# Patient Record
Sex: FEMALE | Race: WHITE | NOT HISPANIC OR LATINO | Employment: STUDENT | URBAN - METROPOLITAN AREA
[De-identification: names, ages, dates, MRNs, and addresses within clinical notes are randomized per-mention and may not be internally consistent; named-entity substitution may affect disease eponyms.]

---
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
## 2020-01-07 DIAGNOSIS — Z30.011 ENCOUNTER FOR INITIAL PRESCRIPTION OF CONTRACEPTIVE PILLS: ICD-10-CM

## 2020-01-07 RX ORDER — ACETAMINOPHEN AND CODEINE PHOSPHATE 120; 12 MG/5ML; MG/5ML
1 SOLUTION ORAL DAILY
Qty: 84 TABLET | Refills: 1 | Status: SHIPPED | OUTPATIENT
Start: 2020-01-07 | End: 2020-03-10 | Stop reason: SDUPTHER

## 2020-01-07 NOTE — TELEPHONE ENCOUNTER
Patient cancelled her yearly for tomorrow due to menses  Patient is a student at [employer] and will come home again on 3/10/20 for her yearly exam   Requests BCP Rx to last until then  She initially thought she wouldn't be back until May but she arranged to be back in March  Sienna sent

## 2020-02-08 ENCOUNTER — OFFICE VISIT (OUTPATIENT)
Dept: URGENT CARE | Facility: CLINIC | Age: 20
End: 2020-02-08
Payer: COMMERCIAL

## 2020-02-08 VITALS
HEART RATE: 93 BPM | DIASTOLIC BLOOD PRESSURE: 64 MMHG | RESPIRATION RATE: 18 BRPM | SYSTOLIC BLOOD PRESSURE: 120 MMHG | OXYGEN SATURATION: 99 % | TEMPERATURE: 98.1 F

## 2020-02-08 DIAGNOSIS — J02.9 SORE THROAT: Primary | ICD-10-CM

## 2020-02-08 LAB — S PYO AG THROAT QL: NEGATIVE

## 2020-02-08 PROCEDURE — 87880 STREP A ASSAY W/OPTIC: CPT | Performed by: PREVENTIVE MEDICINE

## 2020-02-08 PROCEDURE — 99213 OFFICE O/P EST LOW 20 MIN: CPT | Performed by: PREVENTIVE MEDICINE

## 2020-02-08 NOTE — PROGRESS NOTES
[facility]        NAME: Jones Merchant is a 23 y o  female  : 2000    MRN: [id number]  DATE: 2020  TIME: 12:21 PM    Assessment and Plan   Sore throat [J02 9]  1  Sore throat  POCT rapid strepA    Throat culture         Patient Instructions       Follow up with PCP in 3-5 days  Proceed to  ER if symptoms worsen  Chief Complaint     Chief Complaint   Patient presents with    Sinus Problem     2 weeks, congestion    Sore Throat     feels like cotton in throat         History of Present Illness       Sore throat and head congestion x2 weeks      Review of Systems   Review of Systems   Constitutional: Negative for fever  HENT: Positive for congestion and sore throat  Current Medications       Current Outpatient Medications:     albuterol (PROVENTIL HFA,VENTOLIN HFA) 90 mcg/act inhaler, Inhale 2 puffs, Disp: , Rfl:     desvenlafaxine succinate (PRISTIQ) 50 mg 24 hr tablet, Take 50 mg by mouth daily, Disp: , Rfl:     fluticasone (FLONASE) 50 mcg/act nasal spray, 2 sprays into each nostril daily, Disp: 16 g, Rfl: 0    ibuprofen (MOTRIN) 800 mg tablet, TAKE 1 TABLET (800 MG TOTAL) BY MOUTH 2 (TWO) TIMES A DAY AS NEEDED FOR MILD PAIN (PAIN SCORE 1-3)  , Disp: , Rfl: 5    LORazepam (ATIVAN) 0 5 mg tablet, Take 0 5 mg by mouth daily as needed, Disp: , Rfl: 0    norethindrone (MICRONOR) 0 35 MG tablet, Take 1 tablet (0 35 mg total) by mouth daily, Disp: 84 tablet, Rfl: 1    ondansetron (ZOFRAN) 8 mg tablet, TAKE 1 TABLET (8 MG TOTAL) BY MOUTH 2 (TWO) TIMES A DAY AS NEEDED FOR NAUSEA OR VOMITING , Disp: , Rfl: 5    orphenadrine (NORFLEX) 100 mg tablet, TAKE 1 TABLET BY MOUTH TWICE A DAY, Disp: 60 tablet, Rfl: 0    pantoprazole (PROTONIX) 40 mg tablet, Take 1 tablet (40 mg total) by mouth daily, Disp: 90 tablet, Rfl: 3    QUEtiapine (SEROquel) 25 mg tablet, Take 12 5 mg by mouth daily, Disp: , Rfl:     sucralfate (CARAFATE) 1 g tablet, Take 1 tablet (1 g total) by mouth 2 (two) times a day, Disp: 180 tablet, Rfl: 3    pantoprazole (PROTONIX) 40 mg tablet, TAKE 1 TABLET BY MOUTH EVERY DAY, Disp: 90 tablet, Rfl: 1    Current Allergies     Allergies as of 02/08/2020 - Reviewed 02/08/2020   Allergen Reaction Noted    Strawberry c [ascorbate] Anaphylaxis 01/22/2019            The following portions of the patient's history were reviewed and updated as appropriate: allergies, current medications, past family history, past medical history, past social history, past surgical history and problem list      Past Medical History:   Diagnosis Date    Anemia     Anxiety     Asthma     Depression     Endometriosis     Frequent sinus infections     GERD (gastroesophageal reflux disease)     Miscarriage     Ovarian cyst     Syncope        Past Surgical History:   Procedure Laterality Date    TONSILLECTOMY  2006    UPPER GASTROINTESTINAL ENDOSCOPY  05/18/2017    Dysphagia, dilated to [address]   UPPER GASTROINTESTINAL ENDOSCOPY  10/07/2016    Dysphagia, dilated to [address]  Biopsies negative for eosinophilic esophagitis    UPPER GASTROINTESTINAL ENDOSCOPY  02/22/2018    Dysphagia, dilated to [address]    Biopsies negative for eosinophilic esophagitis    WISDOM TOOTH EXTRACTION         Family History   Problem Relation Age of Onset    Hypertension Mother    Seth Hoops Arthritis Mother     Thyroid disease Mother     Colon polyps Mother     Hyperlipidemia Father     Hypertension Maternal Grandfather     Stroke Maternal Grandfather     Myasthenia gravis Maternal Grandfather     Diabetes Paternal Grandfather     Stroke Paternal Grandfather     Cancer Paternal Grandfather     Bipolar disorder Sister     Suicide Attempts Sister     Mental illness Other     Colon cancer Other     Thyroid disease Maternal Grandmother     Colon polyps Maternal Grandmother     Bipolar disorder Paternal Aunt     Drug abuse Paternal Aunt     Eating disorder Paternal Aunt     Substance Abuse Neg Hx          Medications have been verified  Objective   /64   Pulse 93   Temp 98 1 °F (36 7 °C) (Tympanic)   Resp 18   SpO2 99%        Physical Exam     Physical Exam   HENT:   Right Ear: External ear normal    Left Ear: External ear normal    Mild posterior pharyngeal reddening   Cardiovascular: Normal heart sounds  Pulmonary/Chest: Breath sounds normal    Lymphadenopathy:     She has no cervical adenopathy       Rapid strep screen negative at 5 minutes

## 2020-02-10 LAB — B-HEM STREP SPEC QL CULT: NEGATIVE

## 2020-03-10 ENCOUNTER — APPOINTMENT (OUTPATIENT)
Dept: RADIOLOGY | Facility: CLINIC | Age: 20
End: 2020-03-10
Payer: COMMERCIAL

## 2020-03-10 ENCOUNTER — OFFICE VISIT (OUTPATIENT)
Dept: FAMILY MEDICINE CLINIC | Facility: CLINIC | Age: 20
End: 2020-03-10
Payer: COMMERCIAL

## 2020-03-10 ENCOUNTER — ANNUAL EXAM (OUTPATIENT)
Dept: OBGYN CLINIC | Facility: CLINIC | Age: 20
End: 2020-03-10
Payer: COMMERCIAL

## 2020-03-10 VITALS
DIASTOLIC BLOOD PRESSURE: 76 MMHG | SYSTOLIC BLOOD PRESSURE: 120 MMHG | WEIGHT: 190 LBS | BODY MASS INDEX: 30.53 KG/M2 | HEIGHT: 66 IN

## 2020-03-10 VITALS
DIASTOLIC BLOOD PRESSURE: 76 MMHG | WEIGHT: 206 LBS | HEART RATE: 80 BPM | HEIGHT: 66 IN | RESPIRATION RATE: 15 BRPM | BODY MASS INDEX: 33.11 KG/M2 | SYSTOLIC BLOOD PRESSURE: 118 MMHG

## 2020-03-10 DIAGNOSIS — J45.909 ASTHMA, UNSPECIFIED ASTHMA SEVERITY, UNSPECIFIED WHETHER COMPLICATED, UNSPECIFIED WHETHER PERSISTENT: Primary | ICD-10-CM

## 2020-03-10 DIAGNOSIS — A64 STD (FEMALE): ICD-10-CM

## 2020-03-10 DIAGNOSIS — R05.3 CHRONIC COUGH: ICD-10-CM

## 2020-03-10 DIAGNOSIS — N92.6 IRREGULAR PERIODS: Primary | ICD-10-CM

## 2020-03-10 DIAGNOSIS — Z11.3 SCREENING FOR STD (SEXUALLY TRANSMITTED DISEASE): ICD-10-CM

## 2020-03-10 DIAGNOSIS — Z30.011 ENCOUNTER FOR INITIAL PRESCRIPTION OF CONTRACEPTIVE PILLS: ICD-10-CM

## 2020-03-10 DIAGNOSIS — Z01.419 ENCOUNTER FOR ANNUAL ROUTINE GYNECOLOGICAL EXAMINATION: ICD-10-CM

## 2020-03-10 LAB — SL AMB POCT URINE HCG: NORMAL

## 2020-03-10 PROCEDURE — 87491 CHLMYD TRACH DNA AMP PROBE: CPT | Performed by: OBSTETRICS & GYNECOLOGY

## 2020-03-10 PROCEDURE — 3008F BODY MASS INDEX DOCD: CPT | Performed by: OBSTETRICS & GYNECOLOGY

## 2020-03-10 PROCEDURE — 3008F BODY MASS INDEX DOCD: CPT | Performed by: NURSE PRACTITIONER

## 2020-03-10 PROCEDURE — 1036F TOBACCO NON-USER: CPT | Performed by: NURSE PRACTITIONER

## 2020-03-10 PROCEDURE — 71046 X-RAY EXAM CHEST 2 VIEWS: CPT

## 2020-03-10 PROCEDURE — 87591 N.GONORRHOEAE DNA AMP PROB: CPT | Performed by: OBSTETRICS & GYNECOLOGY

## 2020-03-10 PROCEDURE — 81025 URINE PREGNANCY TEST: CPT | Performed by: OBSTETRICS & GYNECOLOGY

## 2020-03-10 PROCEDURE — 99213 OFFICE O/P EST LOW 20 MIN: CPT | Performed by: NURSE PRACTITIONER

## 2020-03-10 PROCEDURE — 99395 PREV VISIT EST AGE 18-39: CPT | Performed by: OBSTETRICS & GYNECOLOGY

## 2020-03-10 PROCEDURE — 70220 X-RAY EXAM OF SINUSES: CPT

## 2020-03-10 RX ORDER — MONTELUKAST SODIUM 10 MG/1
10 TABLET ORAL
Qty: 90 TABLET | Refills: 3 | Status: SHIPPED | OUTPATIENT
Start: 2020-03-10 | End: 2020-07-14 | Stop reason: ALTCHOICE

## 2020-03-10 RX ORDER — ACETAMINOPHEN AND CODEINE PHOSPHATE 120; 12 MG/5ML; MG/5ML
1 SOLUTION ORAL DAILY
Qty: 84 TABLET | Refills: 4 | Status: SHIPPED | OUTPATIENT
Start: 2020-03-10 | End: 2020-07-14 | Stop reason: ALTCHOICE
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
## 2020-03-10 NOTE — PROGRESS NOTES
Assessment/Plan:      1  Chronic cough  We will check both sinus and chest xray  We will call you with the results  In the meantime start the singular and use the rescue mdi as needed  We will matt this in 4 weeks  But call if this is not getting better  - XR chest pa & lateral; Future  - XR sinuses routine 3+ views; Future  - montelukast (SINGULAIR) 10 mg tablet; Take 1 tablet (10 mg total) by mouth daily at bedtime  Dispense: 90 tablet; Refill: 3    BMI Counseling: Body mass index is 33 76 kg/m²  The BMI is above normal  Nutrition recommendations include reducing portion sizes, decreasing overall calorie intake and 3-5 servings of fruits/vegetables daily  Exercise recommendations include vigorous aerobic physical activity for 75 minutes/week  Subjective:      Patient ID: Mary Jo Marr is a 23 y o  female  Here today with complaint of a cough that started in January   Has been using her rescue MDI at least once a day and occasionally using her neb   This does seem to help  Feels tight and wheezy in her lungs  Was seen at [facility] in Feb 15 and dx with sinus infection and was treated with Augmentin for 7 days   Did get some relief with this but feels like her " asthma": got worse after as time has gone on  OBJECTIVE  Past Medical History:   Diagnosis Date    Anemia     Anxiety     Asthma     Depression     Endometriosis     Frequent sinus infections     GERD (gastroesophageal reflux disease)     Miscarriage     Ovarian cyst     Syncope      temporarily  Wt Readings from Last 3 Encounters:   03/10/20 93 4 kg (206 lb) (98 %, Z= 2 03)*   08/08/19 84 8 kg (187 lb) (96 %, Z= 1 75)*   08/01/19 86 6 kg (191 lb) (97 %, Z= 1 82)*     * Growth percentiles are based on CDC (Girls, 2-20 Years) data       BP Readings from Last 3 Encounters:   03/10/20 118/76   02/08/20 120/64   08/08/19 130/88     Pulse Readings from Last 3 Encounters:   03/10/20 80   02/08/20 93   08/08/19 94 BMI Readings from Last 3 Encounters:   03/10/20 33 76 kg/m² (97 %, Z= 1 82)*   08/08/19 30 41 kg/m² (94 %, Z= 1 59)*   08/01/19 31 06 kg/m² (95 %, Z= 1 65)*     * Growth percentiles are based on [facility] (Girls, 2-20 Years) data  Physical Exam   Constitutional: She appears well-developed and well-nourished  HENT:   Tm's clear   turbs pale and boggy with serous discharge   pharynx bengin   Neck: Normal range of motion  Neck supple  Cardiovascular: Normal rate and regular rhythm  Pulmonary/Chest: Effort normal and breath sounds normal    pk fl repeated with good technique is 360   Skin: Skin is warm and dry  Psychiatric: She has a normal mood and affect   Her behavior is normal  Judgment and thought content normal

## 2020-03-11 ENCOUNTER — TELEPHONE (OUTPATIENT)
Dept: FAMILY MEDICINE CLINIC | Facility: CLINIC | Age: 20
End: 2020-03-11

## 2020-03-11 NOTE — TELEPHONE ENCOUNTER
NATANAELOM     ----- Message from Art Mercado Dr sent at 3/10/2020  5:08 PM EDT -----  Please call and let her know that her xrays are normal

## 2020-03-12 LAB
C TRACH DNA SPEC QL NAA+PROBE: NEGATIVE
N GONORRHOEA DNA SPEC QL NAA+PROBE: NEGATIVE

## 2020-03-14 LAB
HAV IGM SERPL QL IA: NEGATIVE
HBV CORE IGM SERPL QL IA: NEGATIVE
HBV SURFACE AG SERPL QL IA: NEGATIVE
HCV AB S/CO SERPL IA: <0.1 S/CO RATIO (ref 0–0.9)
HIV 1+2 AB+HIV1 P24 AG SERPL QL IA: NON REACTIVE
RPR SER QL: NON REACTIVE

## 2020-03-18 ENCOUNTER — TELEPHONE (OUTPATIENT)
Dept: FAMILY MEDICINE CLINIC | Facility: CLINIC | Age: 20
End: 2020-03-18

## 2020-03-18 NOTE — TELEPHONE ENCOUNTER
Called pt in regards to her appointment tomorrow with Celena Aragon  I informed that she should be seen by the ER due to her symptoms she is having after a head injury  Pt  Made this appointment on 3/17/20 at 3:57 through [address]         Per pt her symptoms are :    possible head injury   headache  dizziness  constant confusion  loss of vision   double vision  vomitting    LMOM , This  is an [employer]

## 2020-03-19 ENCOUNTER — APPOINTMENT (EMERGENCY)
Dept: RADIOLOGY | Facility: HOSPITAL | Age: 20
End: 2020-03-19
Payer: COMMERCIAL

## 2020-03-19 ENCOUNTER — HOSPITAL ENCOUNTER (EMERGENCY)
Facility: HOSPITAL | Age: 20
Discharge: HOME | End: 2020-03-19
Attending: EMERGENCY MEDICINE | Admitting: EMERGENCY MEDICINE
Payer: COMMERCIAL

## 2020-03-19 VITALS
HEIGHT: 65 IN | SYSTOLIC BLOOD PRESSURE: 132 MMHG | RESPIRATION RATE: 19 BRPM | HEART RATE: 105 BPM | WEIGHT: 190 LBS | TEMPERATURE: 97.9 F | DIASTOLIC BLOOD PRESSURE: 69 MMHG | OXYGEN SATURATION: 100 % | BODY MASS INDEX: 31.65 KG/M2

## 2020-03-19 DIAGNOSIS — D64.9 ANEMIA: ICD-10-CM

## 2020-03-19 DIAGNOSIS — B34.9 VIRAL SYNDROME: Primary | ICD-10-CM

## 2020-03-19 LAB
ALBUMIN SERPL BCP-MCNC: 3.6 G/DL (ref 3.5–5)
ALP SERPL-CCNC: 92 U/L (ref 46–384)
ALT SERPL W P-5'-P-CCNC: 20 U/L (ref 12–78)
ANION GAP SERPL CALCULATED.3IONS-SCNC: 5 MMOL/L (ref 4–13)
AST SERPL W P-5'-P-CCNC: 13 U/L (ref 5–45)
BASOPHILS # BLD AUTO: 0.01 THOUSANDS/ΜL (ref 0–0.1)
BASOPHILS NFR BLD AUTO: 0 % (ref 0–1)
BILIRUB SERPL-MCNC: 0.3 MG/DL (ref 0.2–1)
BUN SERPL-MCNC: 9 MG/DL (ref 5–25)
CALCIUM SERPL-MCNC: 8.8 MG/DL (ref 8.3–10.1)
CHLORIDE SERPL-SCNC: 102 MMOL/L (ref 100–108)
CLARITY, POC: CLEAR
CO2 SERPL-SCNC: 30 MMOL/L (ref 21–32)
COLOR, POC: YELLOW
CREAT SERPL-MCNC: 0.68 MG/DL (ref 0.6–1.3)
EOSINOPHIL # BLD AUTO: 0.13 THOUSAND/ΜL (ref 0–0.61)
EOSINOPHIL NFR BLD AUTO: 2 % (ref 0–6)
ERYTHROCYTE [DISTWIDTH] IN BLOOD BY AUTOMATED COUNT: 16.4 % (ref 11.6–15.1)
EXT BILIRUBIN, UA: NEGATIVE
EXT BLOOD URINE: NEGATIVE
EXT GLUCOSE, UA: NEGATIVE
EXT KETONES: NEGATIVE
EXT NITRITE, UA: NEGATIVE
EXT PH, UA: 8.5
EXT PREG TEST URINE: NEGATIVE
EXT PROTEIN, UA: NEGATIVE
EXT SPECIFIC GRAVITY, UA: 1
EXT UROBILINOGEN: 0.2
EXT. CONTROL ED NAV: NORMAL
FLUAV RNA NPH QL NAA+PROBE: NORMAL
FLUBV RNA NPH QL NAA+PROBE: NORMAL
GFR SERPL CREATININE-BSD FRML MDRD: 127 ML/MIN/1.73SQ M
GLUCOSE SERPL-MCNC: 86 MG/DL (ref 65–140)
HCT VFR BLD AUTO: 34.3 % (ref 34.8–46.1)
HGB BLD-MCNC: 10.2 G/DL (ref 11.5–15.4)
IMM GRANULOCYTES # BLD AUTO: 0.04 THOUSAND/UL (ref 0–0.2)
IMM GRANULOCYTES NFR BLD AUTO: 1 % (ref 0–2)
LYMPHOCYTES # BLD AUTO: 1.51 THOUSANDS/ΜL (ref 0.6–4.47)
LYMPHOCYTES NFR BLD AUTO: 22 % (ref 14–44)
MCH RBC QN AUTO: 22 PG (ref 26.8–34.3)
MCHC RBC AUTO-ENTMCNC: 29.7 G/DL (ref 31.4–37.4)
MCV RBC AUTO: 74 FL (ref 82–98)
MONOCYTES # BLD AUTO: 0.46 THOUSAND/ΜL (ref 0.17–1.22)
MONOCYTES NFR BLD AUTO: 7 % (ref 4–12)
NEUTROPHILS # BLD AUTO: 4.67 THOUSANDS/ΜL (ref 1.85–7.62)
NEUTS SEG NFR BLD AUTO: 68 % (ref 43–75)
NRBC BLD AUTO-RTO: 0 /100 WBCS
PLATELET # BLD AUTO: 280 THOUSANDS/UL (ref 149–390)
PMV BLD AUTO: 9.8 FL (ref 8.9–12.7)
POTASSIUM SERPL-SCNC: 4.1 MMOL/L (ref 3.5–5.3)
PROT SERPL-MCNC: 7.3 G/DL (ref 6.4–8.2)
RBC # BLD AUTO: 4.63 MILLION/UL (ref 3.81–5.12)
RSV RNA NPH QL NAA+PROBE: NORMAL
SODIUM SERPL-SCNC: 137 MMOL/L (ref 136–145)
WBC # BLD AUTO: 6.82 THOUSAND/UL (ref 4.31–10.16)
WBC # BLD EST: NEGATIVE 10*3/UL

## 2020-03-19 PROCEDURE — 81025 URINE PREGNANCY TEST: CPT | Performed by: EMERGENCY MEDICINE

## 2020-03-19 PROCEDURE — 80053 COMPREHEN METABOLIC PANEL: CPT | Performed by: EMERGENCY MEDICINE

## 2020-03-19 PROCEDURE — 87635 SARS-COV-2 COVID-19 AMP PRB: CPT | Performed by: EMERGENCY MEDICINE

## 2020-03-19 PROCEDURE — 96361 HYDRATE IV INFUSION ADD-ON: CPT

## 2020-03-19 PROCEDURE — 85025 COMPLETE CBC W/AUTO DIFF WBC: CPT | Performed by: EMERGENCY MEDICINE

## 2020-03-19 PROCEDURE — 99284 EMERGENCY DEPT VISIT MOD MDM: CPT | Performed by: EMERGENCY MEDICINE

## 2020-03-19 PROCEDURE — 36415 COLL VENOUS BLD VENIPUNCTURE: CPT | Performed by: EMERGENCY MEDICINE

## 2020-03-19 PROCEDURE — 71045 X-RAY EXAM CHEST 1 VIEW: CPT

## 2020-03-19 PROCEDURE — 99284 EMERGENCY DEPT VISIT MOD MDM: CPT

## 2020-03-19 PROCEDURE — 87631 RESP VIRUS 3-5 TARGETS: CPT | Performed by: EMERGENCY MEDICINE

## 2020-03-19 PROCEDURE — 96374 THER/PROPH/DIAG INJ IV PUSH: CPT

## 2020-03-19 RX ORDER — ONDANSETRON 2 MG/ML
4 INJECTION INTRAMUSCULAR; INTRAVENOUS ONCE
Status: COMPLETED | OUTPATIENT
Start: 2020-03-19 | End: 2020-03-19

## 2020-03-19 RX ADMIN — SODIUM CHLORIDE 1000 ML: 0.9 INJECTION, SOLUTION INTRAVENOUS at 11:39

## 2020-03-19 RX ADMIN — ONDANSETRON 4 MG: 2 INJECTION INTRAMUSCULAR; INTRAVENOUS at 11:38

## 2020-03-19 NOTE — DISCHARGE INSTRUCTIONS
Your healthcare provider and public health staff will evaluate whether you can be cared for at home  If it is determined that you do not need to be hospitalized and can be isolated at home, you will be monitored by staff from your local or state health department  You should follow the prevention steps below until a healthcare provider or local or state health department says you can return to your normal activities  Stay home except to get medical care    People who are mildly ill with COVID-19 are able to isolate at home during their illness  You should restrict activities outside your home, except for getting medical care  Do not go to work, school, or public areas  Avoid using public transportation, ride-sharing, or taxis  Separate yourself from other people and animals in your home    People: As much as possible, you should stay in a specific room and away from other people in your home  Also, you should use a separate bathroom, if available  Animals: You should restrict contact with pets and other animals while you are sick with COVID-19, just like you would around other people  Although there have not been reports of pets or other animals becoming sick with COVID-19, it is still recommended that people sick with COVID-19 limit contact with animals until more information is known about the virus  When possible, have another member of your household care for your animals while you are sick  If you are sick with COVID-19, avoid contact with your pet, including petting, snuggling, being kissed or licked, and sharing food  If you must care for your pet or be around animals while you are sick, wash your hands before and after you interact with pets and wear a facemask  See COVID-19 and Animals for more information  Call ahead before visiting your doctor    If you have a medical appointment, call the healthcare provider and tell them that you have or may have COVID-19   This will help the healthcare providers office take steps to keep other people from getting infected or exposed  Wear a facemask    You should wear a facemask when you are around other people (e g , sharing a room or vehicle) or pets and before you enter a healthcare providers office  If you are not able to wear a facemask (for example, because it causes trouble breathing), then people who live with you should not stay in the same room with you, or they should wear a facemask if they enter your room  Cover your coughs and sneezes    Cover your mouth and nose with a tissue when you cough or sneeze  Throw used tissues in a lined trash can  Immediately wash your hands with soap and water for at least 20 seconds or, if soap and water are not available, clean your hands with an alcohol-based hand  that contains at least 60% alcohol  Clean your hands often    Wash your hands often with soap and water for at least 20 seconds, especially after blowing your nose, coughing, or sneezing; going to the bathroom; and before eating or preparing food  If soap and water are not readily available, use an alcohol-based hand  with at least 60% alcohol, covering all surfaces of your hands and rubbing them together until they feel dry  Soap and water are the best option if hands are visibly dirty  Avoid touching your eyes, nose, and mouth with unwashed hands  Avoid sharing personal household items    You should not share dishes, drinking glasses, cups, eating utensils, towels, or bedding with other people or pets in your home  After using these items, they should be washed thoroughly with soap and water  Clean all high-touch surfaces everyday    High touch surfaces include counters, tabletops, doorknobs, bathroom fixtures, toilets, phones, keyboards, tablets, and bedside tables  Also, clean any surfaces that may have blood, stool, or body fluids on them  Use a household cleaning spray or wipe, according to the label instructions  Labels contain instructions for safe and effective use of the cleaning product including precautions you should take when applying the product, such as wearing gloves and making sure you have good ventilation during use of the product  Monitor your symptoms    Seek prompt medical attention if your illness is worsening (e g , difficulty breathing)  Before seeking care, call your healthcare provider and tell them that you have, or are being evaluated for, COVID-19  Put on a facemask before you enter the facility  These steps will help the healthcare providers office to keep other people in the office or waiting room from getting infected or exposed  Ask your healthcare provider to call the local or state health department  Persons who are placed under active monitoring or facilitated self-monitoring should follow instructions provided by their local health department or occupational health professionals, as appropriate  If you have a medical emergency and need to call 911, notify the dispatch personnel that you have, or are being evaluated for COVID-19  If possible, put on a facemask before emergency medical services arrive  Discontinuing home isolation    Patients with confirmed COVID-19 should remain under home isolation precautions until the risk of secondary transmission to others is thought to be low  The decision to discontinue home isolation precautions should be made on a case-by-case basis, in consultation with healthcare providers and state and local health departments      Source: [employer]

## 2020-03-24 LAB — SARS-COV-2 RNA SPEC QL NAA+PROBE: NOT DETECTED

## 2020-03-24 NOTE — RESULT ENCOUNTER NOTE
Pt returned call, notified of negative result, advised she may discontinue self quarantine three days after fever resolution

## 2020-03-25 NOTE — ED PROVIDER NOTES
History  Chief Complaint   Patient presents with    Dizziness     Pt states feeling dizzy, fatigued, nausea, abdominal pain, poor appetite, hot and cold chills, cough, shortness of breath, and chest pain  pt sent here by PCP        History provided by:  Patient   used: No    Dizziness   Associated symptoms: shortness of breath    Associated symptoms: no blood in stool, no chest pain, no diarrhea, no headaches, no nausea, no palpitations, no vomiting and no weakness      Patient is a 70-year-old female presenting to emergency department with multiple complaints  Complaining of feeling lightheaded, dizzy, fatigue, nausea, body aches and chills  Has a cough for shortness of breath  Sent to the ER by PCP  No fever  History of asthma  Lungs are clear  No sputum  No chest pain  Abdomen soft nontender  No calf pain or swelling  No PE risk factors  MDM will check electrolytes, chest x-ray, viral so, re-evaluate      Prior to Admission Medications   Prescriptions Last Dose Informant Patient Reported? Taking? LORazepam (ATIVAN) 0 5 mg tablet  Self Yes No   Sig: Take 0 5 mg by mouth daily as needed   QUEtiapine (SEROquel) 25 mg tablet  Self Yes No   Sig: Take 12 5 mg by mouth daily   albuterol (PROVENTIL HFA,VENTOLIN HFA) 90 mcg/act inhaler  Self Yes No   Sig: Inhale 2 puffs   desvenlafaxine succinate (PRISTIQ) 50 mg 24 hr tablet  Self Yes No   Sig: Take 50 mg by mouth daily   fluticasone (FLONASE) 50 mcg/act nasal spray  Self No No   Si sprays into each nostril daily   ibuprofen (MOTRIN) 800 mg tablet  Self Yes No   Sig: TAKE 1 TABLET (800 MG TOTAL) BY MOUTH 2 (TWO) TIMES A DAY AS NEEDED FOR MILD PAIN (PAIN SCORE 1-3)     montelukast (SINGULAIR) 10 mg tablet   No No   Sig: Take 1 tablet (10 mg total) by mouth daily at bedtime   norethindrone (MICRONOR) 0 35 MG tablet   No No   Sig: Take 1 tablet (0 35 mg total) by mouth daily   ondansetron (ZOFRAN) 8 mg tablet  Self Yes No   Sig: TAKE 1 TABLET (8 MG TOTAL) BY MOUTH 2 (TWO) TIMES A DAY AS NEEDED FOR NAUSEA OR VOMITING  orphenadrine (NORFLEX) 100 mg tablet   No No   Sig: TAKE 1 TABLET BY MOUTH TWICE A DAY   pantoprazole (PROTONIX) 40 mg tablet   No No   Sig: Take 1 tablet (40 mg total) by mouth daily   sucralfate (CARAFATE) 1 g tablet   No No   Sig: Take 1 tablet (1 g total) by mouth 2 (two) times a day      Facility-Administered Medications: None       Past Medical History:   Diagnosis Date    Anemia     Anxiety     Asthma     Depression     Endometriosis     Frequent sinus infections     GERD (gastroesophageal reflux disease)     Miscarriage     Ovarian cyst     Syncope        Past Surgical History:   Procedure Laterality Date    TONSILLECTOMY  2006    UPPER GASTROINTESTINAL ENDOSCOPY  05/18/2017    [facility]   UPPER GASTROINTESTINAL ENDOSCOPY  10/07/2016    D[facility]agia[facility]  Biopsies negative for eosinophilic esophagitis    UPPER GASTROINTESTINAL ENDOSCOPY  02/22/2018    [facility]ia[facility]  Biopsies negative for eosinophilic esophagitis    WISDOM TOOTH EXTRACTION         Family History   Problem Relation Age of Onset    Hypertension Mother    Belia Huynh Arthritis Mother     Thyroid disease Mother     Colon polyps Mother     Hyperlipidemia Father     Hypertension Maternal Grandfather     Stroke Maternal Grandfather     Myasthenia gravis Maternal Grandfather     Diabetes Paternal Grandfather     Stroke Paternal Grandfather     Cancer Paternal Grandfather     Bipolar disorder Sister     Suicide Attempts Sister     Mental illness Other     Colon cancer Other     Thyroid disease Maternal Grandmother     Colon polyps Maternal Grandmother     Bipolar disorder Paternal Aunt     Drug abuse Paternal Aunt     Eating disorder Paternal Aunt     Substance Abuse Neg Hx      I have reviewed and agree with the history as documented      E-Cigarette/Vaping     E-Cigarette/Vaping Substances     Social History     Tobacco Use    Smoking status: Never Smoker    Smokeless tobacco: Never Used   Substance Use Topics    Alcohol use: Yes     Comment: social    Drug use: Not Currently     Types: Marijuana     Comment: last time a year ago        Review of Systems   Constitutional: Positive for chills  Negative for diaphoresis and fever  HENT: Negative for congestion and sore throat  Respiratory: Positive for cough and shortness of breath  Negative for wheezing and stridor  Cardiovascular: Negative for chest pain, palpitations and leg swelling  Gastrointestinal: Negative for abdominal pain, blood in stool, diarrhea, nausea and vomiting  Genitourinary: Negative for dysuria, frequency and urgency  Musculoskeletal: Positive for myalgias  Negative for neck pain and neck stiffness  Skin: Negative for pallor and rash  Neurological: Negative for dizziness, syncope, weakness, light-headedness and headaches  All other systems reviewed and are negative  Physical Exam  Physical Exam   Constitutional: She is oriented to person, place, and time  She appears well-developed and well-nourished  HENT:   Head: Normocephalic and atraumatic  Eyes: Pupils are equal, round, and reactive to light  Neck: Neck supple  Cardiovascular: Normal rate, regular rhythm, normal heart sounds and intact distal pulses  Pulmonary/Chest: Effort normal and breath sounds normal  No respiratory distress  Abdominal: Soft  Bowel sounds are normal  There is no tenderness  Musculoskeletal: Normal range of motion  She exhibits no tenderness or deformity  Neurological: She is alert and oriented to person, place, and time  Skin: Skin is warm and dry  Capillary refill takes less than 2 seconds  No rash noted  No erythema  No pallor  Vitals reviewed        Vital Signs  ED Triage Vitals   Temperature Pulse Respirations Blood Pressure SpO2   03/19/20 1108 03/19/20 1107 03/19/20 1107 03/19/20 1107 03/19/20 1107   97 9 °F (36 6 °C) 105 19 132/69 100 %      Temp src Heart Rate Source Patient Position - Orthostatic VS BP Location FiO2 (%)   -- 03/19/20 1107 03/19/20 1107 03/19/20 1107 --    Monitor Lying Right arm       Pain Score       --                  Vitals:    03/19/20 1107   BP: 132/69   Pulse: 105   Patient Position - Orthostatic VS: Lying         Visual Acuity      ED Medications  Medications   sodium chloride 0 9 % bolus 1,000 mL (0 mL Intravenous Stopped 3/19/20 1315)   ondansetron (ZOFRAN) injection 4 mg (4 mg Intravenous Given 3/19/20 1138)       Diagnostic Studies  Results Reviewed     Procedure Component Value Units Date/Time    2019 Novel Coronavirus (FVZAS-88), SHIRA [[id number]] Collected:  03/19/20 1339    Lab Status:  Final result Specimen:  Nasopharyngeal Swab Updated:  03/24/20 0605     SARS-CoV-2  Not Detected    Narrative:       Performed at:  [address] [place] [address]  [id number]  : Mauricio Christine MD, Phone:  [phone]    POCT urinalysis dipstick [[id number]]  (Normal) Resulted:  03/19/20 1237    Lab Status:  Final result Specimen:  Urine Updated:  03/19/20 1238     Color, UA yellow     Clarity, UA clear     Glucose, UA (Ref: Negative) negative     Bilirubin, UA (Ref: Negative) negative     Ketones, UA (Ref: Negative) negative     Spec Grav, UA (Ref:1 003-1 030) 1 005     Blood, UA (Ref: Negative) negative     pH, UA (Ref: 4 5-8 0) 8 5     Protein, UA (Ref: Negative) negative     Urobilinogen, UA (Ref: 0 2- 1 0) 0 2      Leukocytes, UA (Ref: Negative) negative     Nitrite, UA (Ref: Negative) negative    POCT pregnancy, urine [[id number]]  (Normal) Resulted:  03/19/20 1237    Lab Status:  Final result Updated:  03/19/20 1237     EXT PREG TEST UR (Ref: Negative) negative     Control valid    Influenza A/B and RSV PCR [[id number]]  (Normal) Collected:  03/19/20 1138    Lab Status:  Final result Specimen:  Nasopharyngeal Swab Updated:  03/19/20 1224     INFLUENZA A PCR None Detected     INFLUENZA B PCR None Detected     RSV PCR None Detected    Comprehensive metabolic panel [[id number]] Collected:  03/19/20 1138    Lab Status:  Final result Specimen:  Blood from Arm, Left Updated:  03/19/20 1204     Sodium 137 mmol/L      Potassium 4 1 mmol/L      Chloride 102 mmol/L      CO2 30 mmol/L      ANION GAP 5 mmol/L      BUN 9 mg/dL      Creatinine 0 68 mg/dL      Glucose 86 mg/dL      Calcium 8 8 mg/dL      AST 13 U/L      ALT 20 U/L      Alkaline Phosphatase 92 U/L      Total Protein 7 3 g/dL      Albumin 3 6 g/dL      Total Bilirubin 0 30 mg/dL      eGFR 127 ml/min/1 73sq m     Narrative:       National Kidney Disease Foundation guidelines for Chronic Kidney Disease (CKD):     Stage 1 with normal or high GFR (GFR > 90 mL/min/1 73 square meters)    Stage 2 Mild CKD (GFR = 60-89 mL/min/1 73 square meters)    Stage 3A Moderate CKD (GFR = 45-59 mL/min/1 73 square meters)    Stage 3B Moderate CKD (GFR = 30-44 mL/min/1 73 square meters)    Stage 4 Severe CKD (GFR = 15-29 mL/min/1 73 square meters)    Stage 5 End Stage CKD (GFR <15 mL/min/1 73 square meters)  Note: GFR calculation is accurate only with a steady state creatinine    CBC and differential [[id number]]  (Abnormal) Collected:  03/19/20 1138    Lab Status:  Final result Specimen:  Blood from Arm, Left Updated:  03/19/20 1148     WBC 6 82 Thousand/uL      RBC 4 63 Million/uL      Hemoglobin 10 2 g/dL      Hematocrit 34 3 %      MCV 74 fL      MCH 22 0 pg      MCHC 29 7 g/dL      RDW 16 4 %      MPV 9 8 fL      Platelets 985 Thousands/uL      nRBC 0 /100 WBCs      Neutrophils Relative 68 %      Immat GRANS % 1 %      Lymphocytes Relative 22 %      Monocytes Relative 7 %      Eosinophils Relative 2 %      Basophils Relative 0 %      Neutrophils Absolute 4 67 Thousands/µL      Immature Grans Absolute 0 04 Thousand/uL      Lymphocytes Absolute 1 51 Thousands/µL      Monocytes Absolute 0 46 Thousand/µL      Eosinophils Absolute 0 13 Thousand/µL      Basophils Absolute 0 01 Thousands/µL                  XR chest 1 view portable   Final Result by Anirudh Veliz MD (03/19 1307)   No acute cardiopulmonary disease  Stable appearance      Workstation performed: [id number]                    Procedures  Procedures         ED Course                                 MDM      Disposition  Final diagnoses:   Viral syndrome   Anemia     Time reflects when diagnosis was documented in both MDM as applicable and the Disposition within this note     Time User Action Codes Description Comment    3/19/2020  1:45 PM Sophie Beatty Add [B34 9] Viral syndrome     3/19/2020  1:46 PM Torri Graves Sophie Add [D64 9] Anemia       ED Disposition     ED Disposition Condition Date/Time Comment    Discharge Stable Thu Mar 19, 2020  1:45 PM Mary Jo Marr discharge to home/self care              Follow-up Information     Follow up With Specialties Details Why Contact Info Additional Information    Ray Thomas, [address], Nurse Practitioner Call in 1 day Please check in with family doctor and discuss your symptoms and also follow-up for anemia Evens Fritz [address] [facility] 2070 Emergency Department Emergency Medicine  As needed, If symptoms worsen [address] [address]  [phone]  ED, [address] [place] [address], [facility] [address]          Discharge Medication List as of 3/19/2020  1:46 PM      CONTINUE these medications which have NOT CHANGED    Details   albuterol (PROVENTIL HFA,VENTOLIN HFA) 90 mcg/act inhaler Inhale 2 puffs, Historical Med      desvenlafaxine succinate (PRISTIQ) 50 mg 24 hr tablet Take 50 mg by mouth daily, Historical Med      fluticasone (FLONASE) 50 mcg/act nasal spray 2 sprays into each nostril daily, Starting Tue 1/22/2019, OTC      ibuprofen (MOTRIN) 800 mg tablet TAKE 1 TABLET (800 MG TOTAL) BY MOUTH 2 (TWO) TIMES A DAY AS NEEDED FOR MILD PAIN (PAIN SCORE 1-3)  , Historical Med      LORazepam (ATIVAN) 0 5 mg tablet Take 0 5 mg by mouth daily as needed, Starting Sat 12/15/2018, Historical Med      montelukast (SINGULAIR) 10 mg tablet Take 1 tablet (10 mg total) by mouth daily at bedtime, Starting Tue 3/10/2020, Normal      norethindrone (MICRONOR) 0 35 MG tablet Take 1 tablet (0 35 mg total) by mouth daily, Starting Tue 3/10/2020, Until Tue 8/25/2020, Normal      ondansetron (ZOFRAN) 8 mg tablet TAKE 1 TABLET (8 MG TOTAL) BY MOUTH 2 (TWO) TIMES A DAY AS NEEDED FOR NAUSEA OR VOMITING , Historical Med      orphenadrine (NORFLEX) 100 mg tablet TAKE 1 TABLET BY MOUTH TWICE A DAY, Normal      pantoprazole (PROTONIX) 40 mg tablet Take 1 tablet (40 mg total) by mouth daily, Starting Thu 8/8/2019, Print      QUEtiapine (SEROquel) 25 mg tablet Take 12 5 mg by mouth daily, Historical Med      sucralfate (CARAFATE) 1 g tablet Take 1 tablet (1 g total) by mouth 2 (two) times a day, Starting Thu 8/8/2019, Print           No discharge procedures on file      PDMP Review     None          ED Provider  Electronically Signed by           Davy Obregon MD  03/25/20 6046

## 2020-05-29 ENCOUNTER — OFFICE VISIT (OUTPATIENT)
Dept: URGENT CARE | Facility: CLINIC | Age: 20
End: 2020-05-29
Payer: COMMERCIAL

## 2020-05-29 VITALS
OXYGEN SATURATION: 97 % | HEIGHT: 65 IN | RESPIRATION RATE: 18 BRPM | BODY MASS INDEX: 34.82 KG/M2 | SYSTOLIC BLOOD PRESSURE: 118 MMHG | TEMPERATURE: 98.6 F | WEIGHT: 209 LBS | HEART RATE: 100 BPM | DIASTOLIC BLOOD PRESSURE: 72 MMHG

## 2020-05-29 DIAGNOSIS — R10.30 LOWER ABDOMINAL PAIN: ICD-10-CM

## 2020-05-29 DIAGNOSIS — R30.0 DYSURIA: Primary | ICD-10-CM

## 2020-05-29 PROCEDURE — 99213 OFFICE O/P EST LOW 20 MIN: CPT | Performed by: PREVENTIVE MEDICINE

## 2020-05-29 RX ORDER — HYDROXYZINE HYDROCHLORIDE 10 MG/1
TABLET, FILM COATED ORAL AS NEEDED
COMMUNITY
Start: 2020-05-28 | End: 2020-07-28 | Stop reason: ALTCHOICE

## 2020-07-11 ENCOUNTER — HOSPITAL ENCOUNTER (EMERGENCY)
Facility: HOSPITAL | Age: 20
Discharge: HOME | End: 2020-07-11
Attending: EMERGENCY MEDICINE | Admitting: EMERGENCY MEDICINE
Payer: COMMERCIAL

## 2020-07-11 ENCOUNTER — NURSE TRIAGE (OUTPATIENT)
Dept: OTHER | Facility: OTHER | Age: 20
End: 2020-07-11

## 2020-07-11 VITALS
TEMPERATURE: 98.8 F | BODY MASS INDEX: 30.53 KG/M2 | HEIGHT: 66 IN | HEART RATE: 90 BPM | DIASTOLIC BLOOD PRESSURE: 80 MMHG | RESPIRATION RATE: 16 BRPM | OXYGEN SATURATION: 100 % | SYSTOLIC BLOOD PRESSURE: 129 MMHG | WEIGHT: 190 LBS

## 2020-07-11 DIAGNOSIS — Z34.91 FIRST TRIMESTER PREGNANCY: ICD-10-CM

## 2020-07-11 DIAGNOSIS — R11.2 NON-INTRACTABLE VOMITING WITH NAUSEA, UNSPECIFIED VOMITING TYPE: Primary | ICD-10-CM

## 2020-07-11 DIAGNOSIS — G43.001 MIGRAINE WITHOUT AURA AND WITH STATUS MIGRAINOSUS, NOT INTRACTABLE: ICD-10-CM

## 2020-07-11 LAB
ALBUMIN SERPL BCP-MCNC: 4.3 G/DL (ref 3.5–5)
ALP SERPL-CCNC: 76 U/L (ref 46–384)
ALT SERPL W P-5'-P-CCNC: 18 U/L (ref 12–78)
ANION GAP SERPL CALCULATED.3IONS-SCNC: 10 MMOL/L (ref 4–13)
AST SERPL W P-5'-P-CCNC: 14 U/L (ref 5–45)
B-HCG SERPL-ACNC: ABNORMAL MIU/ML
BASOPHILS # BLD AUTO: 0.02 THOUSANDS/ΜL (ref 0–0.1)
BASOPHILS NFR BLD AUTO: 0 % (ref 0–1)
BILIRUB SERPL-MCNC: 0.4 MG/DL (ref 0.2–1)
BILIRUB UR QL STRIP: NEGATIVE
BUN SERPL-MCNC: 6 MG/DL (ref 5–25)
CALCIUM SERPL-MCNC: 9.2 MG/DL (ref 8.3–10.1)
CHLORIDE SERPL-SCNC: 99 MMOL/L (ref 100–108)
CLARITY UR: ABNORMAL
CO2 SERPL-SCNC: 25 MMOL/L (ref 21–32)
COLOR UR: YELLOW
CREAT SERPL-MCNC: 0.65 MG/DL (ref 0.6–1.3)
EOSINOPHIL # BLD AUTO: 0.15 THOUSAND/ΜL (ref 0–0.61)
EOSINOPHIL NFR BLD AUTO: 2 % (ref 0–6)
ERYTHROCYTE [DISTWIDTH] IN BLOOD BY AUTOMATED COUNT: 15.6 % (ref 11.6–15.1)
GFR SERPL CREATININE-BSD FRML MDRD: 129 ML/MIN/1.73SQ M
GLUCOSE SERPL-MCNC: 84 MG/DL (ref 65–140)
GLUCOSE UR STRIP-MCNC: NEGATIVE MG/DL
HCT VFR BLD AUTO: 36.9 % (ref 34.8–46.1)
HGB BLD-MCNC: 11.6 G/DL (ref 11.5–15.4)
HGB UR QL STRIP.AUTO: NEGATIVE
IMM GRANULOCYTES # BLD AUTO: 0.02 THOUSAND/UL (ref 0–0.2)
IMM GRANULOCYTES NFR BLD AUTO: 0 % (ref 0–2)
KETONES UR STRIP-MCNC: ABNORMAL MG/DL
LEUKOCYTE ESTERASE UR QL STRIP: NEGATIVE
LYMPHOCYTES # BLD AUTO: 1.3 THOUSANDS/ΜL (ref 0.6–4.47)
LYMPHOCYTES NFR BLD AUTO: 17 % (ref 14–44)
MCH RBC QN AUTO: 24.4 PG (ref 26.8–34.3)
MCHC RBC AUTO-ENTMCNC: 31.4 G/DL (ref 31.4–37.4)
MCV RBC AUTO: 78 FL (ref 82–98)
MONOCYTES # BLD AUTO: 0.61 THOUSAND/ΜL (ref 0.17–1.22)
MONOCYTES NFR BLD AUTO: 8 % (ref 4–12)
NEUTROPHILS # BLD AUTO: 5.43 THOUSANDS/ΜL (ref 1.85–7.62)
NEUTS SEG NFR BLD AUTO: 73 % (ref 43–75)
NITRITE UR QL STRIP: NEGATIVE
NRBC BLD AUTO-RTO: 0 /100 WBCS
PH UR STRIP.AUTO: 6 [PH]
PLATELET # BLD AUTO: 314 THOUSANDS/UL (ref 149–390)
PMV BLD AUTO: 9.7 FL (ref 8.9–12.7)
POTASSIUM SERPL-SCNC: 3.7 MMOL/L (ref 3.5–5.3)
PROT SERPL-MCNC: 7.9 G/DL (ref 6.4–8.2)
PROT UR STRIP-MCNC: NEGATIVE MG/DL
RBC # BLD AUTO: 4.76 MILLION/UL (ref 3.81–5.12)
SODIUM SERPL-SCNC: 134 MMOL/L (ref 136–145)
SP GR UR STRIP.AUTO: 1.01 (ref 1–1.03)
UROBILINOGEN UR QL STRIP.AUTO: 0.2 E.U./DL
WBC # BLD AUTO: 7.53 THOUSAND/UL (ref 4.31–10.16)

## 2020-07-11 PROCEDURE — 84702 CHORIONIC GONADOTROPIN TEST: CPT | Performed by: EMERGENCY MEDICINE

## 2020-07-11 PROCEDURE — 96374 THER/PROPH/DIAG INJ IV PUSH: CPT

## 2020-07-11 PROCEDURE — 81003 URINALYSIS AUTO W/O SCOPE: CPT | Performed by: EMERGENCY MEDICINE

## 2020-07-11 PROCEDURE — 96361 HYDRATE IV INFUSION ADD-ON: CPT

## 2020-07-11 PROCEDURE — 99283 EMERGENCY DEPT VISIT LOW MDM: CPT

## 2020-07-11 PROCEDURE — 96375 TX/PRO/DX INJ NEW DRUG ADDON: CPT

## 2020-07-11 PROCEDURE — 99284 EMERGENCY DEPT VISIT MOD MDM: CPT | Performed by: EMERGENCY MEDICINE

## 2020-07-11 PROCEDURE — 85025 COMPLETE CBC W/AUTO DIFF WBC: CPT | Performed by: EMERGENCY MEDICINE

## 2020-07-11 PROCEDURE — C9113 INJ PANTOPRAZOLE SODIUM, VIA: HCPCS | Performed by: EMERGENCY MEDICINE

## 2020-07-11 PROCEDURE — 36415 COLL VENOUS BLD VENIPUNCTURE: CPT | Performed by: EMERGENCY MEDICINE

## 2020-07-11 PROCEDURE — 80053 COMPREHEN METABOLIC PANEL: CPT | Performed by: EMERGENCY MEDICINE

## 2020-07-11 RX ORDER — ONDANSETRON 4 MG/1
4 TABLET, ORALLY DISINTEGRATING ORAL EVERY 6 HOURS PRN
Qty: 20 TABLET | Refills: 0 | Status: SHIPPED | OUTPATIENT
Start: 2020-07-11 | End: 2020-07-14 | Stop reason: SDUPTHER

## 2020-07-11 RX ORDER — PANTOPRAZOLE SODIUM 40 MG/1
40 INJECTION, POWDER, FOR SOLUTION INTRAVENOUS ONCE
Status: COMPLETED | OUTPATIENT
Start: 2020-07-11 | End: 2020-07-11

## 2020-07-11 RX ORDER — METOCLOPRAMIDE HYDROCHLORIDE 5 MG/ML
10 INJECTION INTRAMUSCULAR; INTRAVENOUS ONCE
Status: COMPLETED | OUTPATIENT
Start: 2020-07-11 | End: 2020-07-11

## 2020-07-11 RX ADMIN — METOCLOPRAMIDE HYDROCHLORIDE 10 MG: 5 INJECTION INTRAMUSCULAR; INTRAVENOUS at 13:35

## 2020-07-11 RX ADMIN — SODIUM CHLORIDE 1000 ML: 0.9 INJECTION, SOLUTION INTRAVENOUS at 15:01

## 2020-07-11 RX ADMIN — PANTOPRAZOLE SODIUM 40 MG: 40 INJECTION, POWDER, FOR SOLUTION INTRAVENOUS at 14:57

## 2020-07-11 RX ADMIN — SODIUM CHLORIDE 1000 ML: 0.9 INJECTION, SOLUTION INTRAVENOUS at 13:29

## 2020-07-11 NOTE — TELEPHONE ENCOUNTER
Reason for Disposition   [1] MODERATE vomiting (e g , 2-7 times / day) AND [2] present > 3 days    Answer Assessment - Initial Assessment Questions  1  VOMITING SEVERITY: "How many times have you vomited  in the past 24 hours?"      - MILD: 1 - 2 times/day     - MODERATE:  3 - 7 times/day     - SEVERE:  8 or more times/day, vomits everything or nearly everything, weight loss       6 times  States has lost 13lbs in 7 day     2  ONSET: "When did the vomiting begin?"       5 days ago     3  FLUIDS: "What fluids or food have you vomited up today?" "Are you able to keep any liquids down?"      Denies in the past 24 hours     4  TREATMENT: "What have you been doing so far to treat this?"       Denies     5  DEHYDRATION: "When was the last time you urinated?" "Are you feeling lightheaded?" "Weight loss?"     13 lbs, and feeling lightheaded    6  PREGNANCY: "How many weeks pregnant are you?" "How has the pregnancy been going?"      6 weeks     7  VAHID: "What date are you expecting to deliver?"       March 4th 2021    8  MEDICATIONS: "What medications are you taking?" (e g , prenatal vitamins, iron)      Prentatal, acid reflux, and anti-depressant     9   OTHER SYMPTOMS: "Do you have any other symptoms?"      Fever 101 orally at 0830  99 temp orally @ 1049    Protocols used: PREGNANCY - MORNING SICKNESS (NAUSEA AND VOMITING OF PREGNANCY)-ADULT-

## 2020-07-11 NOTE — TELEPHONE ENCOUNTER
Regarding: severe Morning sickness loss of 13 lbs fever  not seen ob/gyn yet  ----- Message from Lauren Valenzuela LPN sent at 0/09/5883  9:39 AM EDT -----  Recd call stating " I am 6 weeks pregnant and I have been having severe morning sickness I have had a low grade temp of 99 and have lost 13 lbs in the past week   I have not seen a OB Gyn  Doctor yet "

## 2020-07-11 NOTE — ED PROVIDER NOTES
History  Chief Complaint   Patient presents with    Vomiting     pt reports being 6 weeks pregnant, and not being able to keep fluids down for 28 hours  states she lost 13 pounds in 7 days and has been running a fever of 101  This 22-year-old female with history of anxiety, depression, asthma, GERD and endometriosis states she is 6 weeks pregnant  She has an appointment next week to be seen by OB  She has had some nausea and anorexia for the last 2 weeks  She had intermittent vomiting  Nausea vomiting became worse yesterday  She says that she has not been able to keep any medication, solids or liquids down for the past 28 hours  She has been feeling orthostatic symptoms  She said her temperature was 99° yesterday and 100° today  She denies sore throat nasal congestion, cough, dyspnea, diarrhea, dysuria and skin infection  Patient also denies pelvic cramping and vaginal spotting  She has colicky upper abdominal pain for the past few days  She states she has history of GERD and has been not able to keep her medications down  She denies alcohol tobacco and drug use  She is a  to with history of 1 prior miscarriage  Prior to Admission Medications   Prescriptions Last Dose Informant Patient Reported? Taking?    QUEtiapine (SEROquel) 25 mg tablet  Self Yes No   Sig: Take 12 5 mg by mouth as needed    albuterol (PROVENTIL HFA,VENTOLIN HFA) 90 mcg/act inhaler  Self Yes No   Sig: Inhale 2 puffs   desvenlafaxine succinate (PRISTIQ) 50 mg 24 hr tablet  Self Yes No   Sig: Take 50 mg by mouth daily   fluticasone (FLONASE) 50 mcg/act nasal spray  Self No No   Si sprays into each nostril daily   hydrOXYzine HCL (ATARAX) 10 mg tablet  Self Yes No   Sig: as needed    ondansetron (ZOFRAN) 8 mg tablet  Self Yes No   Sig: TAKE 1 TABLET (8 MG TOTAL) BY MOUTH 2 (TWO) TIMES A DAY AS NEEDED FOR NAUSEA OR VOMITING    pantoprazole (PROTONIX) 40 mg tablet  Self No No   Sig: Take 1 tablet (40 mg total) by mouth daily   sucralfate (CARAFATE) 1 g tablet  Self No No   Sig: Take 1 tablet (1 g total) by mouth 2 (two) times a day      Facility-Administered Medications: None       Past Medical History:   Diagnosis Date    Anemia     Anxiety     Asthma     Depression     Endometriosis     Frequent sinus infections     GERD (gastroesophageal reflux disease)     Miscarriage     Ovarian cyst     Syncope        Past Surgical History:   Procedure Laterality Date    TONSILLECTOMY  2006    UPPER GASTROINTESTINAL ENDOSCOPY  05/18/2017    Dysphagia, dilated to 47 [facility]   UPPER GASTROINTESTINAL ENDOSCOPY  10/07/2016    Dysphagia, dilated to 47 [facility]  Biopsies negative for eosinophilic esophagitis    UPPER GASTROINTESTINAL ENDOSCOPY  02/22/2018    Dysphagia, dilated to 64 [facility]  Biopsies negative for eosinophilic esophagitis    WISDOM TOOTH EXTRACTION         Family History   Problem Relation Age of Onset    Hypertension Mother    [place] Pert Arthritis Mother     Thyroid disease Mother     Colon polyps Mother     Hyperlipidemia Father     Hypertension Maternal Grandfather     Stroke Maternal Grandfather     Myasthenia gravis Maternal Grandfather     Diabetes Paternal Grandfather     Stroke Paternal Grandfather     Cancer Paternal Grandfather     Bipolar disorder Sister     Suicide Attempts Sister     Mental illness Other     Colon cancer Other     Thyroid disease Maternal Grandmother     Colon polyps Maternal Grandmother     Bipolar disorder Paternal Aunt     Drug abuse Paternal Aunt     Eating disorder Paternal [de-identified]     Lung cancer Maternal Uncle     Substance Abuse Neg Hx      I have reviewed and agree with the history as documented      E-Cigarette/Vaping    E-Cigarette Use Never User      E-Cigarette/Vaping Substances     Social History     Tobacco Use    Smoking status: Never Smoker    Smokeless tobacco: Never Used   Substance Use Topics    Alcohol use: Yes     Comment: social    Drug use: Not Currently     Types: Marijuana     Comment: last time a year ago        Review of Systems   Constitutional: Positive for appetite change, diaphoresis, fever and unexpected weight change  Negative for chills and fatigue  HENT: Negative  Eyes: Negative  Respiratory: Negative  Cardiovascular: Negative  Gastrointestinal: Positive for abdominal pain, nausea and vomiting  Negative for blood in stool, constipation and diarrhea  Endocrine: Negative  Genitourinary: Positive for decreased urine volume  Negative for difficulty urinating, dysuria, flank pain and hematuria  Musculoskeletal: Negative  Skin: Negative  Allergic/Immunologic: Negative  Neurological: Negative  Hematological: Negative  Psychiatric/Behavioral: Negative  All other systems reviewed and are negative  Physical Exam  Physical Exam   Constitutional: She is oriented to person, place, and time  She appears well-developed and well-nourished  No distress  HENT:   Head: Normocephalic and atraumatic  Right Ear: External ear normal    Left Ear: External ear normal    Dry oral mucosa   Eyes: Pupils are equal, round, and reactive to light  Conjunctivae and EOM are normal    Neck: Normal range of motion  Neck supple  No JVD present  Cardiovascular: Normal rate, regular rhythm and intact distal pulses  No murmur heard  Pulmonary/Chest: Effort normal and breath sounds normal    Abdominal: Soft  Bowel sounds are normal  She exhibits no distension and no mass  There is no tenderness  There is no rebound and no guarding  Musculoskeletal: Normal range of motion  She exhibits no edema or tenderness  Lymphadenopathy:     She has no cervical adenopathy  Neurological: She is alert and oriented to person, place, and time  She has normal reflexes  She displays normal reflexes  No cranial nerve deficit or sensory deficit  She exhibits normal muscle tone  Coordination normal    Skin: Skin is warm and dry   Capillary refill takes less than 2 seconds  No rash noted  She is not diaphoretic  Psychiatric: She has a normal mood and affect  Nursing note and vitals reviewed        Vital Signs  ED Triage Vitals   Temperature Pulse Respirations Blood Pressure SpO2   07/11/20 1509 07/11/20 1307 07/11/20 1307 07/11/20 1307 07/11/20 1307   98 8 °F (37 1 °C) 90 16 129/80 100 %      Temp Source Heart Rate Source Patient Position - Orthostatic VS BP Location FiO2 (%)   07/11/20 1509 07/11/20 1307 07/11/20 1307 07/11/20 1307 --   Tympanic Monitor Lying Left arm       Pain Score       --                  Vitals:    07/11/20 1307   BP: 129/80   Pulse: 90   Patient Position - Orthostatic VS: Lying         Visual Acuity      ED Medications  Medications   sodium chloride 0 9 % bolus 1,000 mL (0 mL Intravenous Stopped 7/11/20 1456)   metoclopramide (REGLAN) injection 10 mg (10 mg Intravenous Given 7/11/20 1335)   pantoprazole (PROTONIX) injection 40 mg (40 mg Intravenous Given 7/11/20 1457)   sodium chloride 0 9 % bolus 1,000 mL (0 mL Intravenous Stopped 7/11/20 1710)       Diagnostic Studies  Results Reviewed     Procedure Component Value Units Date/Time    UA (URINE) with reflex to Scope [[id number]]  (Abnormal) Collected:  07/11/20 1457    Lab Status:  Final result Specimen:  Urine, Clean Catch Updated:  07/11/20 1509     Color, UA Yellow     Clarity, UA Slightly Cloudy     Specific [place], UA 1 010     pH, UA 6 0     Leukocytes, UA Negative     Nitrite, UA Negative     Protein, UA Negative mg/dl      Glucose, UA Negative mg/dl      Ketones, UA >=80 (3+) mg/dl      Urobilinogen, UA 0 2 E U /dl      Bilirubin, UA Negative     Blood, UA Negative    Quantitative hCG [[id number]]  (Abnormal) Collected:  07/11/20 1335    Lab Status:  Final result Specimen:  Blood from Arm, Right Updated:  07/11/20 1428     HCG, Quant 27,369 mIU/mL     Narrative:        Expected Ranges:     Approximate               Approximate HCG  Gestation age          Concentration ( mIU/mL)  _____________          ______________________   Sabrina Crockett                      HCG values  0 2-1                       5-50  1-2                           2-3                         100-5000  3-4                         500-79041  4-5                         1000-09581  5-6                         [id number]  6-8                         [id number]  8-12                        [id number]      Comprehensive metabolic panel [[id number]]  (Abnormal) Collected:  07/11/20 1335    Lab Status:  Final result Specimen:  Blood from Arm, Right Updated:  07/11/20 1359     Sodium 134 mmol/L      Potassium 3 7 mmol/L      Chloride 99 mmol/L      CO2 25 mmol/L      ANION GAP 10 mmol/L      BUN 6 mg/dL      Creatinine 0 65 mg/dL      Glucose 84 mg/dL      Calcium 9 2 mg/dL      AST 14 U/L      ALT 18 U/L      Alkaline Phosphatase 76 U/L      Total Protein 7 9 g/dL      Albumin 4 3 g/dL      Total Bilirubin 0 40 mg/dL      eGFR 129 ml/min/1 73sq m     Narrative:       [place] guidelines for Chronic Kidney Disease (CKD):     Stage 1 with normal or high GFR (GFR > 90 mL/min/1 73 square meters)    Stage 2 Mild CKD (GFR = 60-89 mL/min/1 73 square meters)    Stage 3A Moderate CKD (GFR = 45-59 mL/min/1 73 square meters)    Stage 3B Moderate CKD (GFR = 30-44 mL/min/1 73 square meters)    Stage 4 Severe CKD (GFR = 15-29 mL/min/1 73 square meters)    Stage 5 End Stage CKD (GFR <15 mL/min/1 73 square meters)  Note: GFR calculation is accurate only with a steady state creatinine    CBC and differential [[id number]]  (Abnormal) Collected:  07/11/20 1335    Lab Status:  Final result Specimen:  Blood from Arm, Right Updated:  07/11/20 1342     WBC 7 53 Thousand/uL      RBC 4 76 Million/uL      Hemoglobin 11 6 g/dL      Hematocrit 36 9 %      MCV 78 fL      MCH 24 4 pg      MCHC 31 4 g/dL      RDW 15 6 %      MPV 9 7 fL      Platelets 195 Thousands/uL      nRBC 0 /100 WBCs      Neutrophils Relative 73 %      Immat GRANS % 0 %      Lymphocytes Relative 17 %      Monocytes Relative 8 %      Eosinophils Relative 2 %      Basophils Relative 0 %      Neutrophils Absolute 5 43 Thousands/µL      Immature Grans Absolute 0 02 Thousand/uL      Lymphocytes Absolute 1 30 Thousands/µL      Monocytes Absolute 0 61 Thousand/µL      Eosinophils Absolute 0 15 Thousand/µL      Basophils Absolute 0 02 Thousands/µL                  No orders to display              Procedures  Procedures         ED Course  ED Course as of Jul 15 1527   Sat Jul 11, 2020   1450 HCG QUANTITATIVE(!): 74,621   1458 Patient is feeling better  Feels little bit dizzy  Will give more fluid and await urine results  [id number] Patient feels much better  She is undergoing oral challenge now with ginger ale and Abhinav crackers  Vital signs remain stable  [id number] Patient states she feels much better  She was able to retain ginger ale, pretzels and crackers              CRAFFT      Most Recent Value   During the past 12 months, did you:   1  Drink any alcohol (more than a few sips)? No Filed at: 07/11/2020 1308   2  Smoke any marijuana or hashish  No Filed at: 07/11/2020 1308   3  Use anything else to get high? ("anything else" includes illegal drugs, over the counter and prescription drugs, and things that you sniff or 'hale')?   No Filed at: 07/11/2020 1308                                        MDM  Number of Diagnoses or Management Options  First trimester pregnancy: new and does not require workup  Migraine without aura and with status migrainosus, not intractable: established and worsening  Non-intractable vomiting with nausea, unspecified vomiting type: new and requires workup     Amount and/or Complexity of Data Reviewed  Clinical lab tests: ordered and reviewed          Disposition  Final diagnoses:   Non-intractable vomiting with nausea, unspecified vomiting type   First trimester pregnancy   Migraine without aura and with status migrainosus, not intractable     Time reflects when diagnosis was documented in both MDM as applicable and the Disposition within this note     Time User Action Codes Description Comment    7/11/2020  5:11 PM Berneta Papa Add [R11 2] Non-intractable vomiting with nausea, unspecified vomiting type     7/11/2020  5:11 PM Berneta Papa Add [Z34 91] First trimester pregnancy     7/11/2020  5:12 PM Berneta Papa Add [G43 001] Migraine without aura and with status migrainosus, not intractable       ED Disposition     ED Disposition Condition Date/Time Comment    Discharge Stable Sat Jul 11, 2020  5:11 PM Kerrilashanda Bernabe discharge to home/self care              Follow-up Information     Follow up With Specialties Details Why [address], [address], Nurse Practitioner Call in 2 days  [place] [facility]  [phone]      Your obstetrician office  Call in 1 day For follow-up           Discharge Medication List as of 7/11/2020  5:15 PM      START taking these medications    Details   ondansetron (ZOFRAN-ODT) 4 mg disintegrating tablet Take 1 tablet (4 mg total) by mouth every 6 (six) hours as needed for nausea or vomiting, Starting Sat 7/11/2020, Normal         CONTINUE these medications which have NOT CHANGED    Details   albuterol (PROVENTIL HFA,VENTOLIN HFA) 90 mcg/act inhaler Inhale 2 puffs, Historical Med      desvenlafaxine succinate (PRISTIQ) 50 mg 24 hr tablet Take 50 mg by mouth daily, Historical Med      fluticasone (FLONASE) 50 mcg/act nasal spray 2 sprays into each nostril daily, Starting Tue 1/22/2019, OTC      hydrOXYzine HCL (ATARAX) 10 mg tablet Starting Thu 5/28/2020, Historical Med      pantoprazole (PROTONIX) 40 mg tablet Take 1 tablet (40 mg total) by mouth daily, Starting Thu 8/8/2019, Print      QUEtiapine (SEROquel) 25 mg tablet Take 12 5 mg by mouth daily, Historical Med      sucralfate (CARAFATE) 1 g tablet Take 1 tablet (1 g total) by mouth 2 (two) times a day, Starting Thu 8/8/2019, Print      ibuprofen (MOTRIN) 800 mg tablet TAKE 1 TABLET (800 MG TOTAL) BY MOUTH 2 (TWO) TIMES A DAY AS NEEDED FOR MILD PAIN (PAIN SCORE 1-3)  , Historical Med      LORazepam (ATIVAN) 0 5 mg tablet Take 0 5 mg by mouth daily as needed, Starting Sat 12/15/2018, Historical Med      montelukast (SINGULAIR) 10 mg tablet Take 1 tablet (10 mg total) by mouth daily at bedtime, Starting Tue 3/10/2020, Normal      norethindrone (MICRONOR) 0 35 MG tablet Take 1 tablet (0 35 mg total) by mouth daily, Starting Tue 3/10/2020, Until Tue 8/25/2020, Normal      orphenadrine (NORFLEX) 100 mg tablet TAKE 1 TABLET BY MOUTH TWICE A DAY, Normal         STOP taking these medications       ondansetron (ZOFRAN) 8 mg tablet Comments:   Reason for Stopping:             No discharge procedures on file      PDMP Review       Value Time User    PDMP Reviewed  Yes 7/11/2020  5:11 PM Ruperto Pond DO          ED Provider  Electronically Signed by           Ruperto Pond DO  07/15/20 2837

## 2020-07-11 NOTE — DISCHARGE INSTRUCTIONS
Rest   Increase fluid intake  Eat bland meals  Take ondansetron if needed for nausea  Speak to your PCP and your Obstetrician regarding your other medications  Return if worse

## 2020-07-13 ENCOUNTER — OFFICE VISIT (OUTPATIENT)
Dept: OBGYN CLINIC | Facility: CLINIC | Age: 20
End: 2020-07-13
Payer: COMMERCIAL

## 2020-07-13 DIAGNOSIS — O21.0 HYPEREMESIS GRAVIDARUM: ICD-10-CM

## 2020-07-13 DIAGNOSIS — N91.2 AMENORRHEA: Primary | ICD-10-CM

## 2020-07-13 PROCEDURE — 1036F TOBACCO NON-USER: CPT | Performed by: OBSTETRICS & GYNECOLOGY

## 2020-07-13 PROCEDURE — 99213 OFFICE O/P EST LOW 20 MIN: CPT | Performed by: OBSTETRICS & GYNECOLOGY

## 2020-07-14 ENCOUNTER — OFFICE VISIT (OUTPATIENT)
Dept: FAMILY MEDICINE CLINIC | Facility: CLINIC | Age: 20
End: 2020-07-14
Payer: COMMERCIAL

## 2020-07-14 VITALS
HEART RATE: 86 BPM | HEIGHT: 66 IN | RESPIRATION RATE: 16 BRPM | TEMPERATURE: 98.3 F | WEIGHT: 195.2 LBS | SYSTOLIC BLOOD PRESSURE: 110 MMHG | DIASTOLIC BLOOD PRESSURE: 70 MMHG | BODY MASS INDEX: 31.37 KG/M2

## 2020-07-14 DIAGNOSIS — R11.2 NON-INTRACTABLE VOMITING WITH NAUSEA, UNSPECIFIED VOMITING TYPE: ICD-10-CM

## 2020-07-14 PROCEDURE — 99213 OFFICE O/P EST LOW 20 MIN: CPT | Performed by: NURSE PRACTITIONER

## 2020-07-14 PROCEDURE — 1036F TOBACCO NON-USER: CPT | Performed by: NURSE PRACTITIONER

## 2020-07-14 PROCEDURE — 3008F BODY MASS INDEX DOCD: CPT | Performed by: OBSTETRICS & GYNECOLOGY

## 2020-07-14 RX ORDER — ONDANSETRON 4 MG/1
4 TABLET, ORALLY DISINTEGRATING ORAL EVERY 6 HOURS PRN
Qty: 40 TABLET | Refills: 0 | Status: SHIPPED | OUTPATIENT
Start: 2020-07-14 | End: 2020-09-22 | Stop reason: ALTCHOICE

## 2020-07-14 NOTE — LETTER
July 14, 2020     Patient: Huong Cosby   YOB: 2000   Date of Visit: 7/14/2020       To Whom it May Concern:    Vesta Rodriguez is under my professional care  She was seen in my office on 7/14/2020  She may return to work on 7/20/2020  If you have any questions or concerns, please don't hesitate to call           Sincerely,          Luis Goodell, CRNP        CC: No Recipients

## 2020-07-14 NOTE — PROGRESS NOTES
Assessment/Plan:      1  Non-intractable vomiting with nausea, unspecified vomiting type  Continue zofran as needed  Continue fluids and follow with OB  Call if you need anything  rto as needed  Subjective:      Patient ID: Huong Csoby is a 23 y o  female  Here today with concern regarding vomiting with pregnancy  Is 6 weeks pregnant ( first and only other pregnancy ended in miscarriage prior to 6 weeks)  Went to er 3 days ago after vomitting for 28 hours   Was given iv fluids and was given zofran which has helped her since and has had no further vomiting and is keeping fluids down  Has not seen her OB yet ( established with [place] ) but will call them            OBJECTIVE  Past Medical History:   Diagnosis Date    Anemia     Anxiety     Asthma     Depression     Endometriosis     Frequent sinus infections     GERD (gastroesophageal reflux disease)     Miscarriage     Ovarian cyst     Syncope      temporarily  Wt Readings from Last 3 Encounters:   07/14/20 88 5 kg (195 lb 3 2 oz) (97 %, Z= 1 86)*   07/11/20 86 2 kg (190 lb) (96 %, Z= 1 78)*   05/29/20 94 8 kg (209 lb) (98 %, Z= 2 07)*     * Growth percentiles are based on CDC (Girls, 2-20 Years) data  BP Readings from Last 3 Encounters:   07/14/20 110/70   07/11/20 129/80   05/29/20 118/72     Pulse Readings from Last 3 Encounters:   07/14/20 86   07/11/20 90   05/29/20 100     BMI Readings from Last 3 Encounters:   07/14/20 31 99 kg/m² (95 %, Z= 1 66)*   07/11/20 30 67 kg/m² (94 %, Z= 1 55)*   05/29/20 34 78 kg/m² (97 %, Z= 1 87)*     * Growth percentiles are based on CDC (Girls, 2-20 Years) data  Physical Exam   Constitutional: She appears well-developed and well-nourished  Cardiovascular: Normal rate and regular rhythm  Pulmonary/Chest: Effort normal and breath sounds normal    Skin: Skin is warm  Psychiatric: She has a normal mood and affect   Her behavior is normal  Judgment and thought content normal

## 2020-07-15 PROCEDURE — 76830 TRANSVAGINAL US NON-OB: CPT | Performed by: OBSTETRICS & GYNECOLOGY

## 2020-07-15 NOTE — PROGRESS NOTES
Assessment/Plan:     @ 6 weeks 4 days c/w LMP, EDC 3/4/2021  HEG    Advised to continue Zofran, bland meals, focus on hydration  Schedule ONB intake, f/u 8 week viability ramsey Vallejo is a 23 y o  female  Moira Real reports nausea, vomiting and 10+lbs weight loss since positive home urine hCG  She has not had confirmation of pregnancy yet  Light spotting x 1  Had 5 week missed  one year ago - spontaneously resolved  Cycle length: regular 28d  Pregnancy testing: at home  Pregnancy imaging: not done  Blood type: unknown  Other lab results: none  The following portions of the patient's history were reviewed and updated as appropriate: allergies, current medications, past family history, past medical history, past social history, past surgical history and problem list     Review of Systems  Pertinent items are noted in HPI  Objective      LMP 2020       Physical Exam   Constitutional: She is oriented to person, place, and time  She appears well-developed and well-nourished  Genitourinary: Vagina normal    HENT:   Head: Atraumatic  Eyes: EOM are normal    Cardiovascular: Normal rate and regular rhythm  Pulmonary/Chest: Effort normal and breath sounds normal    Abdominal: Soft  There is no tenderness  Musculoskeletal: She exhibits no edema  Neurological: She is alert and oriented to person, place, and time  Skin: Skin is warm and dry  Psychiatric: She has a normal mood and affect  Vitals reviewed      AMB US Pelvic Non OB  Date/Time: 7/15/2020 1:47 PM  Performed by: Lionel Sotelo MD  Authorized by: Lionel Sotelo MD     Procedure details:     Indications comment:  Amenorrhea    Technique:  Transvaginal US, Non-OB    Position: lithotomy exam    Uterine findings:     Adnexal mass: not identified      Myomas: not identified    Cervix findings:      Appears closed  Left ovary findings:     Left ovary:  Visualized    Cysts: not identified    Right ovary findings:     Right ovary:  Visualized    Cysts: identified      Length (cm): 2  Other findings:     Free pelvic fluid: not identified    Post-Procedure Details:     Impression:  Single viable intrauterine gestation c/w EGA by LMP   + yolk sac  FH< 120bpm    Tolerance:   Tolerated well, no immediate complications    Complications: no complications

## 2020-07-23 ENCOUNTER — NURSE TRIAGE (OUTPATIENT)
Dept: OTHER | Facility: OTHER | Age: 20
End: 2020-07-23

## 2020-07-23 NOTE — TELEPHONE ENCOUNTER
Reason for Disposition   [1] Stool is light gray or whitish AND [2] unexplained    Answer Assessment - Initial Assessment Questions  1  COLOR: "What color is it?" "Is that color in part or all of the stool?"      White colored stool  In all of stool  2  ONSET: "When was the unusual color first noted?"      First noticed today  3  CAUSE: "Have you eaten any food or taken any medicine of this color?" (See listing in BACKGROUND)      No, none  4  OTHER SYMPTOMS: "Do you have any other symptoms?" (e g , diarrhea, jaundice, abdominal pain, fever)  Has had a fever, since Sunday on and off  Temperature today 100 0 orally  Last normal BM was on Sunday  Today first BM, since Sunday  Usually goes once a day  No straining with BM today  White colored, yunier consistency  Protocols used: STOOLS - UNUSUAL COLOR-ADULT-AH     Patient is 8 weeks pregnant

## 2020-07-23 NOTE — TELEPHONE ENCOUNTER
Regarding: Constipated  ----- Message from Rashaad Way sent at 7/23/2020  5:22 PM EDT -----  "I have been constipated for about 4 days  Today I had a hard time going  It was white in color    I have had a low grade fever on and off, also "

## 2020-07-24 ENCOUNTER — OFFICE VISIT (OUTPATIENT)
Dept: FAMILY MEDICINE CLINIC | Facility: CLINIC | Age: 20
End: 2020-07-24
Payer: COMMERCIAL

## 2020-07-24 ENCOUNTER — TELEPHONE (OUTPATIENT)
Dept: OBGYN CLINIC | Facility: CLINIC | Age: 20
End: 2020-07-24

## 2020-07-24 VITALS
DIASTOLIC BLOOD PRESSURE: 70 MMHG | SYSTOLIC BLOOD PRESSURE: 120 MMHG | WEIGHT: 186.7 LBS | BODY MASS INDEX: 30.01 KG/M2 | HEART RATE: 104 BPM | HEIGHT: 66 IN | TEMPERATURE: 97.7 F | RESPIRATION RATE: 16 BRPM

## 2020-07-24 DIAGNOSIS — R19.5 CHANGE IN STOOL: ICD-10-CM

## 2020-07-24 DIAGNOSIS — R11.2 NON-INTRACTABLE VOMITING WITH NAUSEA, UNSPECIFIED VOMITING TYPE: Primary | ICD-10-CM

## 2020-07-24 DIAGNOSIS — R10.13 EPIGASTRIC PAIN: ICD-10-CM

## 2020-07-24 PROCEDURE — 99213 OFFICE O/P EST LOW 20 MIN: CPT | Performed by: FAMILY MEDICINE

## 2020-07-24 PROCEDURE — 1036F TOBACCO NON-USER: CPT | Performed by: FAMILY MEDICINE

## 2020-07-24 NOTE — PROGRESS NOTES
Assessment/Plan:     Diagnosis ICD-10-CM Associated Orders   1  Non-intractable vomiting with nausea, unspecified vomiting type R11 2 CBC and differential     Comprehensive metabolic panel     CBC and differential     Comprehensive metabolic panel   2  Epigastric pain R10 13 US abdomen limited   3  Change in stool R19 5 Comprehensive metabolic panel     Plan:   - discussed small amount of water or ginger ale frequently and BRAT diet, continue Zofran as needed, will obtain lab work and US abdomen and call patient with results, advised to continue Pantoprazole 40 mg daily, discussed adequate fluids and fiber in diet to prevent constipation, advised to follow up with OB or go to ER if her symptoms persist or worsen  Also discussed COVID-19 testing which patient declined  The patient understands and agrees with the treatment plan  Subjective:   Chief Complaint   Patient presents with    Constipation    Vomiting     Pt is pregnant     Stool Color Change     Pt states her stool is white and yunier like      Patient ID: Faustino Rome is a 21 y o  female who is 8 weeks pregnant and has had intermittent morning sickness for the past 3-4 weeks  Patient reports increased vomiting, frequent belching and epigastric/ RUQ abdominal discomfort for the past 2 days, she has been constipated for the past 4-5 days, she had a small bowel movement yesterday which was light grey/ whitish in color  She has been having low grade fevers since Sunday , her temp yesterday evening was 100 0 orally, she is feeling tired, she had a toast yesterday morning and has not been able to keep anything down since then  No cough, no sore throat, no chest pain, no shortness of breath, no dizziness, no headache, no recent change in her psych medications, no recent travel or exposure to a person who is positive or being investigated for COVID-19         The following portions of the patient's history were reviewed and updated as appropriate: allergies, current medications, past family history, past medical history, past social history, past surgical history and problem list     Past Medical History:   Diagnosis Date    Anemia     Anxiety     Asthma     Depression     Endometriosis     Frequent sinus infections     GERD (gastroesophageal reflux disease)     Miscarriage     Ovarian cyst     Syncope      Past Surgical History:   Procedure Laterality Date    TONSILLECTOMY  2006    UPPER GASTROINTESTINAL ENDOSCOPY  05/18/2017    Dysphagia, dilated to [address]   UPPER GASTROINTESTINAL ENDOSCOPY  10/07/2016    Dysphagia, dilated to [address] [facility]  Biopsies negative for eosinophilic esophagitis    UPPER GASTROINTESTINAL ENDOSCOPY  02/22/2018    Dysphagia, dilated to [address]    Biopsies negative for eosinophilic esophagitis    WISDOM TOOTH EXTRACTION       Family History   Problem Relation Age of Onset    Hypertension Mother    Espinoza Abt Arthritis Mother     Thyroid disease Mother     Colon polyps Mother     Hyperlipidemia Father     Hypertension Maternal Grandfather     Stroke Maternal Grandfather     Myasthenia gravis Maternal Grandfather     Diabetes Paternal Grandfather     Stroke Paternal Grandfather     Cancer Paternal Grandfather     Bipolar disorder Sister     Suicide Attempts Sister     Mental illness Other     Colon cancer Other     Thyroid disease Maternal Grandmother     Colon polyps Maternal Grandmother     Bipolar disorder Paternal Aunt     Drug abuse Paternal Aunt     Eating disorder Paternal Aunt     Lung cancer Maternal Uncle     Substance Abuse Neg Hx      Social History     Socioeconomic History    Marital status: Single     Spouse name: Not on file    Number of children: Not on file    Years of education: Not on file    Highest education level: High school graduate   Occupational History    Occupation: employeed    Social Needs    Financial resource strain: Not hard at all   Radha-Albert insecurity: Worry: Never true     Inability: Never true    Transportation needs:     Medical: No     Non-medical: No   Tobacco Use    Smoking status: Never Smoker    Smokeless tobacco: Never Used   Substance and Sexual Activity    Alcohol use: Yes     Comment: social    Drug use: Not Currently     Types: Marijuana     Comment: last time a year ago     Sexual activity: Yes     Partners: Male     Birth control/protection: Pill   Lifestyle    Physical activity:     Days per week: 4 days     Minutes per session: 90 min    Stress:  To some extent   Relationships    Social connections:     Talks on phone: More than three times a week     Gets together: More than three times a week     Attends Hoahaoism service: More than 4 times per year     Active member of club or organization: No     Attends meetings of clubs or organizations: Never     Relationship status: Never     Intimate partner violence:     Fear of current or ex partner: No     Emotionally abused: No     Physically abused: No     Forced sexual activity: No   Other Topics Concern    Not on file   Social History Narrative    Not on file       Current Outpatient Medications:     albuterol (PROVENTIL HFA,VENTOLIN HFA) 90 mcg/act inhaler, Inhale 2 puffs, Disp: , Rfl:     desvenlafaxine succinate (PRISTIQ) 50 mg 24 hr tablet, Take 50 mg by mouth daily, Disp: , Rfl:     fluticasone (FLONASE) 50 mcg/act nasal spray, 2 sprays into each nostril daily, Disp: 16 g, Rfl: 0    hydrOXYzine HCL (ATARAX) 10 mg tablet, as needed , Disp: , Rfl:     ondansetron (ZOFRAN-ODT) 4 mg disintegrating tablet, Take 1 tablet (4 mg total) by mouth every 6 (six) hours as needed for nausea or vomiting, Disp: 40 tablet, Rfl: 0    pantoprazole (PROTONIX) 40 mg tablet, Take 1 tablet (40 mg total) by mouth daily, Disp: 90 tablet, Rfl: 3    Prenatal Multivit-Min-Fe-FA (PRENATAL 1 + IRON PO), Take by mouth daily, Disp: , Rfl:     QUEtiapine (SEROquel) 25 mg tablet, Take 12 5 mg by mouth as needed , Disp: , Rfl:     sucralfate (CARAFATE) 1 g tablet, Take 1 tablet (1 g total) by mouth 2 (two) times a day, Disp: 180 tablet, Rfl: 3    Review of Systems   Constitutional: Positive for appetite change, fatigue and fever  Negative for chills  HENT: Negative for congestion, ear pain, rhinorrhea, sore throat, trouble swallowing and voice change  Respiratory: Negative for cough, shortness of breath and wheezing  Cardiovascular: Negative for chest pain and palpitations  Gastrointestinal: Positive for abdominal pain, constipation, nausea and vomiting  Negative for diarrhea  Genitourinary: Negative for dysuria, frequency, hematuria, pelvic pain and urgency  Skin: Negative for rash  Neurological: Negative for dizziness and headaches  Hematological: Negative for adenopathy  Objective:    Vitals:    07/24/20 1051   BP: 120/70   BP Location: Left arm   Patient Position: Sitting   Cuff Size: Adult   Pulse: 104   Resp: 16   Temp: 97 7 °F (36 5 °C)   Weight: 84 7 kg (186 lb 11 2 oz)   Height: 5' 5 5" (1 664 m)        Physical Exam   Constitutional: She is oriented to person, place, and time  She appears well-developed and well-nourished  No distress  HENT:   Head: Normocephalic and atraumatic  Right Ear: Tympanic membrane and ear canal normal    Left Ear: Tympanic membrane and ear canal normal    Mouth/Throat: Mucous membranes are dry  No oropharyngeal exudate or posterior oropharyngeal erythema  Eyes: No scleral icterus  Neck: Neck supple  Cardiovascular: Normal rate, regular rhythm and normal heart sounds  No murmur heard  Pulmonary/Chest: Effort normal and breath sounds normal  No respiratory distress  She has no wheezes  She has no rhonchi  She has no rales  Abdominal: Soft  There is tenderness in the right upper quadrant and epigastric area  There is no rebound, no guarding, no CVA tenderness and negative Perez's sign     Lymphadenopathy:     She has no cervical adenopathy  Neurological: She is alert and oriented to person, place, and time  Psychiatric: She has a normal mood and affect   Her behavior is normal  Thought content normal

## 2020-07-24 NOTE — TELEPHONE ENCOUNTER
Patient called, 8 weeks OB, Gi Po, c/o constipation, nausea, vomiting  Advised small frequent meals, ginger ale, Miralax for constipation  Advised move up appointment for OB confirmation and discuss with MD then

## 2020-07-26 ENCOUNTER — HOSPITAL ENCOUNTER (EMERGENCY)
Facility: HOSPITAL | Age: 20
Discharge: HOME | End: 2020-07-26
Attending: EMERGENCY MEDICINE
Payer: COMMERCIAL

## 2020-07-26 VITALS
HEIGHT: 66 IN | TEMPERATURE: 97.4 F | DIASTOLIC BLOOD PRESSURE: 68 MMHG | RESPIRATION RATE: 16 BRPM | HEART RATE: 99 BPM | OXYGEN SATURATION: 98 % | SYSTOLIC BLOOD PRESSURE: 126 MMHG | WEIGHT: 185 LBS | BODY MASS INDEX: 29.73 KG/M2

## 2020-07-26 DIAGNOSIS — O26.899 ABDOMINAL PAIN IN PREGNANCY: Primary | ICD-10-CM

## 2020-07-26 DIAGNOSIS — R11.2 NAUSEA AND VOMITING: ICD-10-CM

## 2020-07-26 DIAGNOSIS — R10.9 ABDOMINAL PAIN IN PREGNANCY: Primary | ICD-10-CM

## 2020-07-26 LAB
ALBUMIN SERPL BCP-MCNC: 4 G/DL (ref 3.5–5)
ALP SERPL-CCNC: 80 U/L (ref 46–116)
ALT SERPL W P-5'-P-CCNC: 35 U/L (ref 12–78)
ANION GAP SERPL CALCULATED.3IONS-SCNC: 13 MMOL/L (ref 4–13)
AST SERPL W P-5'-P-CCNC: 22 U/L (ref 5–45)
B-HCG SERPL-ACNC: ABNORMAL MIU/ML
BASOPHILS # BLD AUTO: 0.02 THOUSANDS/ΜL (ref 0–0.1)
BASOPHILS NFR BLD AUTO: 0 % (ref 0–1)
BILIRUB SERPL-MCNC: 0.5 MG/DL (ref 0.2–1)
BUN SERPL-MCNC: 9 MG/DL (ref 5–25)
CALCIUM SERPL-MCNC: 9.2 MG/DL (ref 8.3–10.1)
CHLORIDE SERPL-SCNC: 98 MMOL/L (ref 100–108)
CLARITY, POC: CLEAR
CO2 SERPL-SCNC: 24 MMOL/L (ref 21–32)
COLOR, POC: YELLOW
CREAT SERPL-MCNC: 0.64 MG/DL (ref 0.6–1.3)
EOSINOPHIL # BLD AUTO: 0.27 THOUSAND/ΜL (ref 0–0.61)
EOSINOPHIL NFR BLD AUTO: 3 % (ref 0–6)
ERYTHROCYTE [DISTWIDTH] IN BLOOD BY AUTOMATED COUNT: 15.2 % (ref 11.6–15.1)
EXT BILIRUBIN, UA: NORMAL
EXT BLOOD URINE: NEGATIVE
EXT GLUCOSE, UA: NEGATIVE
EXT KETONES: NORMAL
EXT NITRITE, UA: NEGATIVE
EXT PH, UA: 6
EXT PREG TEST URINE: ABNORMAL
EXT PROTEIN, UA: 30
EXT SPECIFIC GRAVITY, UA: 1.03
EXT UROBILINOGEN: 0.2
EXT. CONTROL ED NAV: ABNORMAL
GFR SERPL CREATININE-BSD FRML MDRD: 129 ML/MIN/1.73SQ M
GLUCOSE SERPL-MCNC: 77 MG/DL (ref 65–140)
HCT VFR BLD AUTO: 37.6 % (ref 34.8–46.1)
HGB BLD-MCNC: 11.9 G/DL (ref 11.5–15.4)
IMM GRANULOCYTES # BLD AUTO: 0.05 THOUSAND/UL (ref 0–0.2)
IMM GRANULOCYTES NFR BLD AUTO: 1 % (ref 0–2)
LIPASE SERPL-CCNC: 104 U/L (ref 73–393)
LYMPHOCYTES # BLD AUTO: 1.47 THOUSANDS/ΜL (ref 0.6–4.47)
LYMPHOCYTES NFR BLD AUTO: 17 % (ref 14–44)
MCH RBC QN AUTO: 24.2 PG (ref 26.8–34.3)
MCHC RBC AUTO-ENTMCNC: 31.6 G/DL (ref 31.4–37.4)
MCV RBC AUTO: 77 FL (ref 82–98)
MONOCYTES # BLD AUTO: 0.6 THOUSAND/ΜL (ref 0.17–1.22)
MONOCYTES NFR BLD AUTO: 7 % (ref 4–12)
NEUTROPHILS # BLD AUTO: 6.32 THOUSANDS/ΜL (ref 1.85–7.62)
NEUTS SEG NFR BLD AUTO: 72 % (ref 43–75)
NRBC BLD AUTO-RTO: 0 /100 WBCS
PLATELET # BLD AUTO: 287 THOUSANDS/UL (ref 149–390)
PMV BLD AUTO: 10.1 FL (ref 8.9–12.7)
POTASSIUM SERPL-SCNC: 3.7 MMOL/L (ref 3.5–5.3)
PROT SERPL-MCNC: 7.8 G/DL (ref 6.4–8.2)
RBC # BLD AUTO: 4.91 MILLION/UL (ref 3.81–5.12)
SODIUM SERPL-SCNC: 135 MMOL/L (ref 136–145)
WBC # BLD AUTO: 8.73 THOUSAND/UL (ref 4.31–10.16)
WBC # BLD EST: NEGATIVE 10*3/UL

## 2020-07-26 PROCEDURE — 99284 EMERGENCY DEPT VISIT MOD MDM: CPT | Performed by: PHYSICIAN ASSISTANT

## 2020-07-26 PROCEDURE — 96361 HYDRATE IV INFUSION ADD-ON: CPT

## 2020-07-26 PROCEDURE — 83690 ASSAY OF LIPASE: CPT

## 2020-07-26 PROCEDURE — 84702 CHORIONIC GONADOTROPIN TEST: CPT

## 2020-07-26 PROCEDURE — 80053 COMPREHEN METABOLIC PANEL: CPT

## 2020-07-26 PROCEDURE — 36415 COLL VENOUS BLD VENIPUNCTURE: CPT

## 2020-07-26 PROCEDURE — 85025 COMPLETE CBC W/AUTO DIFF WBC: CPT

## 2020-07-26 PROCEDURE — 99284 EMERGENCY DEPT VISIT MOD MDM: CPT

## 2020-07-26 PROCEDURE — 96376 TX/PRO/DX INJ SAME DRUG ADON: CPT

## 2020-07-26 PROCEDURE — 96374 THER/PROPH/DIAG INJ IV PUSH: CPT

## 2020-07-26 PROCEDURE — 81025 URINE PREGNANCY TEST: CPT

## 2020-07-26 RX ORDER — ONDANSETRON 2 MG/ML
4 INJECTION INTRAMUSCULAR; INTRAVENOUS ONCE
Status: COMPLETED | OUTPATIENT
Start: 2020-07-26 | End: 2020-07-26

## 2020-07-26 RX ORDER — ACETAMINOPHEN 325 MG/1
650 TABLET ORAL ONCE
Status: COMPLETED | OUTPATIENT
Start: 2020-07-26 | End: 2020-07-26

## 2020-07-26 RX ADMIN — SODIUM CHLORIDE 1000 ML: 0.9 INJECTION, SOLUTION INTRAVENOUS at 18:22

## 2020-07-26 RX ADMIN — ONDANSETRON 4 MG: 2 INJECTION INTRAMUSCULAR; INTRAVENOUS at 19:55

## 2020-07-26 RX ADMIN — ACETAMINOPHEN 650 MG: 325 TABLET ORAL at 19:55

## 2020-07-26 RX ADMIN — ONDANSETRON 4 MG: 2 INJECTION INTRAMUSCULAR; INTRAVENOUS at 18:26

## 2020-07-27 ENCOUNTER — HOSPITAL ENCOUNTER (OUTPATIENT)
Dept: ULTRASOUND IMAGING | Facility: HOSPITAL | Age: 20
Discharge: HOME | End: 2020-07-27
Payer: COMMERCIAL

## 2020-07-27 DIAGNOSIS — R10.13 EPIGASTRIC PAIN: ICD-10-CM

## 2020-07-27 PROCEDURE — 76705 ECHO EXAM OF ABDOMEN: CPT

## 2020-07-27 NOTE — DISCHARGE INSTRUCTIONS
Rest, increase fluids  Take unisom as needed for nausea  Follow up with OB/GYN for recheck  Keep appointment for ultrasound tomorrow

## 2020-07-27 NOTE — ED PROVIDER NOTES
History  Chief Complaint   Patient presents with    Abdominal Pain Pregnant     patient reports abdominal pressure since this morning  denies spotting  states that she has been vomiting with inability to keep solids or liquids down  8 weeks pregnant      Patient is a 20 y/o F,  about 8 weeks 4 days pregnant that presents to the ED with nausea, vomiting and abdominal pain  She states she saw her PCP for this and was started on zofran and has an ultrasound scheduled for tomorrow  She states her temperature has been 100  She denies diarrhea  No dysuria  She states she has been unable to keep anything down even with zofran  No sick contacts or foreign travel  History provided by:  Patient  Nausea   The primary symptoms include abdominal pain, nausea and vomiting  Primary symptoms do not include fever, diarrhea, dysuria or rash  The illness began more than 7 days ago  The problem has not changed since onset  The illness does not include chills or back pain  Prior to Admission Medications   Prescriptions Last Dose Informant Patient Reported? Taking?    Prenatal Multivit-Min-Fe-FA (PRENATAL 1 + IRON PO)   Yes No   Sig: Take by mouth daily   QUEtiapine (SEROquel) 25 mg tablet  Self Yes No   Sig: Take 12 5 mg by mouth as needed    albuterol (PROVENTIL HFA,VENTOLIN HFA) 90 mcg/act inhaler  Self Yes No   Sig: Inhale 2 puffs   desvenlafaxine succinate (PRISTIQ) 50 mg 24 hr tablet  Self Yes No   Sig: Take 50 mg by mouth daily   fluticasone (FLONASE) 50 mcg/act nasal spray  Self No No   Si sprays into each nostril daily   hydrOXYzine HCL (ATARAX) 10 mg tablet  Self Yes No   Sig: as needed    ondansetron (ZOFRAN-ODT) 4 mg disintegrating tablet   No No   Sig: Take 1 tablet (4 mg total) by mouth every 6 (six) hours as needed for nausea or vomiting   pantoprazole (PROTONIX) 40 mg tablet  Self No No   Sig: Take 1 tablet (40 mg total) by mouth daily   sucralfate (CARAFATE) 1 g tablet  Self No No   Sig: Take 1 tablet (1 g total) by mouth 2 (two) times a day      Facility-Administered Medications: None       Past Medical History:   Diagnosis Date    Anemia     Anxiety     Asthma     Depression     Endometriosis     Frequent sinus infections     GERD (gastroesophageal reflux disease)     Miscarriage     Ovarian cyst     Syncope        Past Surgical History:   Procedure Laterality Date    TONSILLECTOMY  2006    UPPER GASTROINTESTINAL ENDOSCOPY  05/18/2017    Dysphagia, dilated to [facility]   UPPER GASTROINTESTINAL ENDOSCOPY  10/07/2016    Dysphagia, dilated to [address] [facility]  Biopsies negative for eosinophilic esophagitis    UPPER GASTROINTESTINAL ENDOSCOPY  02/22/2018    Dysphagia, dilated to [address] [facility]  Biopsies negative for eosinophilic esophagitis    WISDOM TOOTH EXTRACTION         Family History   Problem Relation Age of Onset    Hypertension Mother    [facility] Arthritis Mother     Thyroid disease Mother     Colon polyps Mother     Hyperlipidemia Father     Hypertension Maternal Grandfather     Stroke Maternal Grandfather     Myasthenia gravis Maternal Grandfather     Diabetes Paternal Grandfather     Stroke Paternal Grandfather     Cancer Paternal Grandfather     Bipolar disorder Sister     Suicide Attempts Sister     Mental illness Other     Colon cancer Other     Thyroid disease Maternal Grandmother     Colon polyps Maternal Grandmother     Bipolar disorder Paternal Aunt     Drug abuse Paternal Aunt     Eating disorder Paternal [de-identified]     Lung cancer Maternal Uncle     Substance Abuse Neg Hx      I have reviewed and agree with the history as documented      E-Cigarette/Vaping    E-Cigarette Use Never User      E-Cigarette/Vaping Substances     Social History     Tobacco Use    Smoking status: Never Smoker    Smokeless tobacco: Never Used   Substance Use Topics    Alcohol use: Yes     Comment: social    Drug use: Not Currently     Types: Marijuana     Comment: last time a year ago Review of Systems   Constitutional: Negative for chills and fever  HENT: Negative  Respiratory: Negative for cough and shortness of breath  Cardiovascular: Negative for chest pain and leg swelling  Gastrointestinal: Positive for abdominal pain, nausea and vomiting  Negative for diarrhea  Genitourinary: Negative for dysuria  Musculoskeletal: Negative for back pain and neck pain  Skin: Negative for color change and rash  Neurological: Negative for dizziness, weakness and numbness  Psychiatric/Behavioral: Negative for confusion  All other systems reviewed and are negative  Physical Exam  Physical Exam   Constitutional: She is oriented to person, place, and time  She appears well-developed and well-nourished  She is cooperative  She appears ill  HENT:   Head: Normocephalic and atraumatic  Nose: Nose normal    Mouth/Throat: Mucous membranes are pale and dry  Eyes: Conjunctivae are normal    Neck: Normal range of motion  Cardiovascular: Normal rate, regular rhythm and normal heart sounds  Pulmonary/Chest: Effort normal and breath sounds normal  She has no wheezes  She has no rhonchi  She has no rales  Abdominal: Soft  Normal appearance and bowel sounds are normal  There is tenderness in the right upper quadrant and epigastric area  There is no rigidity, no rebound and no guarding  Musculoskeletal: Normal range of motion  She exhibits no edema  Neurological: She is alert and oriented to person, place, and time  She has normal strength  No sensory deficit  Gait normal    Skin: Skin is warm and dry  No rash noted  She is not diaphoretic  No pallor  Psychiatric: She has a normal mood and affect  Her speech is normal  Cognition and memory are normal    Nursing note and vitals reviewed        Vital Signs  ED Triage Vitals   Temperature Pulse Respirations Blood Pressure SpO2   07/26/20 1807 07/26/20 1807 07/26/20 1807 07/26/20 1807 07/26/20 1807   (!) 97 4 °F (36 3 °C) 104 16 123/65 98 %      Temp Source Heart Rate Source Patient Position - Orthostatic VS BP Location FiO2 (%)   07/26/20 1807 07/26/20 1807 07/26/20 2025 07/26/20 2025 --   Oral Monitor Lying Left arm       Pain Score       07/26/20 1807       7           Vitals:    07/26/20 1807 07/26/20 2025   BP: 123/65 126/68   Pulse: 104 99   Patient Position - Orthostatic VS:  Lying         Visual Acuity      ED Medications  Medications   ondansetron (ZOFRAN) injection 4 mg (4 mg Intravenous Given 7/26/20 1826)   sodium chloride 0 9 % bolus 1,000 mL (0 mL Intravenous Stopped 7/26/20 1953)   acetaminophen (TYLENOL) tablet 650 mg (650 mg Oral Given 7/26/20 1955)   ondansetron (ZOFRAN) injection 4 mg (4 mg Intravenous Given 7/26/20 1955)       Diagnostic Studies  Results Reviewed     Procedure Component Value Units Date/Time    POCT urinalysis dipstick [[id number]]  (Normal) Resulted:  07/26/20 1951    Lab Status:  Final result Specimen:  Urine, Other Updated:  07/26/20 1952     Color, UA YELLOW     Clarity, UA CLEAR     Glucose, UA (Ref: Negative) NEGATIVE     Bilirubin, UA (Ref: Negative) SMALL     Ketones, UA (Ref: Negative) LARGE     Spec Grav, UA (Ref:1 003-1 030) 1 030     Blood, UA (Ref: Negative) NEGATIVE     pH, UA (Ref: 4 5-8 0) 6 0     Protein, UA (Ref: Negative) 30     Urobilinogen, UA (Ref: 0 2- 1 0) 0 2      Leukocytes, UA (Ref: Negative) NEGATIVE     Nitrite, UA (Ref: Negative) NEGATIVE    POCT pregnancy, urine [[id number]]  (Abnormal) Resulted:  07/26/20 1951    Lab Status:  Final result Specimen:  Urine Updated:  07/26/20 1951     EXT PREG TEST UR (Ref: Negative) POSITIVE (+)     Control VALID    Lipase [[id number]]  (Normal) Collected:  07/26/20 1822    Lab Status:  Final result Specimen:  Blood from Arm, Right Updated:  07/26/20 1917     Lipase 104 u/L     hCG, quantitative, pregnancy [[id number]]  (Abnormal) Collected:  07/26/20 1822    Lab Status:  Final result Specimen:  Blood from Arm, Right Updated:  07/26/20 1917 HCG, Quant 143,709 mIU/mL     Narrative:        Expected Ranges:     Approximate               Approximate HCG  Gestation age          Concentration ( mIU/mL)  _____________          ______________________   Ally Miller                      HCG values  0 2-1                       5-50  1-2                           2-3                         100-5000  3-4                         500-08844  4-5                         1000-49402  5-6                         [id number]  6-8                         [id number]  8-12                        48610-746178      Comprehensive metabolic panel [[id number]]  (Abnormal) Collected:  07/26/20 1822    Lab Status:  Final result Specimen:  Blood from Arm, Right Updated:  07/26/20 1848     Sodium 135 mmol/L      Potassium 3 7 mmol/L      Chloride 98 mmol/L      CO2 24 mmol/L      ANION GAP 13 mmol/L      BUN 9 mg/dL      Creatinine 0 64 mg/dL      Glucose 77 mg/dL      Calcium 9 2 mg/dL      AST 22 U/L      ALT 35 U/L      Alkaline Phosphatase 80 U/L      Total Protein 7 8 g/dL      Albumin 4 0 g/dL      Total Bilirubin 0 50 mg/dL      eGFR 129 ml/min/1 73sq m     Narrative:       [place] guidelines for Chronic Kidney Disease (CKD):     Stage 1 with normal or high GFR (GFR > 90 mL/min/1 73 square meters)    Stage 2 Mild CKD (GFR = 60-89 mL/min/1 73 square meters)    Stage 3A Moderate CKD (GFR = 45-59 mL/min/1 73 square meters)    Stage 3B Moderate CKD (GFR = 30-44 mL/min/1 73 square meters)    Stage 4 Severe CKD (GFR = 15-29 mL/min/1 73 square meters)    Stage 5 End Stage CKD (GFR <15 mL/min/1 73 square meters)  Note: GFR calculation is accurate only with a steady state creatinine    CBC and differential [[id number]]  (Abnormal) Collected:  07/26/20 1822    Lab Status:  Final result Specimen:  Blood from Arm, Right Updated:  07/26/20 1830     WBC 8 73 Thousand/uL      RBC 4 91 Million/uL      Hemoglobin 11 9 g/dL      Hematocrit 37 6 %      MCV 77 fL MCH 24 2 pg      MCHC 31 6 g/dL      RDW 15 2 %      MPV 10 1 fL      Platelets 466 Thousands/uL      nRBC 0 /100 WBCs      Neutrophils Relative 72 %      Immat GRANS % 1 %      Lymphocytes Relative 17 %      Monocytes Relative 7 %      Eosinophils Relative 3 %      Basophils Relative 0 %      Neutrophils Absolute 6 32 Thousands/µL      Immature Grans Absolute 0 05 Thousand/uL      Lymphocytes Absolute 1 47 Thousands/µL      Monocytes Absolute 0 60 Thousand/µL      Eosinophils Absolute 0 27 Thousand/µL      Basophils Absolute 0 02 Thousands/µL                  No orders to display              Procedures  Procedures         ED Course           CRAFFT      Most Recent Value   During the past 12 months, did you:   1  Drink any alcohol (more than a few sips)? No Filed at: 07/26/2020 1807   2  Smoke any marijuana or hashish  No Filed at: 07/26/2020 1807   3  Use anything else to get high? ("anything else" includes illegal drugs, over the counter and prescription drugs, and things that you sniff or 'hale')? No Filed at: 07/26/2020 1807                                        MDM  Number of Diagnoses or Management Options  Abdominal pain in pregnancy: new and requires workup  Nausea and vomiting: new and requires workup  Diagnosis management comments: Patient with nausea and vomiting and abdominal pain, will check labs, and give IV fluids and reassess  Labs normal, no need for emergent u/s since patient is scheduled tomorrow  Patient tolerated po tylenol, will d/c  ADvised f/u with OB and unisom for nausea          Amount and/or Complexity of Data Reviewed  Clinical lab tests: ordered and reviewed    Patient Progress  Patient progress: stable        Disposition  Final diagnoses:   Abdominal pain in pregnancy   Nausea and vomiting     Time reflects when diagnosis was documented in both MDM as applicable and the Disposition within this note     Time User Action Codes Description Comment    7/26/2020  8:16 PM Espinoza Costellock Add [C44 668,  R10 9] Abdominal pain in pregnancy     7/26/2020  8:16 PM Marylizzie Jim Add [R11 2] Nausea and vomiting       ED Disposition     ED Disposition Condition Date/Time Comment    Discharge Stable Sun Jul 26, 2020  8:16 PM Garret Holter discharge to home/self care  Follow-up Information     Follow up With Specialties Details Why Contact Info    ob/gyn  Call in 1 day For recheck           Discharge Medication List as of 7/26/2020  8:17 PM      CONTINUE these medications which have NOT CHANGED    Details   albuterol (PROVENTIL HFA,VENTOLIN HFA) 90 mcg/act inhaler Inhale 2 puffs, Historical Med      desvenlafaxine succinate (PRISTIQ) 50 mg 24 hr tablet Take 50 mg by mouth daily, Historical Med      fluticasone (FLONASE) 50 mcg/act nasal spray 2 sprays into each nostril daily, Starting Tue 1/22/2019, OTC      hydrOXYzine HCL (ATARAX) 10 mg tablet as needed , Starting Thu 5/28/2020, Historical Med      ondansetron (ZOFRAN-ODT) 4 mg disintegrating tablet Take 1 tablet (4 mg total) by mouth every 6 (six) hours as needed for nausea or vomiting, Starting Tue 7/14/2020, Normal      pantoprazole (PROTONIX) 40 mg tablet Take 1 tablet (40 mg total) by mouth daily, Starting Thu 8/8/2019, Print      Prenatal Multivit-Min-Fe-FA (PRENATAL 1 + IRON PO) Take by mouth daily, Historical Med      QUEtiapine (SEROquel) 25 mg tablet Take 12 5 mg by mouth as needed , Historical Med      sucralfate (CARAFATE) 1 g tablet Take 1 tablet (1 g total) by mouth 2 (two) times a day, Starting Thu 8/8/2019, Print           No discharge procedures on file      PDMP Review       Value Time User    PDMP Reviewed  Yes 7/11/2020  5:11 PM Jass Morrison DO          ED Provider  Electronically Signed by           Alex Carrillo PA-C  07/26/20 9106

## 2020-07-28 ENCOUNTER — OFFICE VISIT (OUTPATIENT)
Dept: OBGYN CLINIC | Facility: CLINIC | Age: 20
End: 2020-07-28
Payer: COMMERCIAL

## 2020-07-28 ENCOUNTER — ULTRASOUND (OUTPATIENT)
Dept: OBGYN CLINIC | Facility: CLINIC | Age: 20
End: 2020-07-28
Payer: COMMERCIAL

## 2020-07-28 ENCOUNTER — INITIAL PRENATAL (OUTPATIENT)
Dept: OBGYN CLINIC | Facility: CLINIC | Age: 20
End: 2020-07-28
Payer: COMMERCIAL

## 2020-07-28 VITALS
HEIGHT: 66 IN | SYSTOLIC BLOOD PRESSURE: 132 MMHG | DIASTOLIC BLOOD PRESSURE: 82 MMHG | WEIGHT: 182.2 LBS | TEMPERATURE: 98.4 F | BODY MASS INDEX: 29.28 KG/M2

## 2020-07-28 VITALS — TEMPERATURE: 98.4 F

## 2020-07-28 DIAGNOSIS — O26.899 ABDOMINAL PAIN AFFECTING PREGNANCY: ICD-10-CM

## 2020-07-28 DIAGNOSIS — Z36.9 ANTENATAL SCREENING ENCOUNTER: ICD-10-CM

## 2020-07-28 DIAGNOSIS — R11.10 HYPEREMESIS: ICD-10-CM

## 2020-07-28 DIAGNOSIS — R10.9 ABDOMINAL PAIN AFFECTING PREGNANCY: ICD-10-CM

## 2020-07-28 DIAGNOSIS — Z11.3 SCREENING FOR STDS (SEXUALLY TRANSMITTED DISEASES): Primary | ICD-10-CM

## 2020-07-28 DIAGNOSIS — Z3A.08 8 WEEKS GESTATION OF PREGNANCY: ICD-10-CM

## 2020-07-28 DIAGNOSIS — Z36.9 ANTENATAL SCREENING ENCOUNTER: Primary | ICD-10-CM

## 2020-07-28 DIAGNOSIS — Z34.00 SUPERVISION OF NORMAL FIRST PREGNANCY, ANTEPARTUM: Primary | ICD-10-CM

## 2020-07-28 PROCEDURE — 76817 TRANSVAGINAL US OBSTETRIC: CPT | Performed by: OBSTETRICS & GYNECOLOGY

## 2020-07-28 PROCEDURE — 87591 N.GONORRHOEAE DNA AMP PROB: CPT | Performed by: OBSTETRICS & GYNECOLOGY

## 2020-07-28 PROCEDURE — OBC: Performed by: OBSTETRICS & GYNECOLOGY

## 2020-07-28 PROCEDURE — 87491 CHLMYD TRACH DNA AMP PROBE: CPT | Performed by: OBSTETRICS & GYNECOLOGY

## 2020-07-28 PROCEDURE — 99215 OFFICE O/P EST HI 40 MIN: CPT | Performed by: OBSTETRICS & GYNECOLOGY

## 2020-07-28 PROCEDURE — 1036F TOBACCO NON-USER: CPT | Performed by: OBSTETRICS & GYNECOLOGY

## 2020-07-28 NOTE — PATIENT INSTRUCTIONS
Pregnancy   WHAT YOU NEED TO KNOW:   What do I need to know about pregnancy? A normal pregnancy lasts about 40 weeks  The first trimester lasts from your last period through the 12th week of pregnancy  The second trimester lasts from the 13th week of your pregnancy through the 23rd week  The third trimester lasts from your 24th week of pregnancy until your baby is born  If you know the date of your last period, your healthcare provider can estimate your due date  You may give birth to your baby any time from 37 weeks to 2 weeks after your due date  What is prenatal care? Prenatal care is a series of visits with your healthcare provider throughout your pregnancy  Prenatal care can help prevent problems during pregnancy and childbirth  At each prenatal visit, your healthcare provider will weigh you and check your blood pressure  Your healthcare provider will also check your baby's heartbeat and growth  You may also need the following at some visits:  · A pelvic exam  allows your healthcare provider to see your cervix (the bottom part of your uterus)  Your healthcare provider uses a speculum to gently open your vagina  He will check the size and shape of your uterus  · Blood tests  may be done to check for gestational diabetes and anemia (low iron level)  You may need other blood tests, such as blood type, Rh factor, or tests to check for birth defects  · A fetal ultrasound  shows pictures of your baby inside your uterus  It shows your baby's development  The movement and position of your baby can also be seen  Your healthcare provider may be able to tell you what your baby's gender is during the ultrasound  What can I do to have a healthy pregnancy? · Eat a variety of healthy foods  Healthy foods include fruits, vegetables, whole-grain breads, low-fat dairy foods, beans, lean meats, and fish  Drink liquids as directed  Ask how much liquid to drink each day and which liquids are best for you   Limit caffeine to less than 200 milligrams each day  Limit your intake of fish to 2 servings each week  Choose fish low in mercury such as canned light tuna, shrimp, crab, salmon, cod, or tilapia  Do not  eat fish high in mercury such as swordfish, tilefish, cee mackerel, and shark  · Take prenatal vitamins as directed  Your need for certain vitamins and minerals, such as folic acid, increases during pregnancy  Prenatal vitamins provide some of the extra vitamins and minerals you need  Prenatal vitamins may also help to decrease the risk of certain birth defects  · Ask how much weight you should gain during your pregnancy  Too much or too little weight gain can be unhealthy for you and your baby  · Talk to your healthcare provider about exercise  Moderate exercise can help you stay fit  Your healthcare provider will help you plan an exercise program that is safe for you during pregnancy  · Do not smoke  If you smoke, it is never too late to quit  Smoking increases your risk of a miscarriage and other health problems during your pregnancy  Smoking can cause your baby to be born too early or weigh less at birth  Ask your healthcare provider for information if you need help quitting  · Do not drink alcohol  Alcohol passes from your body to your baby through the placenta  It can affect your baby's brain development and cause fetal alcohol syndrome (FAS)  FAS is a group of conditions that causes mental, behavior, and growth problems  · Talk to your healthcare provider before you take any medicines  Many medicines may harm your baby if you take them when you are pregnant  Do not take any medicines, vitamins, herbs, or supplements without first talking to your healthcare provider  Never use illegal or street drugs (such as marijuana or cocaine) while you are pregnant  What body changes may happen during my pregnancy?    · Breast changes  you will experience include tenderness and tingling during the early part of your pregnancy  Your breasts will become larger  You may need to use a support bra  You may see a thin, yellow fluid, called colostrum, leak from your nipples during the second trimester  Colostrum is a liquid that changes to milk about 3 days after you give birth  · Skin changes and stretch marks  may occur during your pregnancy  You may have red marks, called stretch marks, on your skin  Stretch marks will usually fade after pregnancy  Use lotion if your skin is dry and itchy  The skin on your face, around your nipples, and below your belly button may darken  Most of the time, your skin will return to its normal color after your baby is born  · Morning sickness  is nausea and vomiting that can happen at any time of day  Avoid fatty and spicy foods  Eat small meals throughout the day instead of large meals  Venessa may help to decrease nausea  Ask your healthcare provider about other ways of decreasing nausea and vomiting  · Heartburn  may be caused by changes in your hormones during pregnancy  Your growing uterus may also push your stomach upward and force stomach acid to back up into your esophagus  Eat 4 or 5 small meals each day instead of large meals  Avoid spicy foods  Avoid eating right before bedtime  · Constipation  may develop during your pregnancy  To treat constipation, eat foods high in fiber such as fiber cereals, beans, fruits, vegetables, whole-grain breads, and prune juice  Get regular exercise and drink plenty of water  Your healthcare provider may also suggest a fiber supplement to soften your bowel movements  Talk to your healthcare provider before you use any medicines to decrease constipation  · Hemorrhoids  are enlarged veins in the rectal area  They may cause pain, itching, and bright red bleeding from your rectum  To decrease your risk of hemorrhoids, prevent constipation and do not strain to have a bowel movement   If you have hemorrhoids, soak in a tub of warm water to ease discomfort  Ask your healthcare provider how you can treat hemorrhoids  · Leg cramps and swelling  may be caused by low calcium levels or the added weight of pregnancy  Raise your legs above the level of your heart to decrease swelling  During a leg cramp, stretch or massage the muscle that has the cramp  Heat may help decrease pain and muscle spasms  Apply heat on your muscle for 20 to 30 minutes every 2 hours for as many days as directed  · Back pain  may occur as your baby grows  Do not stand for long periods of time or lift heavy items  Use good posture while you stand, squat, or bend  Wear low-heeled shoes with good support  Rest may also help to relieve back pain  Ask your healthcare provider about exercises you can do to strengthen your back muscles  What are some safety tips during pregnancy? · Avoid hot tubs and saunas  Do not use a hot tub or sauna while you are pregnant, especially during your first trimester  Hot tubs and saunas may raise your baby's temperature and increase the risk of birth defects  · Avoid toxoplasmosis  This is an infection caused by eating raw meat or being around infected cat feces  It can cause birth defects, miscarriages, and other problems  Wash your hands after you touch raw meat  Make sure any meat is well-cooked before you eat it  Avoid raw eggs and unpasteurized milk  Use gloves or ask someone else to clean your cat's litter box while you are pregnant  · Ask your healthcare provider about travel  The most comfortable time to travel is during the second trimester  Ask your healthcare provider if you can travel after 36 weeks  You may not be able to travel in an airplane after 36 weeks  He may also recommend that you avoid long road trips  When should I seek immediate care? · You develop a severe headache that does not go away  · You have new or increased vision changes, such as blurred or spotted vision      · You have new or increased swelling in your face or hands  · You have pain or cramping in your abdomen or low back  · You have vaginal bleeding  When should I contact my healthcare provider? · You have abdominal cramps, pressure, or tightening  · You have a change in vaginal discharge  · You cannot keep food or drinks down, and you are losing weight  · You have chills or a fever  · You have vaginal itching, burning, or pain  · You have yellow, green, white, or foul-smelling vaginal discharge  · You have pain or burning when you urinate, less urine than usual, or pink or bloody urine  · You have questions or concerns about your condition or care  CARE AGREEMENT:   You have the right to help plan your care  Learn about your health condition and how it may be treated  Discuss treatment options with your caregivers to decide what care you want to receive  You always have the right to refuse treatment  The above information is an  only  It is not intended as medical advice for individual conditions or treatments  Talk to your doctor, nurse or pharmacist before following any medical regimen to see if it is safe and effective for you  © 2017 [facility] Information is for End User's use only and may not be sold, redistributed or otherwise used for commercial purposes  All illustrations and images included in CareNotes® are the copyrighted property of [employer]  or Patrice Danielle  Hyperemesis Gravidarum   WHAT YOU NEED TO KNOW:   What is hyperemesis gravidarum? Hyperemesis gravidarum is a severe form of nausea and vomiting that happens during pregnancy  Hyperemesis is more severe than morning sickness  It may cause you to have nausea or vomiting all day for many days  It may also keep you from getting enough food and liquid  What causes hyperemesis gravidarum? The cause of hyperemesis is not known   Pregnancy causes changes in your hormones and blood sugar levels, which may lead to hyperemesis  Hyperemesis is more common in women who are pregnant for the first time or are pregnant with more than 1 baby  Hyperemesis is also more common in women who have certain medical conditions, such as thyroid disease  What are the signs and symptoms of hyperemesis gravidarum? Signs and symptoms usually begin in the first trimester (first 12 weeks)  Hyperemesis often goes away during the second trimester (after 20 weeks) but may continue through the entire pregnancy  · Severe nausea and vomiting, and dry retching     · Easily affected by strong smells    · Poor appetite or change in taste    · Symptoms of dehydration, such as dark yellow urine, dry mouth and lips, dry skin, and urinating less than usual    · Fatigue    · Dizziness when you stand    · Weight loss  How is hyperemesis gravidarum diagnosed? Your healthcare provider will examine you and check your weight  Blood and urine tests will show dehydration and organ function  You may need an ultrasound if you have not had one  How is hyperemesis gravidarum treated and managed? · Eat small amounts of food every 1 to 2 hours  Some examples of good foods to eat include broth, toast, crackers, fruit, eggs, gelatin, or cottage cheese  Do not eat spicy or high-fat foods  Foods and drinks with ginger, such as ginger ale, may help to decrease nausea and vomiting  · Drink liquids as directed  You may need to drink small amounts of liquid often to prevent dehydration  Ask how much liquid to drink each day and which liquids are best for you  · Rest when you need to  Start activity slowly and work up to your usual routine as you start to feel better  · Medicines, vitamins, or supplements  may be given to help decrease nausea and vomiting  · Weigh yourself daily if directed by your healthcare provider  You may need to keep a record of your daily weights for your healthcare provider   He may want to make sure you are not losing too much weight  When should I seek immediate care? · You have signs of severe dehydration including little to no urine and dry mouth or lips  · You have severe stomach pain  · You feel too weak or dizzy to stand up  · You see blood in your vomit or bowel movements  When should I contact my healthcare provider? · You cannot keep any food or liquid down  · You are losing weight  · You have a fever  · You have questions or concerns about your condition or care  CARE AGREEMENT:   You have the right to help plan your care  Learn about your health condition and how it may be treated  Discuss treatment options with your caregivers to decide what care you want to receive  You always have the right to refuse treatment  The above information is an  only  It is not intended as medical advice for individual conditions or treatments  Talk to your doctor, nurse or pharmacist before following any medical regimen to see if it is safe and effective for you  © 2017 [employer] [employer] Information is for End User's use only and may not be sold, redistributed or otherwise used for commercial purposes  All illustrations and images included in CareNotes® are the copyrighted property of A D A M [employer]  or Patrice Danielle

## 2020-07-28 NOTE — PROGRESS NOTES
Assessment/Plan   Diagnoses and all orders for this visit:    Screening for STDs (sexually transmitted diseases)  -     Chlamydia/GC amplified DNA by PCR     screening encounter  -     Prenatal Panel; Future    Hyperemesis    Abdominal pain affecting pregnancy    8 weeks gestation of pregnancy    1   8 5 weeks gestation- patient with LMP of 20 placing her at 8 5 weeks  Ultrasound today was 8 3 weeks  Small subchorionic bleed noted, 1 8 cm  Patient without any bleeding  She will call with any issues  Threatened AB precautions were reviewed  GC/chlamydia was done today  Pap was deferred  Follow-up 1-2 weeks time for prenatal visit/follow-up  2  Hyperemesis-has had documented 27 lb weight loss since 20 visit at urgent care  Patient has long history of GERD, on Protonix and Carafate  She was prescribed Zofran by primary doctor with some improvement  Practical recommendations were reviewed including fluid hydration, avoidance of mixing solids with liquids, irina, sea bands, adequate rest etc  Recommend follow-up with Dr Foreign Dhillon of GI and she plans to call him  Offered options including continue Zofran, Reglan, IV fluids, subcutaneous pump of Zofran or Reglan, even referral for hyperalimentation  Initially, she declined anything but refill of Zofran which was sent  After further discussion, we will contact optum and proceed with possible subcu pump  Orthostatic symptoms were reviewed and patient to call with any protracted nausea/vomiting for 12-24 hours, particularly accompanied by lightheadedness or dizziness  Her mother was present and she will help observe for this  3  GERD-to follow-up with Dr Foreign Dhillon  Continues on Zofran for nausea/vomiting along with Carafate and Protonix  4  Psych -has history of depression along with PTSD documented in the chart  She continues on Pristiq, which has has not been shown to be definitively related to birth defects on review    She was counseled about possible withdrawal symptoms of the baby when she delivers if she is on this medication in the 3rd trimester  She is not taking Seroquel or Atarax at this time  Strongly suggested she follow up with her psychiatry specialist to let them know she is pregnant and to help with management during pregnancy  5  Abdominal pain-noted in the mid epigastric and right upper quadrant area  None was noted today on exam   Abdominal ultrasound was done yesterday, results still pending  To follow-up with primary care doctor and Dr Sin Price of GI  To follow-up 1-2 weeks time or as needed  Subjective   Patient ID: Lubna Vallejo is a 21 y o  female  Vitals:    20 1103   BP: 132/82   Temp: 98 4 °F (36 9 °C)     Patient was seen today for pregnancy complication visit  Please see assessment plan for details  The following portions of the patient's history were reviewed and updated as appropriate: allergies, current medications, past family history, past medical history, past social history, past surgical history and problem list   Past Medical History:   Diagnosis Date    Abnormal ECG     no diagnosis    Anemia     Anxiety     Asthma     Depression     Endometriosis     Frequent sinus infections     GERD (gastroesophageal reflux disease)     Migraine     Miscarriage     Ovarian cyst     Syncope     Trauma     Varicella     vaccine     Past Surgical History:   Procedure Laterality Date    TONSILLECTOMY  2006    UPPER GASTROINTESTINAL ENDOSCOPY  2017    Dysphagia, dilated to [address]   UPPER GASTROINTESTINAL ENDOSCOPY  10/07/2016    Dysphagia, dilated to [address]  Biopsies negative for eosinophilic esophagitis    UPPER GASTROINTESTINAL ENDOSCOPY  2018    Dysphagia, dilated to [address]    Biopsies negative for eosinophilic esophagitis    WISDOM TOOTH EXTRACTION       OB History    Para Term  AB Living   2 0 0 0 1 0   SAB TAB Ectopic Multiple Live Births   1 0 0 0 0      # Outcome Date GA Lbr Lino/2nd Weight Sex Delivery Anes PTL Lv   2 Current            1 SAB                Current Outpatient Medications:     albuterol (PROVENTIL HFA,VENTOLIN HFA) 90 mcg/act inhaler, Inhale 2 puffs, Disp: , Rfl:     desvenlafaxine succinate (PRISTIQ) 50 mg 24 hr tablet, Take 50 mg by mouth daily, Disp: , Rfl:     fluticasone (FLONASE) 50 mcg/act nasal spray, 2 sprays into each nostril daily (Patient not taking: Reported on 7/28/2020), Disp: 16 g, Rfl: 0    hydrOXYzine HCL (ATARAX) 10 mg tablet, as needed , Disp: , Rfl:     ondansetron (ZOFRAN-ODT) 4 mg disintegrating tablet, Take 1 tablet (4 mg total) by mouth every 6 (six) hours as needed for nausea or vomiting, Disp: 40 tablet, Rfl: 0    pantoprazole (PROTONIX) 40 mg tablet, Take 1 tablet (40 mg total) by mouth daily, Disp: 90 tablet, Rfl: 3    Prenatal Multivit-Min-Fe-FA (PRENATAL 1 + IRON PO), Take by mouth daily, Disp: , Rfl:     QUEtiapine (SEROquel) 25 mg tablet, Take 12 5 mg by mouth as needed , Disp: , Rfl:     sucralfate (CARAFATE) 1 g tablet, Take 1 tablet (1 g total) by mouth 2 (two) times a day, Disp: 180 tablet, Rfl: 3  Allergies   Allergen Reactions    Strawberry C [Ascorbate] Anaphylaxis    Mold Extract [Trichophyton]     Cat Hair Extract      Social History     Socioeconomic History    Marital status: Single     Spouse name: None    Number of children: None    Years of education: None    Highest education level: High school graduate   Occupational History    Occupation: employeed    Social Needs    Financial resource strain: Not hard at all   [employer] insecurity:     Worry: Never true     Inability: Never true    Transportation needs:     Medical: No     Non-medical: No   Tobacco Use    Smoking status: Never Smoker    Smokeless tobacco: Never Used   Substance and Sexual Activity    Alcohol use: Not Currently     Comment: social    Drug use: Not Currently     Types: Marijuana     Comment: last time a year ago     Sexual activity: Yes     Partners: Male     Birth control/protection: None   Lifestyle    Physical activity:     Days per week: 4 days     Minutes per session: 90 min    Stress: To some extent   Relationships    Social connections:     Talks on phone: More than three times a week     Gets together: More than three times a week     Attends Christianity service: More than 4 times per year     Active member of club or organization: No     Attends meetings of clubs or organizations: Never     Relationship status: Never     Intimate partner violence:     Fear of current or ex partner: No     Emotionally abused: No     Physically abused: No     Forced sexual activity: No   Other Topics Concern    None   Social History Narrative    None     Family History   Problem Relation Age of Onset    Hypertension Mother     Arthritis Mother     Colon polyps Mother     Hyperlipidemia Father     Hypertension Maternal Grandfather     Stroke Maternal Grandfather     Myasthenia gravis Maternal Grandfather     Diabetes Paternal Grandfather     Stroke Paternal Grandfather     Cancer Paternal Grandfather     Bipolar disorder Sister     Suicide Attempts Sister     Mental illness Other     Colon cancer Other     Thyroid disease Maternal Grandmother     Colon polyps Maternal Grandmother     Bipolar disorder Paternal Aunt     Drug abuse Paternal Aunt     Eating disorder Paternal Aunt     Lung cancer Maternal Uncle     Substance Abuse Neg Hx        Review of Systems   Constitutional: Negative for chills, diaphoresis, fatigue and fever  Respiratory: Negative for apnea, cough, chest tightness, shortness of breath and wheezing  Cardiovascular: Negative for chest pain, palpitations and leg swelling  Gastrointestinal: Negative for abdominal distention, abdominal pain, anal bleeding, constipation, diarrhea, nausea, rectal pain and vomiting     Genitourinary: Negative for difficulty urinating, dyspareunia, dysuria, frequency, hematuria, menstrual problem, pelvic pain, urgency, vaginal bleeding, vaginal discharge and vaginal pain  Musculoskeletal: Negative for arthralgias, back pain and myalgias  Skin: Negative for color change and rash  Neurological: Negative for dizziness, syncope, light-headedness, numbness and headaches  Hematological: Negative for adenopathy  Does not bruise/bleed easily  Psychiatric/Behavioral: Negative for dysphoric mood and sleep disturbance  The patient is not nervous/anxious  Objective   Physical Exam    Objective      /82 (BP Location: Left arm, Patient Position: Sitting, Cuff Size: Large)   Temp 98 4 °F (36 9 °C)   Ht 5' 5 5" (1 664 m)   Wt 82 6 kg (182 lb 3 2 oz)   LMP 05/28/2020 (Exact Date)   BMI 29 86 kg/m²     General:   alert and oriented, in no acute distress   Neck: normal to inspection and palpation   Breast: normal appearance, no masses or tenderness   Heart: regular rate and rhythm, S1, S2 normal, no murmur, click, rub or gallop   Lungs:    Abdomen: soft, non-tender, without masses or organomegaly   Vulva: normal   Vagina: Without erythema or lesions or discharge  Normal   Cervix: Without lesions or discharge or cervicitis    No Cervical motion tenderness   Uterus: mid-position, non-tender, size consistent with Eight weeks   Adnexa: no mass, fullness, tenderness   Rectum: negative    Psych:  Normal mood and affect   Skin:  Without obvious lesions   Eyes: symmetric, with normal movements and reactivity   Musculoskeletal:  Normal muscle tone and movements appreciated

## 2020-07-28 NOTE — PROGRESS NOTES
OB INTAKE INTERVIEW / 1st Pregnancy  * Pt presents for OB intake  * Accompanied by: Mom  *  *Pt's LMP was 2020  *Ultrasound date:2020   8weeks 3days  *Estimated date of delivery: 3/4/2021   * confirmed by US    *Signs/Symptoms of Pregnancy   *Yes constipation    *Yes headaches   *No cramping/spotting    *No PICA cravings   *Diabetes  If any of the following answers are  yes, please order 1 hour GTT, 50g   *No BMI >35    *No first degree relative with type 2 diabetes    *No hx of PCOS   *No current metformin use     *No AMA with other risk factors   *Hypertension- If any of the following answers are yes, please order preeclampsia labs including 24 hour urine protein   *No Hx of chronic HTN    *Infection Screening   *No Does the pt have a hx of MRSA? *if yes- follow MRSA protocol and obtain a nasal swab for MRSA culture   *No history of herpes? *Immunizations:   *yes Discussed influenza vaccine      Due: 2020   *Yes Discussed Tdap vaccine    *Interview education   *Handouts given:    *Baby and Me support center     *[facility] sign up instructions    *Lab Locations    *[facility] List    *[facility]    *Pre-Birth Registration form completed    *Schedule for prenatal visits    *Schedule for ultrasounds    *Pregnancy Warning Signs reviewed    *Safe Medications During Pregnancy    *[facility]    *Advanced Pregnancy Guidelines    *Perineal Massage        *[facility]   *Yes discussed genetic testing- pt interested    *Yes appointment at [facility] made    *[facility]   *Yes Discussed with patient   *Yes Patient interested         Patient has been informed of basic prenatal advice such as avoiding alcohol, drugs, and smoking  She should remain hydrated and take daily prenatal vitamins   Patient should avoid caffeine, raw sprouts, high mercury fish, undercooked fish, raw eggs, organ meat, unwashed produce, and unpasteurized cheeses, milk, and fruit juice  She has been informed about toxoplasmosis and to avoid cat feces and undercooked meats  *I have these concerns about this prenatal patient: Patient has lost 27lb from LMP until today  She has been vomiting for weeks with recent RUQ pain  Results of abdominal ultrasound pending  *Details that I feel the provider should be aware of: This is patient's first pregnancy  She has registered for [facility] family [facility]  Patient referral has been placed to [facility] to manage her hyperemesis potentially with a Zofran pump and hydration as needed  PN1 visit scheduled  The patient was oriented to our practice and all questions were answered      Interviewed by:  Barrett Espinal RDMS

## 2020-07-29 ENCOUNTER — TELEPHONE (OUTPATIENT)
Dept: OBGYN CLINIC | Facility: CLINIC | Age: 20
End: 2020-07-29

## 2020-07-29 ENCOUNTER — OFFICE VISIT (OUTPATIENT)
Dept: GASTROENTEROLOGY | Facility: CLINIC | Age: 20
End: 2020-07-29
Payer: COMMERCIAL

## 2020-07-29 ENCOUNTER — TELEPHONE (OUTPATIENT)
Dept: GASTROENTEROLOGY | Facility: CLINIC | Age: 20
End: 2020-07-29

## 2020-07-29 VITALS
DIASTOLIC BLOOD PRESSURE: 74 MMHG | SYSTOLIC BLOOD PRESSURE: 112 MMHG | TEMPERATURE: 98.7 F | HEIGHT: 66 IN | WEIGHT: 182 LBS | BODY MASS INDEX: 29.25 KG/M2

## 2020-07-29 DIAGNOSIS — K21.00 GASTROESOPHAGEAL REFLUX DISEASE WITH ESOPHAGITIS: ICD-10-CM

## 2020-07-29 DIAGNOSIS — R11.10 HYPEREMESIS: Primary | ICD-10-CM

## 2020-07-29 PROCEDURE — 99214 OFFICE O/P EST MOD 30 MIN: CPT | Performed by: NURSE PRACTITIONER

## 2020-07-29 RX ORDER — SUCRALFATE 1 G/1
1 TABLET ORAL 2 TIMES DAILY
Qty: 180 TABLET | Refills: 3 | Status: SHIPPED | OUTPATIENT
Start: 2020-07-29 | End: 2021-06-17 | Stop reason: SDUPTHER

## 2020-07-29 RX ORDER — PANTOPRAZOLE SODIUM 40 MG/1
40 TABLET, DELAYED RELEASE ORAL 2 TIMES DAILY
Qty: 90 TABLET | Refills: 3 | Status: SHIPPED | OUTPATIENT
Start: 2020-07-29 | End: 2021-06-17 | Stop reason: SDUPTHER

## 2020-07-29 NOTE — PATIENT INSTRUCTIONS
PICC line by Interventional Radiology   D5NSS  to run at 125 ml/hr x1 L daily initially and depending upon progress may need to change to TPN   Consult visiting nurse   Increase Protonix to twice daily dosing   Continue Carafate twice daily   Increase MiraLax to twice daily dosing   Continue Zofran sublingual as needed until Zofran pump inserted on 07/30   Continue jel suppositories now

## 2020-07-29 NOTE — TELEPHONE ENCOUNTER
I spoke with patient informed her of update regarding IVF orders  Yojana Walker (Referral Nurse) from [facility] was going to reach out to Dr Noam Sutton office for orders  I explained that her homecare nurse griselda should be able to give her IV access tomorrow or Friday and at least get the IVF's started  Mean while I provided her the number to IR Scheduling for PICC Line placement as Jane Fontanez feels this will be more long term than 3-4 days given Michaela's 27 lb weight loss in 6 weeks, dehydration related to hyperemesis

## 2020-07-29 NOTE — TELEPHONE ENCOUNTER
I reached out to Nubia Abreu (Latricia Escobar current Home infusion nurse Keith Agrawal [phone], she works for [employer]  She advised we call [phone] this is their referral line  They can help to add on services  Nubia Abreu is planning on visiting patient tomorrow to help set up her zofran pump  She advised they could place a regular IV site and get the IVF's hooked up as soon as tomorrow  Pending on how she does, she may not need PICC Line Placed  Patient has access to 24 Telephone Support  Orders:   IV access and IVF's: D5 and 1/2 NS to run at 125 ml/hr     I spoke with Christina Silva RN, she verified with her manager that the order need to come from Dr Rashaad Munson office  She will reach out to them today  If patient does end up needing a PICC in the future [facility] requires post PICC Line placement, ECG TIP confirmation  TIP confirmation needs to be sent  In order for them to service the patients PICC  [facility]  will also confirm that patient is in an area to service a PICC Line

## 2020-07-29 NOTE — PROGRESS NOTES
[address] [facility] - Outpatient Consultation  Faustino Sos 21 y o  female MRN: [id number]  Encounter: [id number]    ASSESSMENT AND PLAN:      1  Hyperemesis Gravidarum   Intractable nausea and vomiting for the past six and a half weeks with an associated 27 lb weight loss in the setting of pregnancy  She is in her 1st trimester and is eight and a half weeks pregnant  She is scheduled to get a a Zofran implant tomorrow for continuous infusion of anti emetic medication  2  Dehydration in the setting of Hyperemesis Gravidarum   Two recent emergency room visits for intractable nausea and vomiting resulting in dehydration  She was given IV fluids and sent home as it was not advisable for her to be in the hospital during the COVID-19 pandemic  PICC line by Interventional Radiology   Will arrange for home visiting nurse to instruct patient on how to hang IV fluids   D5NSS  to run at 125 ml/hr x1 L daily initially and depending upon progress may need to change to TPN   Continue Zofran sublingual as needed until Zofran pump inserted on 07/30   Continue jel suppositories now,  As long as her obstetrician allows her to be on them    2  GERD   Longstanding GERD that has significantly worsened during pregnancy  She has undergone an upper endoscopy within the past several years that showed the absence of any significant findings  Increase Protonix to twice daily dosing  Continue Carafate twice daily  The patient was instructed that all medications need to be cleared by her obstetrician, Dr Ede Yee    3  Constipation    Acute on chronic constipation that has also worsened during pregnancy      Increase MiraLax to twice daily dosing  Continue jel suppositories for now   reinforced to the patient that she needs to have her obstetrician approve all of her medication    Followup Appointment:  4 weeks        ______________________________________________________________________    Chief Complaint   Patient presents with   [facility]  loss, severe abd  pain, vomiting, nausea       HPI:   Josias Faustin is a 21y o  year old female who presents  To the office accompanied by her father for acute on chronic constipation, progressive GERD as well as intractable nausea and vomiting  Intractable nausea vomiting began approximately 6 and half weeks ago  Associated epigastric distress  She is eight and a half weeks gestation  Only able to tolerate small amounts of liquids  Weight loss of 27 lb over this time frame  Has been to the emergency room twice over the past few weeks for dehydration secondary to the intractable nausea and vomiting  Blood work to include LFTs in the emergency room were within normal range as well as an abdominal ultrasound  She received 2 L of fluid and felt slightly improved at that time  Denies hematemesis, melena or rectal bleeding  Prior to her pregnancy she did gain approximately 30 lb as her weight a year ago was  187 lbs and current weight is 182 lbs  Denies dysphagia or odynophagia  She is a Sophomore at [employer]  She will not be returning for the fall semester as all of her classes are online    Historical Information   Past Medical History:   Diagnosis Date    Abnormal ECG     no diagnosis    Anemia     Anxiety     Asthma     Depression     Endometriosis     Frequent sinus infections     GERD (gastroesophageal reflux disease)     Migraine     Miscarriage     Ovarian cyst     Syncope     Trauma     Varicella     vaccine     Past Surgical History:   Procedure Laterality Date    TONSILLECTOMY  2006    UPPER GASTROINTESTINAL ENDOSCOPY  05/18/2017    Dysphagia, dilated to [address]   UPPER GASTROINTESTINAL ENDOSCOPY  10/07/2016    Dysphagia, dilated to [address]    Biopsies negative for eosinophilic esophagitis    UPPER GASTROINTESTINAL ENDOSCOPY  02/22/2018    Dysphagia, dilated to [address]  Biopsies negative for eosinophilic esophagitis    WISDOM TOOTH EXTRACTION       Social History     Substance and Sexual Activity   Alcohol Use Not Currently    Comment: social     Social History     Substance and Sexual Activity   Drug Use Not Currently    Types: Marijuana    Comment: last time a year ago      Social History     Tobacco Use   Smoking Status Never Smoker   Smokeless Tobacco Never Used     Family History   Problem Relation Age of Onset    Hypertension Mother     Arthritis Mother     Colon polyps Mother     Hyperlipidemia Father     Hypertension Maternal Grandfather     Stroke Maternal Grandfather     Myasthenia gravis Maternal Grandfather     Diabetes Paternal Grandfather     Stroke Paternal Grandfather     Cancer Paternal Grandfather     Bipolar disorder Sister     Suicide Attempts Sister     Mental illness Other     Colon cancer Other     Thyroid disease Maternal Grandmother     Colon polyps Maternal Grandmother     Bipolar disorder Paternal Aunt     Drug abuse Paternal Aunt     Eating disorder Paternal Aunt     Lung cancer Maternal Uncle     Substance Abuse Neg Hx        Meds/Allergies     Current Outpatient Medications:     albuterol (PROVENTIL HFA,VENTOLIN HFA) 90 mcg/act inhaler    desvenlafaxine succinate (PRISTIQ) 50 mg 24 hr tablet    ondansetron (ZOFRAN-ODT) 4 mg disintegrating tablet    pantoprazole (PROTONIX) 40 mg tablet    Prenatal Multivit-Min-Fe-FA (PRENATAL 1 + IRON PO)    sucralfate (CARAFATE) 1 g tablet    fluticasone (FLONASE) 50 mcg/act nasal spray    Allergies   Allergen Reactions    Strawberry C [Ascorbate] Anaphylaxis    Mold Extract [Trichophyton]     Cat Hair Extract        PHYSICAL EXAM:    Blood pressure 112/74, temperature 98 7 °F (37 1 °C), height 5' 5 5" (1 664 m), weight 82 6 kg (182 lb), last menstrual period 05/28/2020, not currently breastfeeding   Body mass index is 29 83 kg/m²  General Appearance: NAD, cooperative, alert  Eyes: Anicteric  ENT:  Normocephalic    Neck:  Appears normal   Resp:  Clear to auscultation bilaterally; no rales, rhonchi or wheezing; respirations unlabored   CV:  S1 S2, Regular rate and rhythm; no murmur, rub, or gallop  GI:  Soft, non-tender, non-distended; normal bowel sounds; no masses, no organomegaly   Rectal: Deferred  Musculoskeletal: No cyanosis, clubbing or edema  Normal ROM  Skin:  No jaundice, rashes, or lesions   Psych: Normal affect, good eye contact  Neuro: No gross deficits, AAOx3    Lab Results:   Lab Results   Component Value Date    WBC 8 73 07/26/2020    HGB 11 9 07/26/2020    HCT 37 6 07/26/2020    MCV 77 (L) 07/26/2020     07/26/2020     Lab Results   Component Value Date    K 3 7 07/26/2020    CL 98 (L) 07/26/2020    CO2 24 07/26/2020    BUN 9 07/26/2020    CREATININE 0 64 07/26/2020    CALCIUM 9 2 07/26/2020    AST 22 07/26/2020    ALT 35 07/26/2020    ALKPHOS 80 07/26/2020    EGFR 129 07/26/2020     No results found for: IRON, TIBC, FERRITIN  Lab Results   Component Value Date    LIPASE 104 07/26/2020       Radiology Results:   Us Abdomen Limited    Result Date: 7/28/2020  Narrative: RIGHT UPPER QUADRANT ULTRASOUND INDICATION:    R10 13: Epigastric pain for 5 days  COMPARISON:  None  TECHNIQUE:   Real-time ultrasound of the right upper quadrant was performed with a curvilinear transducer with both volumetric sweeps and still imaging techniques  FINDINGS: PANCREAS:  Visualized portions of the pancreas are within normal limits  AORTA AND IVC:  Visualized portions are normal for patient age  LIVER: Size:  Within normal range  The liver measures 12 0 cm in the midclavicular line  Contour:  Surface contour is smooth  Parenchyma:  Echogenicity and echotexture are within normal limits  No evidence of suspicious mass   Limited imaging of the main portal vein shows it to be patent and hepatopetal   BILIARY: No gallbladder findings  No intrahepatic biliary dilatation  CBD measures 3 mm  No choledocholithiasis  KIDNEY: Right kidney measures 11 7 x 5 2 cm  Within normal limits  ASCITES:   None  Impression: Normal  Workstation performed: [id number]         REVIEW OF SYSTEMS:    CONSTITUTIONAL: Denies any fever, chills, rigors, and weight loss  HEENT: No earache or tinnitus  Denies hearing loss or visual disturbances  CARDIOVASCULAR: No chest pain or palpitations  RESPIRATORY: Denies any cough, hemoptysis, shortness of breath or dyspnea on exertion  GASTROINTESTINAL: As noted in the History of Present Illness  GENITOURINARY: No problems with urination  Denies any hematuria or dysuria  NEUROLOGIC: No dizziness or vertigo, denies headaches  MUSCULOSKELETAL: Denies any muscle or joint pain  SKIN: Denies skin rashes or itching  ENDOCRINE: Denies excessive thirst  Denies intolerance to heat or cold  PSYCHOSOCIAL: Denies depression or anxiety  Denies any recent memory loss

## 2020-07-29 NOTE — TELEPHONE ENCOUNTER
Roxana Schultz spoke with  Allison Cohen at Dr Jeremias Lara office, they provided order for D5 1/2 NS

## 2020-07-30 ENCOUNTER — TELEPHONE (OUTPATIENT)
Dept: FAMILY MEDICINE CLINIC | Facility: CLINIC | Age: 20
End: 2020-07-30

## 2020-07-30 NOTE — TELEPHONE ENCOUNTER
----- Message from Ramses Martin MD sent at 7/29/2020 11:09 AM EDT -----  Please let patient know that her US abdomen was normal

## 2020-07-31 LAB
C TRACH DNA SPEC QL NAA+PROBE: NEGATIVE
N GONORRHOEA DNA SPEC QL NAA+PROBE: NEGATIVE

## 2020-08-01 ENCOUNTER — APPOINTMENT (INPATIENT)
Dept: ULTRASOUND IMAGING | Facility: HOSPITAL | Age: 20
DRG: 833 | End: 2020-08-01
Payer: COMMERCIAL

## 2020-08-01 ENCOUNTER — HOSPITAL ENCOUNTER (INPATIENT)
Facility: HOSPITAL | Age: 20
LOS: 2 days | Discharge: HOME HEALTH | DRG: 833 | End: 2020-08-03
Attending: EMERGENCY MEDICINE | Admitting: OBSTETRICS & GYNECOLOGY
Payer: COMMERCIAL

## 2020-08-01 ENCOUNTER — TELEPHONE (OUTPATIENT)
Dept: OTHER | Facility: OTHER | Age: 20
End: 2020-08-01

## 2020-08-01 DIAGNOSIS — Z34.91 FIRST TRIMESTER PREGNANCY: ICD-10-CM

## 2020-08-01 DIAGNOSIS — O21.0 HYPEREMESIS GRAVIDARUM: ICD-10-CM

## 2020-08-01 DIAGNOSIS — E86.9 VOLUME DEPLETION: ICD-10-CM

## 2020-08-01 DIAGNOSIS — Z3A.09 9 WEEKS GESTATION OF PREGNANCY: Primary | ICD-10-CM

## 2020-08-01 LAB
ALBUMIN SERPL BCP-MCNC: 3.3 G/DL (ref 3.5–5)
ALP SERPL-CCNC: 68 U/L (ref 46–116)
ALT SERPL W P-5'-P-CCNC: 106 U/L (ref 12–78)
ANION GAP SERPL CALCULATED.3IONS-SCNC: 10 MMOL/L (ref 4–13)
AST SERPL W P-5'-P-CCNC: 54 U/L (ref 5–45)
BASOPHILS # BLD AUTO: 0.02 THOUSANDS/ΜL (ref 0–0.1)
BASOPHILS NFR BLD AUTO: 0 % (ref 0–1)
BILIRUB SERPL-MCNC: 0.36 MG/DL (ref 0.2–1)
BILIRUB UR QL STRIP: NEGATIVE
BUN SERPL-MCNC: 4 MG/DL (ref 5–25)
CALCIUM SERPL-MCNC: 8.7 MG/DL (ref 8.3–10.1)
CHLORIDE SERPL-SCNC: 101 MMOL/L (ref 100–108)
CLARITY UR: ABNORMAL
CO2 SERPL-SCNC: 24 MMOL/L (ref 21–32)
COLOR UR: YELLOW
CREAT SERPL-MCNC: 0.53 MG/DL (ref 0.6–1.3)
EOSINOPHIL # BLD AUTO: 0.19 THOUSAND/ΜL (ref 0–0.61)
EOSINOPHIL NFR BLD AUTO: 3 % (ref 0–6)
ERYTHROCYTE [DISTWIDTH] IN BLOOD BY AUTOMATED COUNT: 15.2 % (ref 11.6–15.1)
GFR SERPL CREATININE-BSD FRML MDRD: 137 ML/MIN/1.73SQ M
GLUCOSE SERPL-MCNC: 84 MG/DL (ref 65–140)
GLUCOSE UR STRIP-MCNC: ABNORMAL MG/DL
HCT VFR BLD AUTO: 34.5 % (ref 34.8–46.1)
HGB BLD-MCNC: 10.7 G/DL (ref 11.5–15.4)
HGB UR QL STRIP.AUTO: NEGATIVE
IMM GRANULOCYTES # BLD AUTO: 0.04 THOUSAND/UL (ref 0–0.2)
IMM GRANULOCYTES NFR BLD AUTO: 1 % (ref 0–2)
KETONES UR STRIP-MCNC: ABNORMAL MG/DL
LEUKOCYTE ESTERASE UR QL STRIP: NEGATIVE
LIPASE SERPL-CCNC: 116 U/L (ref 73–393)
LYMPHOCYTES # BLD AUTO: 1.07 THOUSANDS/ΜL (ref 0.6–4.47)
LYMPHOCYTES NFR BLD AUTO: 15 % (ref 14–44)
MCH RBC QN AUTO: 24.7 PG (ref 26.8–34.3)
MCHC RBC AUTO-ENTMCNC: 31 G/DL (ref 31.4–37.4)
MCV RBC AUTO: 80 FL (ref 82–98)
MONOCYTES # BLD AUTO: 0.57 THOUSAND/ΜL (ref 0.17–1.22)
MONOCYTES NFR BLD AUTO: 8 % (ref 4–12)
NEUTROPHILS # BLD AUTO: 5.41 THOUSANDS/ΜL (ref 1.85–7.62)
NEUTS SEG NFR BLD AUTO: 73 % (ref 43–75)
NITRITE UR QL STRIP: NEGATIVE
NRBC BLD AUTO-RTO: 0 /100 WBCS
PH UR STRIP.AUTO: 7 [PH] (ref 4.5–8)
PLATELET # BLD AUTO: 212 THOUSANDS/UL (ref 149–390)
PMV BLD AUTO: 9.8 FL (ref 8.9–12.7)
POTASSIUM SERPL-SCNC: 3.6 MMOL/L (ref 3.5–5.3)
PROT SERPL-MCNC: 6.8 G/DL (ref 6.4–8.2)
PROT UR STRIP-MCNC: NEGATIVE MG/DL
RBC # BLD AUTO: 4.34 MILLION/UL (ref 3.81–5.12)
SODIUM SERPL-SCNC: 135 MMOL/L (ref 136–145)
SP GR UR STRIP.AUTO: 1.02 (ref 1–1.03)
UROBILINOGEN UR QL STRIP.AUTO: 1 E.U./DL
WBC # BLD AUTO: 7.3 THOUSAND/UL (ref 4.31–10.16)

## 2020-08-01 PROCEDURE — 85025 COMPLETE CBC W/AUTO DIFF WBC: CPT | Performed by: EMERGENCY MEDICINE

## 2020-08-01 PROCEDURE — C9113 INJ PANTOPRAZOLE SODIUM, VIA: HCPCS | Performed by: OBSTETRICS & GYNECOLOGY

## 2020-08-01 PROCEDURE — 36415 COLL VENOUS BLD VENIPUNCTURE: CPT | Performed by: EMERGENCY MEDICINE

## 2020-08-01 PROCEDURE — 99285 EMERGENCY DEPT VISIT HI MDM: CPT | Performed by: EMERGENCY MEDICINE

## 2020-08-01 PROCEDURE — 83690 ASSAY OF LIPASE: CPT | Performed by: EMERGENCY MEDICINE

## 2020-08-01 PROCEDURE — 99285 EMERGENCY DEPT VISIT HI MDM: CPT

## 2020-08-01 PROCEDURE — 96365 THER/PROPH/DIAG IV INF INIT: CPT

## 2020-08-01 PROCEDURE — 96366 THER/PROPH/DIAG IV INF ADDON: CPT

## 2020-08-01 PROCEDURE — NC001 PR NO CHARGE: Performed by: OBSTETRICS & GYNECOLOGY

## 2020-08-01 PROCEDURE — 81003 URINALYSIS AUTO W/O SCOPE: CPT

## 2020-08-01 PROCEDURE — 87086 URINE CULTURE/COLONY COUNT: CPT

## 2020-08-01 PROCEDURE — 80053 COMPREHEN METABOLIC PANEL: CPT | Performed by: EMERGENCY MEDICINE

## 2020-08-01 RX ORDER — DESVENLAFAXINE 50 MG/1
100 TABLET, EXTENDED RELEASE ORAL DAILY
Status: DISCONTINUED | OUTPATIENT
Start: 2020-08-01 | End: 2020-08-03 | Stop reason: HOSPADM

## 2020-08-01 RX ORDER — DESVENLAFAXINE 50 MG/1
100 TABLET, EXTENDED RELEASE ORAL DAILY
Status: DISCONTINUED | OUTPATIENT
Start: 2020-08-02 | End: 2020-08-01

## 2020-08-01 RX ORDER — CALCIUM CARBONATE 200(500)MG
500 TABLET,CHEWABLE ORAL DAILY PRN
Status: DISCONTINUED | OUTPATIENT
Start: 2020-08-01 | End: 2020-08-02

## 2020-08-01 RX ORDER — METOCLOPRAMIDE HYDROCHLORIDE 5 MG/ML
10 INJECTION INTRAMUSCULAR; INTRAVENOUS EVERY 6 HOURS PRN
Status: DISCONTINUED | OUTPATIENT
Start: 2020-08-01 | End: 2020-08-01

## 2020-08-01 RX ORDER — PROMETHAZINE HYDROCHLORIDE 12.5 MG/1
12.5 SUPPOSITORY RECTAL EVERY 6 HOURS PRN
Status: DISCONTINUED | OUTPATIENT
Start: 2020-08-01 | End: 2020-08-03 | Stop reason: HOSPADM

## 2020-08-01 RX ORDER — ONDANSETRON 2 MG/ML
4 INJECTION INTRAMUSCULAR; INTRAVENOUS EVERY 6 HOURS PRN
Status: DISCONTINUED | OUTPATIENT
Start: 2020-08-01 | End: 2020-08-03 | Stop reason: HOSPADM

## 2020-08-01 RX ORDER — PROMETHAZINE HYDROCHLORIDE 6.25 MG/5ML
12.5 SYRUP ORAL EVERY 6 HOURS PRN
Status: DISCONTINUED | OUTPATIENT
Start: 2020-08-01 | End: 2020-08-01

## 2020-08-01 RX ORDER — SUCRALFATE 1 G/1
1 TABLET ORAL 2 TIMES DAILY
Status: DISCONTINUED | OUTPATIENT
Start: 2020-08-01 | End: 2020-08-03 | Stop reason: HOSPADM

## 2020-08-01 RX ORDER — DEXTROSE, SODIUM CHLORIDE, AND POTASSIUM CHLORIDE 5; .45; .15 G/100ML; G/100ML; G/100ML
125 INJECTION INTRAVENOUS CONTINUOUS
Status: DISCONTINUED | OUTPATIENT
Start: 2020-08-01 | End: 2020-08-03 | Stop reason: HOSPADM

## 2020-08-01 RX ORDER — MAGNESIUM HYDROXIDE/ALUMINUM HYDROXICE/SIMETHICONE 120; 1200; 1200 MG/30ML; MG/30ML; MG/30ML
15 SUSPENSION ORAL EVERY 6 HOURS PRN
Status: DISCONTINUED | OUTPATIENT
Start: 2020-08-01 | End: 2020-08-03 | Stop reason: HOSPADM

## 2020-08-01 RX ORDER — ACETAMINOPHEN 325 MG/1
650 TABLET ORAL EVERY 6 HOURS PRN
Status: DISCONTINUED | OUTPATIENT
Start: 2020-08-01 | End: 2020-08-03 | Stop reason: HOSPADM

## 2020-08-01 RX ORDER — PANTOPRAZOLE SODIUM 40 MG/1
40 TABLET, DELAYED RELEASE ORAL
Status: DISCONTINUED | OUTPATIENT
Start: 2020-08-02 | End: 2020-08-01

## 2020-08-01 RX ORDER — PANTOPRAZOLE SODIUM 40 MG/1
40 INJECTION, POWDER, FOR SOLUTION INTRAVENOUS
Status: DISCONTINUED | OUTPATIENT
Start: 2020-08-01 | End: 2020-08-03 | Stop reason: HOSPADM

## 2020-08-01 RX ORDER — DOCUSATE SODIUM 100 MG/1
100 CAPSULE, LIQUID FILLED ORAL 2 TIMES DAILY
Status: DISCONTINUED | OUTPATIENT
Start: 2020-08-01 | End: 2020-08-03 | Stop reason: HOSPADM

## 2020-08-01 RX ORDER — METOCLOPRAMIDE HYDROCHLORIDE 5 MG/ML
10 INJECTION INTRAMUSCULAR; INTRAVENOUS EVERY 6 HOURS SCHEDULED
Status: DISCONTINUED | OUTPATIENT
Start: 2020-08-01 | End: 2020-08-03 | Stop reason: HOSPADM

## 2020-08-01 RX ADMIN — IRON SUCROSE 200 MG: 20 INJECTION, SOLUTION INTRAVENOUS at 17:39

## 2020-08-01 RX ADMIN — DEXTROSE, SODIUM CHLORIDE, SODIUM LACTATE, POTASSIUM CHLORIDE, AND CALCIUM CHLORIDE 1000 ML: 5; .6; .31; .03; .02 INJECTION, SOLUTION INTRAVENOUS at 16:29

## 2020-08-01 RX ADMIN — DEXTROSE, SODIUM CHLORIDE, AND POTASSIUM CHLORIDE 125 ML/HR: 5; .45; .15 INJECTION INTRAVENOUS at 19:34

## 2020-08-01 RX ADMIN — METOCLOPRAMIDE 10 MG: 5 INJECTION, SOLUTION INTRAMUSCULAR; INTRAVENOUS at 19:47

## 2020-08-01 RX ADMIN — DOCUSATE SODIUM 100 MG: 100 CAPSULE, LIQUID FILLED ORAL at 17:48

## 2020-08-01 RX ADMIN — PANTOPRAZOLE SODIUM 40 MG: 40 INJECTION, POWDER, FOR SOLUTION INTRAVENOUS at 20:17

## 2020-08-01 RX ADMIN — SUCRALFATE 1 G: 1 TABLET ORAL at 23:03

## 2020-08-01 RX ADMIN — DEXTROSE, SODIUM CHLORIDE, SODIUM LACTATE, POTASSIUM CHLORIDE, AND CALCIUM CHLORIDE 1000 ML: 5; .6; .31; .03; .02 INJECTION, SOLUTION INTRAVENOUS at 14:23

## 2020-08-01 RX ADMIN — ONDANSETRON 4 MG: 2 INJECTION INTRAMUSCULAR; INTRAVENOUS at 23:04

## 2020-08-01 NOTE — CONSULTS
Consultation - Gynecology  Rose Shelton 21 y o  female MRN: [id number]  Unit/Bed#: ED 28 Encounter: [id number]      Inpatient consult to Obstetrics / Gynecology  Consult performed by: Gema Bateman MD  Consult ordered by: Storm Mendieta MD        Chief Complaint   Patient presents with    Abdominal Pain     Pt  reports upper abdominal pain  Pt  reports N/V/ Constipation  Pt  has not had a bowel movement in a week  History of Present Illness   Physician Requesting Consult: Storm Mendieta MD  Reason for Consult / Principal Problem:  Hyperemesis gravidarum  Subspeciality: OBGYN  HPI: Rose Shelton is a 21 y o  female who presents with nausea and vomiting to the ER   Patient with history of hyperemesis gravidarum, currently on Zofran pump and IV infusions of dextrose 3 L per day  Patient denies vaginal bleeding, leakage of fluid, or abdominal cramping  Today patient states significant amount of nausea with 3 episodes of vomit since last night  She have not been able to keep food down for the last 2 weeks  In addition patient states she has been constipated for the last week  This pregnancy is complicated by weight lost a total of 30 lb since pregnancy  Patient with past medical history of GERD on Protonix and Carafate, history of depression and posttraumatic stress disorder on Pristiq  Initially ER evaluation with  afebrile patient, blood pressure is 100 to 110s over 50s to 60s, heart rate 80s to 90s, respiratory rate 16 to 80s saturations at room air 99%,  normal electrolytes, CBC without evidence of leukocytosis, with mild anemia with hemoglobin of 10 7, plats 212  Lipase is negative, but with mild elevation on LFTs, AST 54,   She denies Ob symptoms  Bedside ultrasound with evidence of intrauterine pregnancy with positive fetal heart cardiac activity      Review of Systems   Constitutional: Negative  HENT: Negative  Eyes: Negative  Respiratory: Negative  Cardiovascular: Negative  Gastrointestinal: Positive for nausea and vomiting  Endocrine: Negative  Genitourinary: Negative  Musculoskeletal: Negative  Allergic/Immunologic: Negative  Neurological: Negative  Hematological: Negative  Psychiatric/Behavioral: Negative  Historical Information   Past Medical History:   Diagnosis Date    Abnormal ECG     no diagnosis    Anemia     Anxiety     Asthma     Depression     Endometriosis     Frequent sinus infections     GERD (gastroesophageal reflux disease)     Migraine     Miscarriage     Ovarian cyst     Syncope     Trauma     Varicella     vaccine     Past Surgical History:   Procedure Laterality Date    TONSILLECTOMY  2006    UPPER GASTROINTESTINAL ENDOSCOPY  2017    Dysphagia, dilated to [facility]   UPPER GASTROINTESTINAL ENDOSCOPY  10/07/2016    Dysphagia, dilated to 47 [facility]  Biopsies negative for eosinophilic esophagitis    UPPER GASTROINTESTINAL ENDOSCOPY  2018    Dysphagia, dilated to [facility]    Biopsies negative for eosinophilic esophagitis    WISDOM TOOTH EXTRACTION       OB History    Para Term  AB Living   2 0 0 0 1 0   SAB TAB Ectopic Multiple Live Births   1 0 0 0 0      # Outcome Date GA Lbr Lino/2nd Weight Sex Delivery Anes PTL Lv   2 Current            1 SAB              Family History   Problem Relation Age of Onset    Hypertension Mother    Nate Daubs Arthritis Mother     Colon polyps Mother     Hyperlipidemia Father     Hypertension Maternal Grandfather     Stroke Maternal Grandfather     Myasthenia gravis Maternal Grandfather     Diabetes Paternal Grandfather     Stroke Paternal Grandfather     Cancer Paternal Grandfather     Bipolar disorder Sister     Suicide Attempts Sister     Mental illness Other     Colon cancer Other     Thyroid disease Maternal Grandmother     Colon polyps Maternal Grandmother     Bipolar disorder Paternal Aunt     Drug abuse Paternal [de-identified]  Eating disorder Paternal Aunt     Lung cancer Maternal Uncle     Substance Abuse Neg Hx      Social History   Social History     Substance and Sexual Activity   Alcohol Use Not Currently    Comment: social     Social History     Substance and Sexual Activity   Drug Use Not Currently    Types: Marijuana    Comment: last time a year ago      Social History     Tobacco Use   Smoking Status Never Smoker   Smokeless Tobacco Never Used       Meds/Allergies   No current facility-administered medications for this encounter  Allergies   Allergen Reactions    Strawberry C [Ascorbate] Anaphylaxis    Mold Extract [Trichophyton]     Cat Hair Extract        Objective   Vitals: Blood pressure 117/59, pulse 97, temperature 98 5 °F (36 9 °C), temperature source Temporal, resp  rate 18, last menstrual period 05/28/2020, SpO2 99 %, not currently breastfeeding  There is no height or weight on file to calculate BMI  No intake or output data in the 24 hours ending 08/01/20 1514    Invasive Devices     Peripheral Intravenous Line            Peripheral IV 08/01/20 Left Antecubital less than 1 day                Physical Exam   Constitutional: She is oriented to person, place, and time  She appears well-developed and well-nourished  HENT:   Head: Normocephalic and atraumatic  Eyes: Pupils are equal, round, and reactive to light  Conjunctivae are normal    Neck: Normal range of motion  Neck supple  Cardiovascular: Normal rate, regular rhythm, normal heart sounds and intact distal pulses  Pulmonary/Chest: Effort normal and breath sounds normal    Abdominal: Soft  Bowel sounds are normal    Genitourinary: Vagina normal and uterus normal    Musculoskeletal: Normal range of motion  Neurological: She is alert and oriented to person, place, and time  Skin: Skin is warm  Psychiatric: She has a normal mood and affect       Bedside ultrasound:  Evidence of intrauterine pregnancy with positive fetal cardiac activity, fetal heart rate of 174    Lab Results:   Recent Results (from the past 24 hour(s))   Comprehensive metabolic panel    Collection Time: 08/01/20  2:18 PM   Result Value Ref Range    Sodium 135 (L) 136 - 145 mmol/L    Potassium 3 6 3 5 - 5 3 mmol/L    Chloride 101 100 - 108 mmol/L    CO2 24 21 - 32 mmol/L    ANION GAP 10 4 - 13 mmol/L    BUN 4 (L) 5 - 25 mg/dL    Creatinine 0 53 (L) 0 60 - 1 30 mg/dL    Glucose 84 65 - 140 mg/dL    Calcium 8 7 8 3 - 10 1 mg/dL    AST 54 (H) 5 - 45 U/L     (H) 12 - 78 U/L    Alkaline Phosphatase 68 46 - 116 U/L    Total Protein 6 8 6 4 - 8 2 g/dL    Albumin 3 3 (L) 3 5 - 5 0 g/dL    Total Bilirubin 0 36 0 20 - 1 00 mg/dL    eGFR 137 ml/min/1 73sq m   CBC and differential    Collection Time: 08/01/20  2:18 PM   Result Value Ref Range    WBC 7 30 4 31 - 10 16 Thousand/uL    RBC 4 34 3 81 - 5 12 Million/uL    Hemoglobin 10 7 (L) 11 5 - 15 4 g/dL    Hematocrit 34 5 (L) 34 8 - 46 1 %    MCV 80 (L) 82 - 98 fL    MCH 24 7 (L) 26 8 - 34 3 pg    MCHC 31 0 (L) 31 4 - 37 4 g/dL    RDW 15 2 (H) 11 6 - 15 1 %    MPV 9 8 8 9 - 12 7 fL    Platelets 878 349 - 227 Thousands/uL    nRBC 0 /100 WBCs    Neutrophils Relative 73 43 - 75 %    Immat GRANS % 1 0 - 2 %    Lymphocytes Relative 15 14 - 44 %    Monocytes Relative 8 4 - 12 %    Eosinophils Relative 3 0 - 6 %    Basophils Relative 0 0 - 1 %    Neutrophils Absolute 5 41 1 85 - 7 62 Thousands/µL    Immature Grans Absolute 0 04 0 00 - 0 20 Thousand/uL    Lymphocytes Absolute 1 07 0 60 - 4 47 Thousands/µL    Monocytes Absolute 0 57 0 17 - 1 22 Thousand/µL    Eosinophils Absolute 0 19 0 00 - 0 61 Thousand/µL    Basophils Absolute 0 02 0 00 - 0 10 Thousands/µL   Lipase    Collection Time: 08/01/20  2:18 PM   Result Value Ref Range    Lipase 116 73 - 393 u/L       Assessment/Plan     Assessment/plan     Hyperemesis gravidarum  - Admission for OBGYN service  - Currently on Zofran pump--> Continue  - Reglan has been order  - Liquid diet  - Will re-evaluate PO tolerance     Elevation of LFTs  - upper abdominal ultrasound have been order  - AST ALT with mild elevation  - lipase is negative  - Without acute peritoneal signs, negative Corrinne Monte intrauterine pregnancy at 9 weeks  -Positive fetal heart movements on bedside ultrasound  -Currently without obstetric complaints    Anemia 1st trimester  - Venofer have been order  - last hemoglobin 10 7    Code Status: full     Counseling / Coordination of Care  Total floor / unit time spent today20 minutes  minutes  Greater than 50% of total time was spent with the patient and / or family counseling and / or coordination of care   A description of the counseling / coordination of care: Amairani Hooker MD  8/1/2020  3:14 PM

## 2020-08-01 NOTE — PLAN OF CARE
Problem: Nutrition/Hydration-ADULT  Goal: Nutrient/Hydration intake appropriate for improving, restoring or maintaining nutritional needs  Description  Monitor and assess patient's nutrition/hydration status for malnutrition  Collaborate with interdisciplinary team and initiate plan and interventions as ordered  Monitor patient's weight and dietary intake as ordered or per policy  Utilize nutrition screening tool and intervene as necessary  Determine patient's food preferences and provide high-protein, high-caloric foods as appropriate       INTERVENTIONS:  - Monitor oral intake, urinary output, labs, and treatment plans  - Assess nutrition and hydration status and recommend course of action  - Evaluate amount of meals eaten  - Assist patient with eating if necessary   - Allow adequate time for meals  - Recommend/ encourage appropriate diets, oral nutritional supplements, and vitamin/mineral supplements  - Order, calculate, and assess calorie counts as needed  - Recommend, monitor, and adjust tube feedings and TPN/PPN based on assessed needs  - Assess need for intravenous fluids  - Provide specific nutrition/hydration education as appropriate  - Include patient/family/caregiver in decisions related to nutrition  Outcome: Progressing     Problem: PAIN - ADULT  Goal: Verbalizes/displays adequate comfort level or baseline comfort level  Description  Interventions:  - Encourage patient to monitor pain and request assistance  - Assess pain using appropriate pain scale  - Administer analgesics based on type and severity of pain and evaluate response  - Implement non-pharmacological measures as appropriate and evaluate response  - Consider cultural and social influences on pain and pain management  - Notify physician/advanced practitioner if interventions unsuccessful or patient reports new pain  Outcome: Progressing     Problem: SAFETY ADULT  Goal: Patient will remain free of falls  Description  INTERVENTIONS:  - Assess patient frequently for physical needs  -  Identify cognitive and physical deficits and behaviors that affect risk of falls    -  [place] fall precautions as indicated by assessment   - Educate patient/family on patient safety including physical limitations  - Instruct patient to call for assistance with activity based on assessment  - Modify environment to reduce risk of injury  - Consider OT/PT consult to assist with strengthening/mobility  Outcome: Progressing  Goal: Maintain or return to baseline ADL function  Description  INTERVENTIONS:  -  Assess patient's ability to carry out ADLs; assess patient's baseline for ADL function and identify physical deficits which impact ability to perform ADLs (bathing, care of mouth/teeth, toileting, grooming, dressing, etc )  - Assess/evaluate cause of self-care deficits   - Assess range of motion  - Assess patient's mobility; develop plan if impaired  - Assess patient's need for assistive devices and provide as appropriate  - Encourage maximum independence but intervene and supervise when necessary  - Involve family in performance of ADLs  - Assess for home care needs following discharge   - Consider OT consult to assist with ADL evaluation and planning for discharge  - Provide patient education as appropriate  Outcome: Progressing  Goal: Maintain or return mobility status to optimal level  Description  INTERVENTIONS:  - Assess patient's baseline mobility status (ambulation, transfers, stairs, etc )    - Identify cognitive and physical deficits and behaviors that affect mobility  - Identify mobility aids required to assist with transfers and/or ambulation (gait belt, sit-to-stand, lift, walker, cane, etc )  - [place] fall precautions as indicated by assessment  - Record patient progress and toleration of activity level on Mobility SBAR; progress patient to next Phase/Stage  - Instruct patient to call for assistance with activity based on assessment  - Consider rehabilitation consult to assist with strengthening/weightbearing, etc   Outcome: Progressing     Problem: DISCHARGE PLANNING  Goal: Discharge to home or other facility with appropriate resources  Description  INTERVENTIONS:  - Identify barriers to discharge w/patient and caregiver  - Arrange for needed discharge resources and transportation as appropriate  - Identify discharge learning needs (meds, wound care, etc )  - Arrange for interpretive services to assist at discharge as needed  - Refer to Case Management Department for coordinating discharge planning if the patient needs post-hospital services based on physician/advanced practitioner order or complex needs related to functional status, cognitive ability, or social support system  Outcome: Progressing     Problem: Knowledge Deficit  Goal: Patient/family/caregiver demonstrates understanding of disease process, treatment plan, medications, and discharge instructions  Description  Complete learning assessment and assess knowledge base    Interventions:  - Provide teaching at level of understanding  - Provide teaching via preferred learning methods  Outcome: Progressing

## 2020-08-01 NOTE — TELEPHONE ENCOUNTER
[facility]:    [phone] / Teddy Cerda / Earl Drown / [phone] / Pt is unable to eat or drink anything without vomiting  Pt is shaking, weak and has cold sweats  Pt has moderate Ketones and a fever of 99  IV fluids and meds are still running and pt is still doing poorly with meds   Please call Effie Aldrich, Nurse per her request

## 2020-08-02 ENCOUNTER — DOCUMENTATION (OUTPATIENT)
Dept: OBGYN CLINIC | Facility: CLINIC | Age: 20
End: 2020-08-02

## 2020-08-02 ENCOUNTER — APPOINTMENT (INPATIENT)
Dept: ULTRASOUND IMAGING | Facility: HOSPITAL | Age: 20
DRG: 833 | End: 2020-08-02
Payer: COMMERCIAL

## 2020-08-02 LAB
BACTERIA UR CULT: NORMAL
HAV IGM SER QL: NORMAL
HBV CORE IGM SER QL: NORMAL
HBV SURFACE AG SER QL: NORMAL
HCV AB SER QL: NORMAL
T4 FREE SERPL-MCNC: 1.69 NG/DL (ref 0.78–1.33)
TSH SERPL DL<=0.05 MIU/L-ACNC: 0.03 UIU/ML (ref 0.46–3.98)

## 2020-08-02 PROCEDURE — 84443 ASSAY THYROID STIM HORMONE: CPT | Performed by: OBSTETRICS & GYNECOLOGY

## 2020-08-02 PROCEDURE — C9113 INJ PANTOPRAZOLE SODIUM, VIA: HCPCS | Performed by: OBSTETRICS & GYNECOLOGY

## 2020-08-02 PROCEDURE — 99231 SBSQ HOSP IP/OBS SF/LOW 25: CPT | Performed by: OBSTETRICS & GYNECOLOGY

## 2020-08-02 PROCEDURE — 76705 ECHO EXAM OF ABDOMEN: CPT

## 2020-08-02 PROCEDURE — 80074 ACUTE HEPATITIS PANEL: CPT | Performed by: OBSTETRICS & GYNECOLOGY

## 2020-08-02 PROCEDURE — 84439 ASSAY OF FREE THYROXINE: CPT | Performed by: OBSTETRICS & GYNECOLOGY

## 2020-08-02 RX ORDER — CALCIUM CARBONATE 200(500)MG
1000 TABLET,CHEWABLE ORAL 2 TIMES DAILY PRN
Status: DISCONTINUED | OUTPATIENT
Start: 2020-08-02 | End: 2020-08-03 | Stop reason: HOSPADM

## 2020-08-02 RX ORDER — PYRIDOXINE HCL (VITAMIN B6) 50 MG
25 TABLET ORAL 3 TIMES DAILY
Status: DISCONTINUED | OUTPATIENT
Start: 2020-08-02 | End: 2020-08-03 | Stop reason: HOSPADM

## 2020-08-02 RX ADMIN — DEXTROSE, SODIUM CHLORIDE, AND POTASSIUM CHLORIDE 125 ML/HR: 5; .45; .15 INJECTION INTRAVENOUS at 09:35

## 2020-08-02 RX ADMIN — DOXYLAMINE SUCCINATE 25 MG: 25 TABLET ORAL at 22:04

## 2020-08-02 RX ADMIN — DOCUSATE SODIUM 100 MG: 100 CAPSULE, LIQUID FILLED ORAL at 17:45

## 2020-08-02 RX ADMIN — Medication 25 MG: at 17:48

## 2020-08-02 RX ADMIN — METOCLOPRAMIDE 10 MG: 5 INJECTION, SOLUTION INTRAMUSCULAR; INTRAVENOUS at 05:24

## 2020-08-02 RX ADMIN — ACETAMINOPHEN 650 MG: 325 TABLET ORAL at 00:20

## 2020-08-02 RX ADMIN — SUCRALFATE 1 G: 1 TABLET ORAL at 09:34

## 2020-08-02 RX ADMIN — SUCRALFATE 1 G: 1 TABLET ORAL at 17:45

## 2020-08-02 RX ADMIN — ONDANSETRON 4 MG: 2 INJECTION INTRAMUSCULAR; INTRAVENOUS at 09:34

## 2020-08-02 RX ADMIN — ACETAMINOPHEN 650 MG: 325 TABLET ORAL at 20:42

## 2020-08-02 RX ADMIN — Medication 25 MG: at 22:03

## 2020-08-02 RX ADMIN — DEXTROSE, SODIUM CHLORIDE, AND POTASSIUM CHLORIDE 125 ML/HR: 5; .45; .15 INJECTION INTRAVENOUS at 20:43

## 2020-08-02 RX ADMIN — DESVENLAFAXINE 100 MG: 50 TABLET, FILM COATED, EXTENDED RELEASE ORAL at 00:20

## 2020-08-02 RX ADMIN — METOCLOPRAMIDE 10 MG: 5 INJECTION, SOLUTION INTRAMUSCULAR; INTRAVENOUS at 00:20

## 2020-08-02 RX ADMIN — DESVENLAFAXINE 100 MG: 50 TABLET, FILM COATED, EXTENDED RELEASE ORAL at 22:03

## 2020-08-02 RX ADMIN — PANTOPRAZOLE SODIUM 40 MG: 40 INJECTION, POWDER, FOR SOLUTION INTRAVENOUS at 09:34

## 2020-08-02 RX ADMIN — METOCLOPRAMIDE 10 MG: 5 INJECTION, SOLUTION INTRAMUSCULAR; INTRAVENOUS at 23:42

## 2020-08-02 RX ADMIN — METOCLOPRAMIDE 10 MG: 5 INJECTION, SOLUTION INTRAMUSCULAR; INTRAVENOUS at 12:55

## 2020-08-02 RX ADMIN — DOCUSATE SODIUM 100 MG: 100 CAPSULE, LIQUID FILLED ORAL at 09:34

## 2020-08-02 RX ADMIN — METOCLOPRAMIDE 10 MG: 5 INJECTION, SOLUTION INTRAMUSCULAR; INTRAVENOUS at 17:45

## 2020-08-02 RX ADMIN — ONDANSETRON 4 MG: 2 INJECTION INTRAMUSCULAR; INTRAVENOUS at 15:49

## 2020-08-02 NOTE — PLAN OF CARE
Problem: Nutrition/Hydration-ADULT  Goal: Nutrient/Hydration intake appropriate for improving, restoring or maintaining nutritional needs  Description: Monitor and assess patient's nutrition/hydration status for malnutrition  Collaborate with interdisciplinary team and initiate plan and interventions as ordered  Monitor patient's weight and dietary intake as ordered or per policy  Utilize nutrition screening tool and intervene as necessary  Determine patient's food preferences and provide high-protein, high-caloric foods as appropriate       INTERVENTIONS:  - Monitor oral intake, urinary output, labs, and treatment plans  - Assess nutrition and hydration status and recommend course of action  - Evaluate amount of meals eaten  - Assist patient with eating if necessary   - Allow adequate time for meals  - Recommend/ encourage appropriate diets, oral nutritional supplements, and vitamin/mineral supplements  - Order, calculate, and assess calorie counts as needed  - Recommend, monitor, and adjust tube feedings and TPN/PPN based on assessed needs  - Assess need for intravenous fluids  - Provide specific nutrition/hydration education as appropriate  - Include patient/family/caregiver in decisions related to nutrition  Outcome: Progressing     Problem: PAIN - ADULT  Goal: Verbalizes/displays adequate comfort level or baseline comfort level  Description: Interventions:  - Encourage patient to monitor pain and request assistance  - Assess pain using appropriate pain scale  - Administer analgesics based on type and severity of pain and evaluate response  - Implement non-pharmacological measures as appropriate and evaluate response  - Consider cultural and social influences on pain and pain management  - Notify physician/advanced practitioner if interventions unsuccessful or patient reports new pain  Outcome: Progressing     Problem: SAFETY ADULT  Goal: Patient will remain free of falls  Description: INTERVENTIONS:  - Assess patient frequently for physical needs  -  Identify cognitive and physical deficits and behaviors that affect risk of falls    -  [place] fall precautions as indicated by assessment   - Educate patient/family on patient safety including physical limitations  - Instruct patient to call for assistance with activity based on assessment  - Modify environment to reduce risk of injury  - Consider OT/PT consult to assist with strengthening/mobility  Outcome: Progressing  Goal: Maintain or return to baseline ADL function  Description: INTERVENTIONS:  -  Assess patient's ability to carry out ADLs; assess patient's baseline for ADL function and identify physical deficits which impact ability to perform ADLs (bathing, care of mouth/teeth, toileting, grooming, dressing, etc )  - Assess/evaluate cause of self-care deficits   - Assess range of motion  - Assess patient's mobility; develop plan if impaired  - Assess patient's need for assistive devices and provide as appropriate  - Encourage maximum independence but intervene and supervise when necessary  - Involve family in performance of ADLs  - Assess for home care needs following discharge   - Consider OT consult to assist with ADL evaluation and planning for discharge  - Provide patient education as appropriate  Outcome: Progressing  Goal: Maintain or return mobility status to optimal level  Description: INTERVENTIONS:  - Assess patient's baseline mobility status (ambulation, transfers, stairs, etc )    - Identify cognitive and physical deficits and behaviors that affect mobility  - Identify mobility aids required to assist with transfers and/or ambulation (gait belt, sit-to-stand, lift, walker, cane, etc )  - [place] fall precautions as indicated by assessment  - Record patient progress and toleration of activity level on Mobility SBAR; progress patient to next Phase/Stage  - Instruct patient to call for assistance with activity based on assessment  - Consider rehabilitation consult to assist with strengthening/weightbearing, etc   Outcome: Progressing     Problem: DISCHARGE PLANNING  Goal: Discharge to home or other facility with appropriate resources  Description: INTERVENTIONS:  - Identify barriers to discharge w/patient and caregiver  - Arrange for needed discharge resources and transportation as appropriate  - Identify discharge learning needs (meds, wound care, etc )  - Arrange for interpretive services to assist at discharge as needed  - Refer to Case Management Department for coordinating discharge planning if the patient needs post-hospital services based on physician/advanced practitioner order or complex needs related to functional status, cognitive ability, or social support system  Outcome: Progressing     Problem: Knowledge Deficit  Goal: Patient/family/caregiver demonstrates understanding of disease process, treatment plan, medications, and discharge instructions  Description: Complete learning assessment and assess knowledge base  Interventions:  - Provide teaching at level of understanding  - Provide teaching via preferred learning methods  Outcome: Progressing     Problem: Potential for Falls  Goal: Patient will remain free of falls  Description: INTERVENTIONS:  - Assess patient frequently for physical needs  -  Identify cognitive and physical deficits and behaviors that affect risk of falls    -  [place] fall precautions as indicated by assessment   - Educate patient/family on patient safety including physical limitations  - Instruct patient to call for assistance with activity based on assessment  - Modify environment to reduce risk of injury  - Consider OT/PT consult to assist with strengthening/mobility  Outcome: Progressing

## 2020-08-02 NOTE — PLAN OF CARE
Problem: Nutrition/Hydration-ADULT  Goal: Nutrient/Hydration intake appropriate for improving, restoring or maintaining nutritional needs  Description: Monitor and assess patient's nutrition/hydration status for malnutrition  Collaborate with interdisciplinary team and initiate plan and interventions as ordered  Monitor patient's weight and dietary intake as ordered or per policy  Utilize nutrition screening tool and intervene as necessary  Determine patient's food preferences and provide high-protein, high-caloric foods as appropriate       INTERVENTIONS:  - Monitor oral intake, urinary output, labs, and treatment plans  - Assess nutrition and hydration status and recommend course of action  - Evaluate amount of meals eaten  - Assist patient with eating if necessary   - Allow adequate time for meals  - Recommend/ encourage appropriate diets, oral nutritional supplements, and vitamin/mineral supplements  - Order, calculate, and assess calorie counts as needed  - Recommend, monitor, and adjust tube feedings and TPN/PPN based on assessed needs  - Assess need for intravenous fluids  - Provide specific nutrition/hydration education as appropriate  - Include patient/family/caregiver in decisions related to nutrition  Outcome: Progressing     Problem: PAIN - ADULT  Goal: Verbalizes/displays adequate comfort level or baseline comfort level  Description: Interventions:  - Encourage patient to monitor pain and request assistance  - Assess pain using appropriate pain scale  - Administer analgesics based on type and severity of pain and evaluate response  - Implement non-pharmacological measures as appropriate and evaluate response  - Consider cultural and social influences on pain and pain management  - Notify physician/advanced practitioner if interventions unsuccessful or patient reports new pain  Outcome: Progressing     Problem: SAFETY ADULT  Goal: Patient will remain free of falls  Description: INTERVENTIONS:  - Assess patient frequently for physical needs  -  Identify cognitive and physical deficits and behaviors that affect risk of falls    -  [place] fall precautions as indicated by assessment   - Educate patient/family on patient safety including physical limitations  - Instruct patient to call for assistance with activity based on assessment  - Modify environment to reduce risk of injury  - Consider OT/PT consult to assist with strengthening/mobility  Outcome: Progressing  Goal: Maintain or return to baseline ADL function  Description: INTERVENTIONS:  -  Assess patient's ability to carry out ADLs; assess patient's baseline for ADL function and identify physical deficits which impact ability to perform ADLs (bathing, care of mouth/teeth, toileting, grooming, dressing, etc )  - Assess/evaluate cause of self-care deficits   - Assess range of motion  - Assess patient's mobility; develop plan if impaired  - Assess patient's need for assistive devices and provide as appropriate  - Encourage maximum independence but intervene and supervise when necessary  - Involve family in performance of ADLs  - Assess for home care needs following discharge   - Consider OT consult to assist with ADL evaluation and planning for discharge  - Provide patient education as appropriate  Outcome: Progressing  Goal: Maintain or return mobility status to optimal level  Description: INTERVENTIONS:  - Assess patient's baseline mobility status (ambulation, transfers, stairs, etc )    - Identify cognitive and physical deficits and behaviors that affect mobility  - Identify mobility aids required to assist with transfers and/or ambulation (gait belt, sit-to-stand, lift, walker, cane, etc )  - [place] fall precautions as indicated by assessment  - Record patient progress and toleration of activity level on Mobility SBAR; progress patient to next Phase/Stage  - Instruct patient to call for assistance with activity based on assessment  - Consider rehabilitation consult to assist with strengthening/weightbearing, etc   Outcome: Progressing     Problem: DISCHARGE PLANNING  Goal: Discharge to home or other facility with appropriate resources  Description: INTERVENTIONS:  - Identify barriers to discharge w/patient and caregiver  - Arrange for needed discharge resources and transportation as appropriate  - Identify discharge learning needs (meds, wound care, etc )  - Arrange for interpretive services to assist at discharge as needed  - Refer to Case Management Department for coordinating discharge planning if the patient needs post-hospital services based on physician/advanced practitioner order or complex needs related to functional status, cognitive ability, or social support system  Outcome: Progressing     Problem: Knowledge Deficit  Goal: Patient/family/caregiver demonstrates understanding of disease process, treatment plan, medications, and discharge instructions  Description: Complete learning assessment and assess knowledge base    Interventions:  - Provide teaching at level of understanding  - Provide teaching via preferred learning methods  Outcome: Progressing

## 2020-08-02 NOTE — PROGRESS NOTES
Progress Note - OB/GYN   Sergio iKdd 21 y o  female MRN: [id number]  Unit/Bed#: [place] [address] 202- Encounter: [id number]    Sergio Kidd is a patient of [facility]    Assessment:  Hospital Day: 2; Junior Speaker is a 22 yo  at 9w1d with hyperemsis gravidarum refractory to IV zofran pump at home, admitted for IVF and antiemetic treatment, currently stable    Plan:  1  IUP 9w1d   - Heart tones assessed yday 170s per Dr Clair Vivar   - Pt unable to tolerate PO, hold PNV  2  Hyperemesis gravidarum   - Pt completed zofran pump dose   - Currently on scheduled Reglan 10mg q6h with some relief   - PRN 4mg Zofran q6h   - PRN 12 5mg Phenergan rectal suppository q6h (consider making scheduled today)   - Would add B6 and Unisom if able to tolerate PO   - Maintain clear liquid diet, possibly advance to soft foods later   - IV fluids: s/p 1L D5NS, currently on D5 1/2 NS w 20mEq K infusion   - CMP electrolytes wnl, liver enzymes elevated (54/106 AST/ALT), Cr 0 53   - Ketones 2+, glucose 500   - Nausea currently present but not severe, some vomiting overnight  3  Anemia   - Hgb 10 7, s/p 200mg IV venofer   4  Disposition   - Inpatient, VSS   - Anticipate discharge home once nausea is significantly improved, able to tolerate liquids and some solids      Subjective/Objective     Chief Complaint:     "I still feel nauseated"    Subjective:   Pain: no  Tolerating Oral Intake: no  Voiding: yes  Flatus: yes  Bowel Movement: yes  Ambulating: yes  Chest Pain: no  Shortness of Breath: no  Leg Pain/Discomfort: no    Objective:   Vitals:   /69   Pulse (!) 111   Temp 98 6 °F (37 °C)   Resp 18   LMP 2020 (Exact Date)   SpO2 100%   There is no height or weight on file to calculate BMI    I/O     None        Lab Results   Component Value Date    WBC 7 30 2020    HGB 10 7 (L) 2020    HCT 34 5 (L) 2020    MCV 80 (L) 2020     2020       Meds/Allergies   Current Facility-Administered Medications   Medication Dose Route Frequency    acetaminophen (TYLENOL) tablet 650 mg  650 mg Oral Q6H PRN    aluminum-magnesium hydroxide-simethicone (MYLANTA) 200-200-20 mg/5 mL oral suspension 15 mL  15 mL Oral Q6H PRN    calcium carbonate (TUMS) chewable tablet 1,000 mg  1,000 mg Oral BID PRN    desvenlafaxine succinate (PRISTIQ) 24 hr tablet 100 mg  100 mg Oral Daily    dextrose 5 % and sodium chloride 0 45 % with KCl 20 mEq/L infusion  125 mL/hr Intravenous Continuous    docusate sodium (COLACE) capsule 100 mg  100 mg Oral BID    metoclopramide (REGLAN) injection 10 mg  10 mg Intravenous Q6H [facility]    ondansetron (ZOFRAN) injection 4 mg  4 mg Intravenous Q6H PRN    pantoprazole (PROTONIX) injection 40 mg  40 mg Intravenous Q24H GALILEA    promethazine (PHENERGAN) rectal suppository 12 5 mg  12 5 mg Rectal Q6H PRN    sucralfate (CARAFATE) tablet 1 g  1 g Oral BID       Physical Exam:  General: in no apparent distress, well developed and well nourished and non-toxic  Cardiovascular: Cor RRR  Lungs: clear to auscultation bilaterally  Abdomen: abdomen is soft without significant tenderness, masses, organomegaly or guarding  Fundus: size appropriate for dates  Lower extremeties: nontender    Maryjane Mccray DO  PGY-4 OB/GYN   8/2/2020 7:12 AM

## 2020-08-02 NOTE — PLAN OF CARE
Problem: Nutrition/Hydration-ADULT  Goal: Nutrient/Hydration intake appropriate for improving, restoring or maintaining nutritional needs  Description  Monitor and assess patient's nutrition/hydration status for malnutrition  Collaborate with interdisciplinary team and initiate plan and interventions as ordered  Monitor patient's weight and dietary intake as ordered or per policy  Utilize nutrition screening tool and intervene as necessary  Determine patient's food preferences and provide high-protein, high-caloric foods as appropriate       INTERVENTIONS:  - Monitor oral intake, urinary output, labs, and treatment plans  - Assess nutrition and hydration status and recommend course of action  - Evaluate amount of meals eaten  - Assist patient with eating if necessary   - Allow adequate time for meals  - Recommend/ encourage appropriate diets, oral nutritional supplements, and vitamin/mineral supplements  - Order, calculate, and assess calorie counts as needed  - Recommend, monitor, and adjust tube feedings and TPN/PPN based on assessed needs  - Assess need for intravenous fluids  - Provide specific nutrition/hydration education as appropriate  - Include patient/family/caregiver in decisions related to nutrition  Outcome: Progressing     Problem: PAIN - ADULT  Goal: Verbalizes/displays adequate comfort level or baseline comfort level  Description  Interventions:  - Encourage patient to monitor pain and request assistance  - Assess pain using appropriate pain scale  - Administer analgesics based on type and severity of pain and evaluate response  - Implement non-pharmacological measures as appropriate and evaluate response  - Consider cultural and social influences on pain and pain management  - Notify physician/advanced practitioner if interventions unsuccessful or patient reports new pain  Outcome: Progressing     Problem: SAFETY ADULT  Goal: Patient will remain free of falls  Description  INTERVENTIONS:  - Assess patient frequently for physical needs  -  Identify cognitive and physical deficits and behaviors that affect risk of falls    -  [place] fall precautions as indicated by assessment   - Educate patient/family on patient safety including physical limitations  - Instruct patient to call for assistance with activity based on assessment  - Modify environment to reduce risk of injury  - Consider OT/PT consult to assist with strengthening/mobility  Outcome: Progressing  Goal: Maintain or return to baseline ADL function  Description  INTERVENTIONS:  -  Assess patient's ability to carry out ADLs; assess patient's baseline for ADL function and identify physical deficits which impact ability to perform ADLs (bathing, care of mouth/teeth, toileting, grooming, dressing, etc )  - Assess/evaluate cause of self-care deficits   - Assess range of motion  - Assess patient's mobility; develop plan if impaired  - Assess patient's need for assistive devices and provide as appropriate  - Encourage maximum independence but intervene and supervise when necessary  - Involve family in performance of ADLs  - Assess for home care needs following discharge   - Consider OT consult to assist with ADL evaluation and planning for discharge  - Provide patient education as appropriate  Outcome: Progressing  Goal: Maintain or return mobility status to optimal level  Description  INTERVENTIONS:  - Assess patient's baseline mobility status (ambulation, transfers, stairs, etc )    - Identify cognitive and physical deficits and behaviors that affect mobility  - Identify mobility aids required to assist with transfers and/or ambulation (gait belt, sit-to-stand, lift, walker, cane, etc )  - [place] fall precautions as indicated by assessment  - Record patient progress and toleration of activity level on Mobility SBAR; progress patient to next Phase/Stage  - Instruct patient to call for assistance with activity based on assessment  - Consider rehabilitation consult to assist with strengthening/weightbearing, etc   Outcome: Progressing     Problem: DISCHARGE PLANNING  Goal: Discharge to home or other facility with appropriate resources  Description  INTERVENTIONS:  - Identify barriers to discharge w/patient and caregiver  - Arrange for needed discharge resources and transportation as appropriate  - Identify discharge learning needs (meds, wound care, etc )  - Arrange for interpretive services to assist at discharge as needed  - Refer to Case Management Department for coordinating discharge planning if the patient needs post-hospital services based on physician/advanced practitioner order or complex needs related to functional status, cognitive ability, or social support system  Outcome: Progressing     Problem: Knowledge Deficit  Goal: Patient/family/caregiver demonstrates understanding of disease process, treatment plan, medications, and discharge instructions  Description  Complete learning assessment and assess knowledge base    Interventions:  - Provide teaching at level of understanding  - Provide teaching via preferred learning methods  Outcome: Progressing

## 2020-08-02 NOTE — UTILIZATION REVIEW
Initial Clinical Review    Admission: Date/Time/Statement:   Admission Orders (From admission, onward)     Ordered        20 1605  Inpatient Admission  Once                   Orders Placed This Encounter   Procedures    Inpatient Admission     Standing Status:   Standing     Number of Occurrences:   1     Order Specific Question:   Admitting Physician     Answer:   Deysi Martin     Order Specific Question:   Level of Care     Answer:   Med Surg [16]     Order Specific Question:   Estimated length of stay     Answer:   More than 2 Midnights     Order Specific Question:   Certification     Answer:   I certify that inpatient services are medically necessary for this patient for a duration of greater than two midnights  See H&P and MD Progress Notes for additional information about the patient's course of treatment  ED Arrival Information     Expected Arrival Acuity Means of Arrival Escorted By Service Admission Type    - 2020 13:39 Urgent Walk-In Family Member OB/GYN Urgent    Arrival Complaint    vomiting,hyperasthymia        Chief Complaint   Patient presents with    Abdominal Pain     Pt  reports upper abdominal pain  Pt  reports N/V/ Constipation  Pt  has not had a bowel movement in a week  Assessment/Plan: 22 yo female  at 9w1d to ED from home admitted Inpatient d/t hyperemsis gravidarum refractory to IV zofran pump at home  Nausea and vomiting overnight  unable to tolerate PO  Ketones 2+, glucose 500  Hgb 10 7, s/p 200mg IV venofer  clear liquid diet, possibly advance to soft foods  IVF and IV antiemetic  Phenergan rectal suppository      Heart tones 170s     ED Triage Vitals   Temperature Pulse Respirations Blood Pressure SpO2   20 1348 20 1348 20 1348 20 1348 20 1348   98 5 °F (36 9 °C) 97 18 117/59 99 %      Temp Source Heart Rate Source Patient Position - Orthostatic VS BP Location FiO2 (%)   20 1348 20 1348 20 1348 20 1348 -- Temporal Monitor Sitting Right arm       Pain Score       08/01/20 1623       5          Wt Readings from Last 1 Encounters:   07/29/20 82 6 kg (182 lb)     Additional Vital Signs:   08/02/20 07:07:42   98 2 °F (36 8 °C)   89   18   107/64   78   97 %          08/02/20 00:23:02   98 6 °F (37 °C)   111Abnormal        107/69   82   100 %          08/01/20 22:16:45   98 5 °F (36 9 °C)   98   18   116/63   81   99 %                             08/01/20 17:01:33   98 4 °F (36 9 °C)   85   18   116/65   82   99 %   None (Room air)   Lying    08/01/20 1623      88   16   102/54      96 %   None (Room air)       08/01/20 1348   98 5 °F (36 9 °C)   97   18   117/59      99 %   None (Room air)   Sitting       Pertinent Labs/Diagnostic Test Results:       Results from last 7 days   Lab Units 08/01/20  1418 07/26/20  1822   WBC Thousand/uL 7 30 8 73   HEMOGLOBIN g/dL 10 7* 11 9   HEMATOCRIT % 34 5* 37 6   PLATELETS Thousands/uL 212 287   NEUTROS ABS Thousands/µL 5 41 6 32         Results from last 7 days   Lab Units 08/01/20  1418 07/26/20  1822   SODIUM mmol/L 135* 135*   POTASSIUM mmol/L 3 6 3 7   CHLORIDE mmol/L 101 98*   CO2 mmol/L 24 24   ANION GAP mmol/L 10 13   BUN mg/dL 4* 9   CREATININE mg/dL 0 53* 0 64   EGFR ml/min/1 73sq m 137 129   CALCIUM mg/dL 8 7 9 2     Results from last 7 days   Lab Units 08/01/20  1418 07/26/20  1822   AST U/L 54* 22   ALT U/L 106* 35   ALK PHOS U/L 68 80   TOTAL PROTEIN g/dL 6 8 7 8   ALBUMIN g/dL 3 3* 4 0   TOTAL BILIRUBIN mg/dL 0 36 0 50         Results from last 7 days   Lab Units 08/01/20  1418 07/26/20  1822   GLUCOSE RANDOM mg/dL 84 77       Results from last 7 days   Lab Units 08/02/20  0453   TSH 3RD GENERATON uIU/mL 0 027*       Results from last 7 days   Lab Units 08/01/20  1418 07/26/20  1822   LIPASE u/L 116 104       Results from last 7 days   Lab Units 08/01/20  1608 07/26/20 1951   CLARITY UA  Slightly Cloudy CLEAR   COLOR UA  Yellow YELLOW   SPEC GRAV UA  1 020 --    SPEC GRAV US   --  1 030   PH UA  7 0 6 0   GLUCOSE UA mg/dl 500 (1/2%)* NEGATIVE   KETONES UA mg/dl 40 (2+)* LARGE   BLOOD UA  Negative NEGATIVE   PROTEIN UA mg/dl Negative 30   NITRITE UA  Negative NEGATIVE   BILIRUBIN UA  Negative  --    BILIRUBIN, UA   --  SMALL   UROBILINOGEN UA E U /dl 1 0 0 2   LEUKOCYTES UA  Negative NEGATIVE     8/2 u/s RUQ abd:Normal with normal hepatic Doppler evaluation      ED Treatment:   Medication Administration from 08/01/2020 1339 to 08/01/2020 1707       Date/Time Order Dose Route Action Action by Comments                08/01/2020 1423 dextrose 5% lactated Ringer's bolus 1,000 mL 1,000 mL Intravenous New Bag       08/01/2020 1629 dextrose 5% lactated Ringer's bolus 1,000 mL 1,000 mL Intravenous New Bag          Past Medical History:   Diagnosis Date    Abnormal ECG     no diagnosis    Anemia     Anxiety     Asthma     Depression     Endometriosis     Frequent sinus infections     GERD (gastroesophageal reflux disease)     Migraine     Miscarriage     Ovarian cyst     Syncope     Trauma     Varicella     vaccine     Present on Admission:   Hyperemesis      Admitting Diagnosis: Hyperemesis gravidarum [O21 0]  Vomiting [R11 10]  First trimester pregnancy [Z34 91]  Volume depletion [E86 9]  Age/Sex: 21 y o  female  Admission Orders:  Scheduled Medications:  desvenlafaxine succinate, 100 mg, Oral, Daily  docusate sodium, 100 mg, Oral, BID  metoclopramide, 10 mg, Intravenous, Q6H GALILEA  pantoprazole, 40 mg, Intravenous, Q24H GALILEA  sucralfate, 1 g, Oral, BID      Continuous IV Infusions:  dextrose 5 % and sodium chloride 0 45 % with KCl 20 mEq/L, 125 mL/hr, Intravenous, Continuous      PRN Meds:  acetaminophen, 650 mg, Oral, Q6H PRN 8/2 x 1  aluminum-magnesium hydroxide-simethicone, 15 mL, Oral, Q6H PRN  calcium carbonate, 1,000 mg, Oral, BID PRN  ondansetron, 4 mg, Intravenous, Q6H PRN 8/1 x 1, 8/2 x 1  promethazine, 12 5 mg, Rectal, Q6H PRN    Npo  Ambulate      IP CONSULT TO OB GYN    Network Utilization Review Department  [email or web address]  ATTENTION: Please call with any questions or concerns to [phone] and carefully listen to the prompts so that you are directed to the right person  All voicemails are confidential   Allison Collier all requests for admission clinical reviews, approved or denied determinations and any other requests to dedicated fax number below belonging to the campus where the patient is receiving treatment   List of dedicated fax numbers for the Facilities:  [address] DENIALS (Administrative/Medical Necessity) [phone]   [address] (Maternity/NICU/Pediatrics) [phone]   Jorge Sousa [phone]   Cherylene Freud [phone]   [facility] [phone]   [facility] [phone]   [address] [place] [address] [phone]   [facility] [phone]   [address] [facility]  [address] [facility] [address] [facility] [phone]

## 2020-08-02 NOTE — PROGRESS NOTES
Received a call from saad at [facility]  Patient continues to have nausea/vomiting with weakness and positive urine ketones despite subcu Zofran pump and IV fluids with D5 half-normal saline at 125 cc per hour  Given this, suggested patient come to [facility] for evaluation  At [facility], she was seen by Dr Pro Mejia, with plans for extended stay given persistent nausea/vomiting despite treatment as noted above  Plan is for Reglan, IV fluids, and possible Phenergan as needed

## 2020-08-03 ENCOUNTER — TELEPHONE (OUTPATIENT)
Dept: OBGYN CLINIC | Facility: CLINIC | Age: 20
End: 2020-08-03

## 2020-08-03 ENCOUNTER — TRANSITIONAL CARE MANAGEMENT (OUTPATIENT)
Dept: FAMILY MEDICINE CLINIC | Facility: CLINIC | Age: 20
End: 2020-08-03

## 2020-08-03 VITALS
SYSTOLIC BLOOD PRESSURE: 103 MMHG | OXYGEN SATURATION: 97 % | DIASTOLIC BLOOD PRESSURE: 62 MMHG | HEART RATE: 79 BPM | RESPIRATION RATE: 18 BRPM | TEMPERATURE: 97.9 F

## 2020-08-03 DIAGNOSIS — Z3A.09 9 WEEKS GESTATION OF PREGNANCY: ICD-10-CM

## 2020-08-03 LAB
ALBUMIN SERPL BCP-MCNC: 3 G/DL (ref 3.5–5)
ALP SERPL-CCNC: 59 U/L (ref 46–116)
ALT SERPL W P-5'-P-CCNC: 60 U/L (ref 12–78)
ANION GAP SERPL CALCULATED.3IONS-SCNC: 11 MMOL/L (ref 4–13)
AST SERPL W P-5'-P-CCNC: 18 U/L (ref 5–45)
BASOPHILS # BLD AUTO: 0.01 THOUSANDS/ΜL (ref 0–0.1)
BASOPHILS NFR BLD AUTO: 0 % (ref 0–1)
BILIRUB SERPL-MCNC: 0.19 MG/DL (ref 0.2–1)
BUN SERPL-MCNC: 2 MG/DL (ref 5–25)
CALCIUM SERPL-MCNC: 8.6 MG/DL (ref 8.3–10.1)
CHLORIDE SERPL-SCNC: 103 MMOL/L (ref 100–108)
CO2 SERPL-SCNC: 23 MMOL/L (ref 21–32)
CREAT SERPL-MCNC: 0.49 MG/DL (ref 0.6–1.3)
EOSINOPHIL # BLD AUTO: 0.28 THOUSAND/ΜL (ref 0–0.61)
EOSINOPHIL NFR BLD AUTO: 7 % (ref 0–6)
ERYTHROCYTE [DISTWIDTH] IN BLOOD BY AUTOMATED COUNT: 15.6 % (ref 11.6–15.1)
GFR SERPL CREATININE-BSD FRML MDRD: 141 ML/MIN/1.73SQ M
GLUCOSE SERPL-MCNC: 92 MG/DL (ref 65–140)
HCT VFR BLD AUTO: 34.1 % (ref 34.8–46.1)
HGB BLD-MCNC: 10.4 G/DL (ref 11.5–15.4)
IMM GRANULOCYTES # BLD AUTO: 0.02 THOUSAND/UL (ref 0–0.2)
IMM GRANULOCYTES NFR BLD AUTO: 1 % (ref 0–2)
LYMPHOCYTES # BLD AUTO: 1.26 THOUSANDS/ΜL (ref 0.6–4.47)
LYMPHOCYTES NFR BLD AUTO: 29 % (ref 14–44)
MCH RBC QN AUTO: 24.4 PG (ref 26.8–34.3)
MCHC RBC AUTO-ENTMCNC: 30.5 G/DL (ref 31.4–37.4)
MCV RBC AUTO: 80 FL (ref 82–98)
MONOCYTES # BLD AUTO: 0.44 THOUSAND/ΜL (ref 0.17–1.22)
MONOCYTES NFR BLD AUTO: 10 % (ref 4–12)
NEUTROPHILS # BLD AUTO: 2.31 THOUSANDS/ΜL (ref 1.85–7.62)
NEUTS SEG NFR BLD AUTO: 53 % (ref 43–75)
NRBC BLD AUTO-RTO: 0 /100 WBCS
PLATELET # BLD AUTO: 202 THOUSANDS/UL (ref 149–390)
PMV BLD AUTO: 10.9 FL (ref 8.9–12.7)
POTASSIUM SERPL-SCNC: 3.7 MMOL/L (ref 3.5–5.3)
PROT SERPL-MCNC: 6.3 G/DL (ref 6.4–8.2)
RBC # BLD AUTO: 4.27 MILLION/UL (ref 3.81–5.12)
SODIUM SERPL-SCNC: 137 MMOL/L (ref 136–145)
WBC # BLD AUTO: 4.32 THOUSAND/UL (ref 4.31–10.16)

## 2020-08-03 PROCEDURE — 85025 COMPLETE CBC W/AUTO DIFF WBC: CPT | Performed by: OBSTETRICS & GYNECOLOGY

## 2020-08-03 PROCEDURE — 80053 COMPREHEN METABOLIC PANEL: CPT | Performed by: OBSTETRICS & GYNECOLOGY

## 2020-08-03 PROCEDURE — 99238 HOSP IP/OBS DSCHRG MGMT 30/<: CPT | Performed by: OBSTETRICS & GYNECOLOGY

## 2020-08-03 PROCEDURE — C9113 INJ PANTOPRAZOLE SODIUM, VIA: HCPCS | Performed by: OBSTETRICS & GYNECOLOGY

## 2020-08-03 PROCEDURE — NC001 PR NO CHARGE: Performed by: OBSTETRICS & GYNECOLOGY

## 2020-08-03 RX ORDER — METOCLOPRAMIDE 5 MG/1
5 TABLET ORAL 4 TIMES DAILY
Qty: 20 TABLET | Refills: 0 | Status: SHIPPED | OUTPATIENT
Start: 2020-08-03 | End: 2020-08-03 | Stop reason: SDUPTHER

## 2020-08-03 RX ORDER — METOCLOPRAMIDE 5 MG/1
5 TABLET ORAL 4 TIMES DAILY
Qty: 20 TABLET | Refills: 3 | Status: SHIPPED | OUTPATIENT
Start: 2020-08-03 | End: 2020-08-06 | Stop reason: SDUPTHER

## 2020-08-03 RX ADMIN — DOCUSATE SODIUM 100 MG: 100 CAPSULE, LIQUID FILLED ORAL at 08:27

## 2020-08-03 RX ADMIN — Medication 25 MG: at 08:27

## 2020-08-03 RX ADMIN — ONDANSETRON 4 MG: 2 INJECTION INTRAMUSCULAR; INTRAVENOUS at 07:18

## 2020-08-03 RX ADMIN — DEXTROSE, SODIUM CHLORIDE, AND POTASSIUM CHLORIDE 125 ML/HR: 5; .45; .15 INJECTION INTRAVENOUS at 07:15

## 2020-08-03 RX ADMIN — METOCLOPRAMIDE 10 MG: 5 INJECTION, SOLUTION INTRAMUSCULAR; INTRAVENOUS at 05:14

## 2020-08-03 RX ADMIN — METOCLOPRAMIDE 10 MG: 5 INJECTION, SOLUTION INTRAMUSCULAR; INTRAVENOUS at 11:50

## 2020-08-03 RX ADMIN — SUCRALFATE 1 G: 1 TABLET ORAL at 08:27

## 2020-08-03 RX ADMIN — PANTOPRAZOLE SODIUM 40 MG: 40 INJECTION, POWDER, FOR SOLUTION INTRAVENOUS at 08:29

## 2020-08-03 NOTE — NURSING NOTE
Patient was instructed to follow up with her OB and her scheduled appointments  Instructed to contact [facility] to get her Zofran pump set up

## 2020-08-03 NOTE — SOCIAL WORK
Referral to [facility] informing of pt's admission and need to restart pump at dosing prior to admission  Addendum:  [facility]  Pt informed to contact service upon d/c home to have pump restarted  Verbal understanding of same given

## 2020-08-03 NOTE — PLAN OF CARE
Problem: Nutrition/Hydration-ADULT  Goal: Nutrient/Hydration intake appropriate for improving, restoring or maintaining nutritional needs  Description: Monitor and assess patient's nutrition/hydration status for malnutrition  Collaborate with interdisciplinary team and initiate plan and interventions as ordered  Monitor patient's weight and dietary intake as ordered or per policy  Utilize nutrition screening tool and intervene as necessary  Determine patient's food preferences and provide high-protein, high-caloric foods as appropriate       INTERVENTIONS:  - Monitor oral intake, urinary output, labs, and treatment plans  - Assess nutrition and hydration status and recommend course of action  - Evaluate amount of meals eaten  - Assist patient with eating if necessary   - Allow adequate time for meals  - Recommend/ encourage appropriate diets, oral nutritional supplements, and vitamin/mineral supplements  - Order, calculate, and assess calorie counts as needed  - Recommend, monitor, and adjust tube feedings and TPN/PPN based on assessed needs  - Assess need for intravenous fluids  - Provide specific nutrition/hydration education as appropriate  - Include patient/family/caregiver in decisions related to nutrition  Outcome: Progressing     Problem: PAIN - ADULT  Goal: Verbalizes/displays adequate comfort level or baseline comfort level  Description: Interventions:  - Encourage patient to monitor pain and request assistance  - Assess pain using appropriate pain scale  - Administer analgesics based on type and severity of pain and evaluate response  - Implement non-pharmacological measures as appropriate and evaluate response  - Consider cultural and social influences on pain and pain management  - Notify physician/advanced practitioner if interventions unsuccessful or patient reports new pain  Outcome: Progressing     Problem: SAFETY ADULT  Goal: Patient will remain free of falls  Description: INTERVENTIONS:  - Assess patient frequently for physical needs  -  Identify cognitive and physical deficits and behaviors that affect risk of falls    -  [place] fall precautions as indicated by assessment   - Educate patient/family on patient safety including physical limitations  - Instruct patient to call for assistance with activity based on assessment  - Modify environment to reduce risk of injury  - Consider OT/PT consult to assist with strengthening/mobility  Outcome: Progressing  Goal: Maintain or return to baseline ADL function  Description: INTERVENTIONS:  -  Assess patient's ability to carry out ADLs; assess patient's baseline for ADL function and identify physical deficits which impact ability to perform ADLs (bathing, care of mouth/teeth, toileting, grooming, dressing, etc )  - Assess/evaluate cause of self-care deficits   - Assess range of motion  - Assess patient's mobility; develop plan if impaired  - Assess patient's need for assistive devices and provide as appropriate  - Encourage maximum independence but intervene and supervise when necessary  - Involve family in performance of ADLs  - Assess for home care needs following discharge   - Consider OT consult to assist with ADL evaluation and planning for discharge  - Provide patient education as appropriate  Outcome: Progressing  Goal: Maintain or return mobility status to optimal level  Description: INTERVENTIONS:  - Assess patient's baseline mobility status (ambulation, transfers, stairs, etc )    - Identify cognitive and physical deficits and behaviors that affect mobility  - Identify mobility aids required to assist with transfers and/or ambulation (gait belt, sit-to-stand, lift, walker, cane, etc )  - [place] fall precautions as indicated by assessment  - Record patient progress and toleration of activity level on Mobility SBAR; progress patient to next Phase/Stage  - Instruct patient to call for assistance with activity based on assessment  - Consider rehabilitation consult to assist with strengthening/weightbearing, etc   Outcome: Progressing     Problem: DISCHARGE PLANNING  Goal: Discharge to home or other facility with appropriate resources  Description: INTERVENTIONS:  - Identify barriers to discharge w/patient and caregiver  - Arrange for needed discharge resources and transportation as appropriate  - Identify discharge learning needs (meds, wound care, etc )  - Arrange for interpretive services to assist at discharge as needed  - Refer to Case Management Department for coordinating discharge planning if the patient needs post-hospital services based on physician/advanced practitioner order or complex needs related to functional status, cognitive ability, or social support system  Outcome: Progressing     Problem: Knowledge Deficit  Goal: Patient/family/caregiver demonstrates understanding of disease process, treatment plan, medications, and discharge instructions  Description: Complete learning assessment and assess knowledge base  Interventions:  - Provide teaching at level of understanding  - Provide teaching via preferred learning methods  Outcome: Progressing     Problem: Potential for Falls  Goal: Patient will remain free of falls  Description: INTERVENTIONS:  - Assess patient frequently for physical needs  -  Identify cognitive and physical deficits and behaviors that affect risk of falls    -  [place] fall precautions as indicated by assessment   - Educate patient/family on patient safety including physical limitations  - Instruct patient to call for assistance with activity based on assessment  - Modify environment to reduce risk of injury  - Consider OT/PT consult to assist with strengthening/mobility  Outcome: Progressing

## 2020-08-03 NOTE — DISCHARGE INSTR - OTHER ORDERS
[facility]:  Call agency upon arriving home to have zofran pump restarted  Should they need anything further, have them call Dr Sheree Huang office directly

## 2020-08-03 NOTE — DISCHARGE INSTRUCTIONS
Acute Nausea and Vomiting   WHAT YOU NEED TO KNOW:   Acute nausea and vomiting start suddenly, worsen quickly, and last a short time  DISCHARGE INSTRUCTIONS:   Return to the emergency department if:   · You see blood in your vomit or your bowel movements  · You have sudden, severe pain in your chest and upper abdomen after hard vomiting or retching  · You have swelling in your neck and chest      · You are dizzy, cold, and thirsty and your eyes and mouth are dry  · You are urinating very little or not at all  · You have muscle weakness, leg cramps, and trouble breathing  · Your heart is beating much faster than normal      · You continue to vomit for more than 48 hours  Contact your healthcare provider if:   · You have frequent dry heaves (vomiting but nothing comes out)  · Your nausea and vomiting does not get better or go away after you use medicine  · You have questions or concerns about your condition or treatment  Medicines: You may need any of the following:  · Medicines  may be given to calm your stomach and stop your vomiting  You may also need medicines to help you feel more relaxed or to stop nausea and vomiting caused by motion sickness  · Gastrointestinal stimulants  are used to help empty your stomach and bowels  This may help decrease nausea and vomiting  · Take your medicine as directed  Contact your healthcare provider if you think your medicine is not helping or if you have side effects  Tell him or her if you are allergic to any medicine  Keep a list of the medicines, vitamins, and herbs you take  Include the amounts, and when and why you take them  Bring the list or the pill bottles to follow-up visits  Carry your medicine list with you in case of an emergency  Prevent or manage acute nausea and vomiting:   · Do not drink alcohol  Alcohol may upset or irritate your stomach  Too much alcohol can also cause acute nausea and vomiting  · Control stress    Headaches due to stress may cause nausea and vomiting  Find ways to relax and manage your stress  Get more rest and sleep  · Drink more liquids as directed  Vomiting can lead to dehydration  It is important to drink more liquids to help replace lost body fluids  Ask your healthcare provider how much liquid to drink each day and which liquids are best for you  Your provider may recommend that you drink an oral rehydration solution (ORS)  ORS contains water, salts, and sugar that are needed to replace the lost body fluids  Ask what kind of ORS to use, how much to drink, and where to get it  · Eat smaller meals, more often  Eat small amounts of food every 2 to 3 hours, even if you are not hungry  Food in your stomach may decrease your nausea  · Talk to your healthcare provider before you take over-the-counter (OTC) medicines  These medicines can cause serious problems if you use certain other medicines, or you have a medical condition  You may have problems if you use too much or use them for longer than the label says  Follow directions on the label carefully  Follow up with your healthcare provider as directed:  Write down your questions so you remember to ask them during your follow-up visits  © 2017 [employer] Information is for End User's use only and may not be sold, redistributed or otherwise used for commercial purposes  All illustrations and images included in CareNotes® are the copyrighted property of [employer]  or Patrice Danielle  The above information is an  only  It is not intended as medical advice for individual conditions or treatments  Talk to your doctor, nurse or pharmacist before following any medical regimen to see if it is safe and effective for you

## 2020-08-03 NOTE — PLAN OF CARE
Problem: Nutrition/Hydration-ADULT  Goal: Nutrient/Hydration intake appropriate for improving, restoring or maintaining nutritional needs  Description: Monitor and assess patient's nutrition/hydration status for malnutrition  Collaborate with interdisciplinary team and initiate plan and interventions as ordered  Monitor patient's weight and dietary intake as ordered or per policy  Utilize nutrition screening tool and intervene as necessary  Determine patient's food preferences and provide high-protein, high-caloric foods as appropriate       INTERVENTIONS:  - Monitor oral intake, urinary output, labs, and treatment plans  - Assess nutrition and hydration status and recommend course of action  - Evaluate amount of meals eaten  - Assist patient with eating if necessary   - Allow adequate time for meals  - Recommend/ encourage appropriate diets, oral nutritional supplements, and vitamin/mineral supplements  - Order, calculate, and assess calorie counts as needed  - Recommend, monitor, and adjust tube feedings and TPN/PPN based on assessed needs  - Assess need for intravenous fluids  - Provide specific nutrition/hydration education as appropriate  - Include patient/family/caregiver in decisions related to nutrition  Outcome: Adequate for Discharge     Problem: PAIN - ADULT  Goal: Verbalizes/displays adequate comfort level or baseline comfort level  Description: Interventions:  - Encourage patient to monitor pain and request assistance  - Assess pain using appropriate pain scale  - Administer analgesics based on type and severity of pain and evaluate response  - Implement non-pharmacological measures as appropriate and evaluate response  - Consider cultural and social influences on pain and pain management  - Notify physician/advanced practitioner if interventions unsuccessful or patient reports new pain  Outcome: Adequate for Discharge     Problem: SAFETY ADULT  Goal: Patient will remain free of falls  Description: INTERVENTIONS:  - Assess patient frequently for physical needs  -  Identify cognitive and physical deficits and behaviors that affect risk of falls    -  [place] fall precautions as indicated by assessment   - Educate patient/family on patient safety including physical limitations  - Instruct patient to call for assistance with activity based on assessment  - Modify environment to reduce risk of injury  - Consider OT/PT consult to assist with strengthening/mobility  Outcome: Adequate for Discharge  Goal: Maintain or return to baseline ADL function  Description: INTERVENTIONS:  -  Assess patient's ability to carry out ADLs; assess patient's baseline for ADL function and identify physical deficits which impact ability to perform ADLs (bathing, care of mouth/teeth, toileting, grooming, dressing, etc )  - Assess/evaluate cause of self-care deficits   - Assess range of motion  - Assess patient's mobility; develop plan if impaired  - Assess patient's need for assistive devices and provide as appropriate  - Encourage maximum independence but intervene and supervise when necessary  - Involve family in performance of ADLs  - Assess for home care needs following discharge   - Consider OT consult to assist with ADL evaluation and planning for discharge  - Provide patient education as appropriate  Outcome: Adequate for Discharge  Goal: Maintain or return mobility status to optimal level  Description: INTERVENTIONS:  - Assess patient's baseline mobility status (ambulation, transfers, stairs, etc )    - Identify cognitive and physical deficits and behaviors that affect mobility  - Identify mobility aids required to assist with transfers and/or ambulation (gait belt, sit-to-stand, lift, walker, cane, etc )  - [place] fall precautions as indicated by assessment  - Record patient progress and toleration of activity level on Mobility SBAR; progress patient to next Phase/Stage  - Instruct patient to call for assistance with activity based on assessment  - Consider rehabilitation consult to assist with strengthening/weightbearing, etc   Outcome: Adequate for Discharge     Problem: DISCHARGE PLANNING  Goal: Discharge to home or other facility with appropriate resources  Description: INTERVENTIONS:  - Identify barriers to discharge w/patient and caregiver  - Arrange for needed discharge resources and transportation as appropriate  - Identify discharge learning needs (meds, wound care, etc )  - Arrange for interpretive services to assist at discharge as needed  - Refer to Case Management Department for coordinating discharge planning if the patient needs post-hospital services based on physician/advanced practitioner order or complex needs related to functional status, cognitive ability, or social support system  Outcome: Adequate for Discharge     Problem: Knowledge Deficit  Goal: Patient/family/caregiver demonstrates understanding of disease process, treatment plan, medications, and discharge instructions  Description: Complete learning assessment and assess knowledge base  Interventions:  - Provide teaching at level of understanding  - Provide teaching via preferred learning methods  Outcome: Adequate for Discharge     Problem: Potential for Falls  Goal: Patient will remain free of falls  Description: INTERVENTIONS:  - Assess patient frequently for physical needs  -  Identify cognitive and physical deficits and behaviors that affect risk of falls    -  [place] fall precautions as indicated by assessment   - Educate patient/family on patient safety including physical limitations  - Instruct patient to call for assistance with activity based on assessment  - Modify environment to reduce risk of injury  - Consider OT/PT consult to assist with strengthening/mobility  Outcome: Adequate for Discharge

## 2020-08-03 NOTE — DISCHARGE SUMMARY
Discharge Summary - Gynecology  David Franz 21 y o  female MRN: [id number]  Unit/Bed#: [place] [address] 202-01 Encounter: [id number]    Admission Date: 8/1/2020   Discharge Date: 08/03/20    Attending Physician:   Dr Estefania Davis Physician(s):   Dr Lian Thomson    Admitting Diagnosis:   Hyperemesis gravidarum with 9 weeks intrauterine pregnancy  Discharge Diagnosis:   Hyperemesis gravidarum with 9 weeks intrauterine pregnancy    Procedures Performed:   IV hydration, antiemetics this medication treatment    HPI:    Patient was complaining of continued nausea vomiting with weakness and positive urine ketones despite an subcutaneous Zofran pump and IV fluid resuscitation with D5 and half-normal saline  Patient had admission with indication of hyperemesis gravidarum  Reglan, IV fluids,Phenergan, Zofran, Unisom and B6 were given  Patient on admission started tolerating p o  Regular diet and able to drain to  Fetal heart tone measured as 170s  Patient history significant for anemia Venofer  mg has been given  Electrolytes checked in the morning which was normal     Lab Results:   Lab Results   Component Value Date    WBC 4 32 08/03/2020    HGB 10 4 (L) 08/03/2020    HCT 34 1 (L) 08/03/2020    MCV 80 (L) 08/03/2020     08/03/2020     Lab Results   Component Value Date    CALCIUM 8 6 08/03/2020    K 3 7 08/03/2020    CO2 23 08/03/2020     08/03/2020    BUN 2 (L) 08/03/2020    CREATININE 0 49 (L) 08/03/2020     No results found for: POCGLU  No results found for: PTT  No results found for: INR, PROTIME    Complications:   none    Condition at Discharge:   good     Discharge Medications:   See after visit summary for reconciled discharge medications provided to patient and family  Discharge instructions: See after visit summary for complete information  Do not place anything (no partner, tampons or douche) in your vagina for 6 weeks    You may walk for exercise for the first 6 weeks then gradually return to your usual activities     You may take stairs one at a time, touching each step with both feet for the first few days, then as tolerated  Please do not drive until tolerating pain and off of narcotics     Please look at your incision in the mirror daily and call for redness or tenderness or increased warmth   Call us for increasing redness or steady drainage from the incision     Please call us for temperature > 100 4*F or 38* C, worsening pain or a foul discharge  No heavy lifting more than one full gallon of milk (about 8 lbs)  No tub baths or swimming  Provisions for Follow-Up Care:   See after visit summary for information related to follow-up care and any pertinent home health orders  Disposition: Home  Planned Readmission: No    Code Status: Full code  Discharge Statement   I spent 20 minutes discharging the patient  This time was spent on the day of discharge  I had direct contact with the patient on the day of discharge  Additional documentation is required if more than 30 minutes were spent on discharge       Monique Lawson MD  3/6/8975  1:89 PM

## 2020-08-03 NOTE — PROGRESS NOTES
Gynecology Progress note   Eliza Ring 21 y o  female MRN: [id number]  Unit/Bed#: [place] 68 2 [address] 202-01 Encounter: [id number]    Subjective:    Eliza Ring has no current complaints  Pain is none  Patient is currently voiding  She is ambulating  Patient is currently passing flatus and has had bowel movement  She is tolerating PO, and denies nausea or vomitting  Patient denies fever, chills, chest pain, shortness of breath, or calf tenderness  /63   Pulse 85   Temp 97 7 °F (36 5 °C)   Resp 18   LMP 05/28/2020 (Exact Date)   SpO2 99%     No intake/output data recorded  No intake/output data recorded  Lab Results   Component Value Date    WBC 4 32 08/03/2020    HGB 10 4 (L) 08/03/2020    HCT 34 1 (L) 08/03/2020    MCV 80 (L) 08/03/2020     08/03/2020       Lab Results   Component Value Date    CALCIUM 8 6 08/03/2020    K 3 7 08/03/2020    CO2 23 08/03/2020     08/03/2020    BUN 2 (L) 08/03/2020    CREATININE 0 49 (L) 08/03/2020       No results found for: POCGLU    Physical exam:     Physical Exam   Constitutional: She is oriented to person, place, and time  She appears well-developed and well-nourished  No distress  Cardiovascular: Regular rhythm  Pulmonary/Chest: Effort normal    Abdominal: Soft  Musculoskeletal: Normal range of motion  Neurological: She is alert and oriented to person, place, and time  Skin: Skin is warm  She is not diaphoretic  Psychiatric: She has a normal mood and affect  Her behavior is normal        A/P: 21 y o  IUP at 9w1d hyperemesis gravidarum  1) IUP  - heart tones 170s per Dr Kristal Pena  - patient is tolerating p o  And yesterday she had regular diet and keep it down    2 ) Hyperemesis gravidarum  - patient is on Zofran pump will continue at home  - she mentioned Reglan is also working well for her  - she may have gastroenteritis which compromise the ongoing hyperemesis gravidarum  - electrolyte within normal limits on a m   CMP    3) anemia  - hemoglobin 10 7, status post 200 mg Venofer    4) Diet: regular as tolerated  5) DVT PPx: SCDs  6) Encouraged ambulation as tolerated  7) Dispo: plan for discharge today        Scarlet Foote MD  6/7/3122  6:97 AM

## 2020-08-03 NOTE — TELEPHONE ENCOUNTER
Per Dr Gordon Mcarthur called [facility]  A nurse from [facility] will contact patient ttalma to arrange to meet at patient's home with all the needed supplies for her Zofran pump, including the medication

## 2020-08-05 NOTE — UTILIZATION REVIEW
Continued Stay Review    Date: 8/2/20                Current Patient Class: IP Current Level of Care: [facility]    HPI:20 y o  female initially admitted on 8/1/20 to IP  d/t hyperemesis gravidarum refractory to IV zofran pump at home  Assessment/Plan:  Continues with nausea  Continue IVF's, IV Reglan & IV Protonix  Required IV Zofran IV x 2  Advance to Reg diet  Discharged Home 8/3  [facility]         Pertinent Labs/Diagnostic Results:     Results from last 7 days   Lab Units 08/03/20  0521 08/01/20  1418   WBC Thousand/uL 4 32 7 30   HEMOGLOBIN g/dL 10 4* 10 7*   HEMATOCRIT % 34 1* 34 5*   PLATELETS Thousands/uL 202 212   NEUTROS ABS Thousands/µL 2 31 5 41     Results from last 7 days   Lab Units 08/03/20  0515 08/01/20  1418   SODIUM mmol/L 137 135*   POTASSIUM mmol/L 3 7 3 6   CHLORIDE mmol/L 103 101   CO2 mmol/L 23 24   ANION GAP mmol/L 11 10   BUN mg/dL 2* 4*   CREATININE mg/dL 0 49* 0 53*   EGFR ml/min/1 73sq m 141 137   CALCIUM mg/dL 8 6 8 7     Results from last 7 days   Lab Units 08/03/20  0515 08/01/20  1418   AST U/L 18 54*   ALT U/L 60 106*   ALK PHOS U/L 59 68   TOTAL PROTEIN g/dL 6 3* 6 8   ALBUMIN g/dL 3 0* 3 3*   TOTAL BILIRUBIN mg/dL 0 19* 0 36     Results from last 7 days   Lab Units 08/03/20  0515 08/01/20  1418   GLUCOSE RANDOM mg/dL 92 84     Results from last 7 days   Lab Units 08/02/20  0453   TSH 3RD GENERATON uIU/mL 0 027*     Results from last 7 days   Lab Units 08/02/20  0453   HEP B S AG  Non-reactive   HEP C AB  Non-reactive   HEP B C IGM  Non-reactive     Results from last 7 days   Lab Units 08/01/20  1418   LIPASE u/L 116     Results from last 7 days   Lab Units 08/01/20  1608   CLARITY UA  Slightly Cloudy   COLOR UA  Yellow   SPEC GRAV UA  1 020   PH UA  7 0   GLUCOSE UA mg/dl 500 (1/2%)*   KETONES UA mg/dl 40 (2+)*   BLOOD UA  Negative   PROTEIN UA mg/dl Negative   NITRITE UA  Negative   BILIRUBIN UA  Negative   UROBILINOGEN UA E U /dl 1  0   LEUKOCYTES UA  Negative     Results from last 7 days   Lab Units 08/01/20  1608   URINE CULTURE  50,000-59,000 cfu/ml      Vital Signs:   08/02/20 22:40:06   97 7 °F (36 5 °C)   85   18   104/63   77   99 %    08/02/20 14:42:40   97 9 °F (36 6 °C)   83   18   105/64   78   97 %    08/02/20 14:41:59   97 9 °F (36 6 °C)   78   18   105/64   78   98 %    08/02/20 07:07:42   98 2 °F (36 8 °C)   89   18   107/64   78   97 %    08/02/20 00:23:02   98 6 °F (37 °C)   111Abnormal        107/69   82   100 %          Medications:   Scheduled Medications:  desvenlafaxine succinate, 100 mg, Oral, Daily  docusate sodium, 100 mg, Oral, BID  metoclopramide, 10 mg, Intravenous, Q6H GALILEA  pantoprazole, 40 mg, Intravenous, Q24H GALILEA  sucralfate, 1 g, Oral, BID   pyridoxine (VITAMIN B6) tablet 25 mg  Oral,   TID    Continuous IV Infusions:  dextrose 5 % and sodium chloride 0 45 % with KCl 20 mEq/L, 125 mL/hr, Intravenous, Continuous     PRN Meds:  acetaminophen, 650 mg, Oral, Q6H PRN 8/2 x 2  aluminum-magnesium hydroxide-simethicone, 15 mL, Oral, Q6H PRN  calcium carbonate, 1,000 mg, Oral, BID PRN  ondansetron, 4 mg, Intravenous, Q6H PRN 8/1 x 1, 8/2 x 2 ( 0233  & 6319)   promethazine, 12 5 mg, Rectal, Q6H PRN    Discharge Plan: Home 8/3  Network Utilization Review Department  [email or web address]  ATTENTION: Please call with any questions or concerns to [phone] and carefully listen to the prompts so that you are directed to the right person  All voicemails are confidential   [facility] all requests for admission clinical reviews, approved or denied determinations and any other requests to dedicated fax number below belonging to the campus where the patient is receiving treatment   List of dedicated fax numbers for the Facilities:  [address] DENIALS (Administrative/Medical Necessity) [phone]   1000 [facility]  (Maternity/NICU/Pediatrics) [phone]   Patito Sen Mohit Kwan [phone]   Kimberley Concepcion [phone]   Izabella Elena [phone]   [address] [phone]   [facility]  [phone]   2205 [facility] [address] [facility]  2401 [facility] 1000 [address] [facility] [phone]

## 2020-08-06 DIAGNOSIS — Z3A.09 9 WEEKS GESTATION OF PREGNANCY: ICD-10-CM

## 2020-08-06 RX ORDER — METOCLOPRAMIDE 5 MG/1
5 TABLET ORAL 4 TIMES DAILY
Qty: 20 TABLET | Refills: 2 | Status: SHIPPED | OUTPATIENT
Start: 2020-08-06 | End: 2020-08-12 | Stop reason: SDUPTHER

## 2020-08-11 ENCOUNTER — TELEPHONE (OUTPATIENT)
Dept: OBGYN CLINIC | Facility: CLINIC | Age: 20
End: 2020-08-11

## 2020-08-11 NOTE — TELEPHONE ENCOUNTER
Minerva Severs from [facility] called to leave message of update on OB patient  Patient is on Zofran pump  States had a "bad weekend", feels better today  Drank 64 oz of water, moderate keytones, lost 6 pounds over weekend and is now 179 lbs  Patient is constipated and did  not have a BM for 5 days  Is taking Colace  Used a Glycerin suppository on 8/8 with success  Called patient to check  Patient has sore throat but thinks it is from allergies  Patient states occasionally has temperature of 100 2 but says Dr  At hospital told her that was WNL during pregnancy  Advised patient to check her temperature in the morning and call before her appointment tomorrow if elevated  Advised continue to eat small frequent meals and to hydrate

## 2020-08-11 NOTE — UTILIZATION REVIEW
Continued Stay Review    Date: 8/2/20                Current Patient Class: IP Current Level of Care: [facility]    HPI:20 y o  female initially admitted on 8/1/20 to IP  d/t hyperemesis gravidarum refractory to IV zofran pump at home  Assessment/Plan:  Continues with nausea  Continue IVF's, IV Reglan & IV Protonix  Required IV Zofran IV x 2  Advance to Reg diet  Discharged Home 8/3  [facility]  Pertinent Labs/Diagnostic Results:                                         Vital Signs:   08/02/20 22:40:06   97 7 °F (36 5 °C)   85   18   104/63   77   99 %    08/02/20 14:42:40   97 9 °F (36 6 °C)   83   18   105/64   78   97 %    08/02/20 14:41:59   97 9 °F (36 6 °C)   78   18   105/64   78   98 %    08/02/20 07:07:42   98 2 °F (36 8 °C)   89   18   107/64   78   97 %    08/02/20 00:23:02   98 6 °F (37 °C)   111Abnormal        107/69   82   100 %          Medications:   Scheduled Medications:  desvenlafaxine succinate, 100 mg, Oral, Daily  docusate sodium, 100 mg, Oral, BID  metoclopramide, 10 mg, Intravenous, Q6H GALILEA  pantoprazole, 40 mg, Intravenous, Q24H GALILEA  sucralfate, 1 g, Oral, BID   pyridoxine (VITAMIN B6) tablet 25 mg  Oral,   TID    Continuous IV Infusions:  dextrose 5 % and sodium chloride 0 45 % with KCl 20 mEq/L, 125 mL/hr, Intravenous, Continuous     PRN Meds:  acetaminophen, 650 mg, Oral, Q6H PRN 8/2 x 2  aluminum-magnesium hydroxide-simethicone, 15 mL, Oral, Q6H PRN  calcium carbonate, 1,000 mg, Oral, BID PRN  ondansetron, 4 mg, Intravenous, Q6H PRN 8/1 x 1, 8/2 x 2 ( 8066  & 2448)   promethazine, 12 5 mg, Rectal, Q6H PRN    Discharge Plan: Home 8/3  Network Utilization Review Department  [email or web address]  ATTENTION: Please call with any questions or concerns to [phone] and carefully listen to the prompts so that you are directed to the right person   All voicemails are confidential   [facility] all requests for admission clinical reviews, approved or denied determinations and any other requests to dedicated fax number below belonging to the campus where the patient is receiving treatment   List of dedicated fax numbers for the Facilities:  [address] [place] [address] DENIALS (Administrative/Medical Necessity) [phone]   [address] (Maternity/NICU/Pediatrics) [phone]   Makenzie Plunkett [phone]   Larisa Pierson [phone]   Cesar Pacheco [phone]   Gerri Nyhan [phone]   [address] [place] [address] [phone]   [facility]  [phone]   2205 [facility] [address] [facility]  2401 [facility] 1000 [address] [facility] [phone]

## 2020-08-11 NOTE — UTILIZATION REVIEW
PER FAX RECEIVED - NEED  FOR 08/02 - CONTINUE STAY FOR 08/02 AND DC SUMMARY WAS FAXED ON 08/05 @ 1316 PM - REFAX  FOR 08/02 W/ DC SUMMARY OF 08/03 TODAY 08/11

## 2020-08-12 ENCOUNTER — ROUTINE PRENATAL (OUTPATIENT)
Dept: OBGYN CLINIC | Facility: CLINIC | Age: 20
End: 2020-08-12
Payer: COMMERCIAL

## 2020-08-12 VITALS
DIASTOLIC BLOOD PRESSURE: 72 MMHG | WEIGHT: 181.2 LBS | BODY MASS INDEX: 29.12 KG/M2 | HEIGHT: 66 IN | SYSTOLIC BLOOD PRESSURE: 108 MMHG | TEMPERATURE: 97.6 F

## 2020-08-12 DIAGNOSIS — Z3A.10 10 WEEKS GESTATION OF PREGNANCY: ICD-10-CM

## 2020-08-12 DIAGNOSIS — R79.89 DECREASED THYROID STIMULATING HORMONE (TSH) LEVEL: ICD-10-CM

## 2020-08-12 DIAGNOSIS — Z36.9 ANTENATAL SCREENING ENCOUNTER: Primary | ICD-10-CM

## 2020-08-12 DIAGNOSIS — Z3A.09 9 WEEKS GESTATION OF PREGNANCY: ICD-10-CM

## 2020-08-12 DIAGNOSIS — R11.10 HYPEREMESIS: ICD-10-CM

## 2020-08-12 LAB
SL AMB  POCT GLUCOSE, UA: NORMAL
SL AMB POCT URINE PROTEIN: NORMAL

## 2020-08-12 PROCEDURE — 81002 URINALYSIS NONAUTO W/O SCOPE: CPT | Performed by: OBSTETRICS & GYNECOLOGY

## 2020-08-12 PROCEDURE — PNV: Performed by: OBSTETRICS & GYNECOLOGY

## 2020-08-12 PROCEDURE — 1111F DSCHRG MED/CURRENT MED MERGE: CPT | Performed by: OBSTETRICS & GYNECOLOGY

## 2020-08-12 RX ORDER — METOCLOPRAMIDE 5 MG/1
5 TABLET ORAL 4 TIMES DAILY
Qty: 20 TABLET | Refills: 2 | Status: SHIPPED | OUTPATIENT
Start: 2020-08-12 | End: 2020-09-02 | Stop reason: SDUPTHER

## 2020-08-12 NOTE — PATIENT INSTRUCTIONS
Pregnancy at [address] to [address]:   What changes are happening in my body? You are now at the end of your first trimester and entering your second trimester  Morning sickness usually goes away by this time  You may have other symptoms such as fatigue, frequent urination, and headaches  You may have gained between 2 to 4 pounds by now  How do I care for myself at this stage of my pregnancy? · Get plenty of rest   You may feel more tired than normal  You may need to take naps or go to bed earlier  · Manage nausea and vomiting  Avoid fatty and spicy foods  Eat small meals throughout the day instead of large meals  Venessa may help to decrease nausea  Ask your healthcare provider about other ways of decreasing nausea and vomiting  · Eat a variety of healthy foods  Healthy foods include fruits, vegetables, whole-grain breads, low-fat dairy foods, beans, lean meats, and fish  Drink liquids as directed  Ask how much liquid to drink each day and which liquids are best for you  Limit caffeine to less than 200 milligrams each day  Limit your intake of fish to 2 servings each week  Choose fish low in mercury such as canned light tuna, shrimp, salmon, cod, or tilapia  Do not  eat fish high in mercury such as swordfish, tilefish, cee mackerel, and shark  · Take prenatal vitamins as directed  Your need for certain vitamins and minerals, such as folic acid, increases during pregnancy  Prenatal vitamins provide some of the extra vitamins and minerals you need  Prenatal vitamins may also help to decrease the risk of certain birth defects  · Do not smoke  If you smoke, it is never too late to quit  Smoking increases your risk of a miscarriage and other health problems during your pregnancy  Smoking can cause your baby to be born too early or weigh less at birth  Ask your healthcare provider for information if you need help quitting  · Do not drink alcohol    Alcohol passes from your body to your baby through the placenta  It can affect your baby's brain development and cause fetal alcohol syndrome (FAS)  FAS is a group of conditions that causes mental, behavior, and growth problems  · Talk to your healthcare provider before you take any medicines  Many medicines may harm your baby if you take them when you are pregnant  Do not take any medicines, vitamins, herbs, or supplements without first talking to your healthcare provider  Never use illegal or street drugs (such as marijuana or cocaine) while you are pregnant  What are some safety tips during pregnancy? · Avoid hot tubs and saunas  Do not use a hot tub or sauna while you are pregnant, especially during your first trimester  Hot tubs and saunas may raise your baby's temperature and increase the risk of birth defects  · Avoid toxoplasmosis  This is an infection caused by eating raw meat or being around infected cat feces  It can cause birth defects, miscarriages, and other problems  Wash your hands after you touch raw meat  Make sure any meat is well-cooked before you eat it  Avoid raw eggs and unpasteurized milk  Use gloves or ask someone else to clean your cat's litter box while you are pregnant  What changes are happening with my baby? Your baby has fully formed fingernails and toenails  Your baby's heartbeat can now be heard  Ask your healthcare provider if you can listen to your baby's heartbeat  By week 14, your baby is over 4 inches long from the top of the head to the rump (baby's bottom)  Your baby weighs over 3 ounces  What do I need to know about prenatal care? During the first 28 weeks of your pregnancy, you will see your healthcare provider once a month  Prenatal care can help prevent problems during pregnancy and childbirth  Your healthcare provider will check your blood pressure and weight  You may also need any of the following:  · A urine test  may also be done to check for sugar and protein   These can be signs of gestational diabetes or infection  · Genetic disorders screening tests  may be offered to you  This screening test checks your baby's risk of genetic disorders such as Down syndrome  The screening test includes a blood test and ultrasound  · Your baby's heart rate  will be checked  When should I seek immediate care? · You have pain or cramping in your abdomen or low back  · You have heavy vaginal bleeding or clotting  · You pass material that looks like tissue or large clots  Collect the material and bring it with you  When should I contact my healthcare provider? · You cannot keep food or drinks down, and you are losing weight  · You have light bleeding  · You have chills or a fever  · You have vaginal itching, burning, or pain  · You have yellow, green, white, or foul-smelling vaginal discharge  · You have pain or burning when you urinate, less urine than usual, or pink or bloody urine  · You have questions or concerns about your condition or care  CARE AGREEMENT:   You have the right to help plan your care  Learn about your health condition and how it may be treated  Discuss treatment options with your caregivers to decide what care you want to receive  You always have the right to refuse treatment  The above information is an  only  It is not intended as medical advice for individual conditions or treatments  Talk to your doctor, nurse or pharmacist before following any medical regimen to see if it is safe and effective for you  © 2017 [employer] Information is for End User's use only and may not be sold, redistributed or otherwise used for commercial purposes  All illustrations and images included in CareNotes® are the copyrighted property of [employer]  or Patrice Danielle

## 2020-08-12 NOTE — PROGRESS NOTES
Patient was seen today for prenatal visit  1  10 6 weeks-fetal heart tones faintly heard, nonsustained pattern  Threatened AB precautions previously reviewed  Follow-up 4 weeks time prenatal visit  2  Hyperemesis-patient on Zofran pump, taking Reglan p o  With improved symptoms  She was hospitalized last week, has been doing better  She initially lost 27 lb over a few weeks time, only 1 lb weight loss over 2+ weeks  She is eating better, taking p o  Solids and fluids  She will continue with this regimen  Electronic prescription for Reglan sent  3  History of GERD-follows up with GI  She will continue Carafate and Protonix  She has follow-up with GI in the next few weeks  4  History of depression-continues on Pristiq  Recommend follow-up with treating doctor as needed  5  Possible hyperthyroidism-laboratory testing during hospitalization demonstrated decreased TSH with elevated free T4  Patient and mother state that she has had possible enlarged thyroid when she was younger  I did not appreciate that today  Repeat TSH along with free T4 and free T3 was given today  If still consistent with hyperthyroidism, would recommend follow-up with endocrinology  6  Anemia-hemoglobin 10 4 with latest blood draw  CBC to be done with prenatal labs which she was strongly encouraged to get done as soon as possible  She was given IV Venofer in the hospital   Consider p o  Iron when able to tolerate it

## 2020-08-18 LAB
ABO GROUP BLD: ABNORMAL
BASOPHILS # BLD AUTO: 0 X10E3/UL (ref 0–0.2)
BASOPHILS NFR BLD AUTO: 0 %
BLD GP AB SCN SERPL QL: NEGATIVE
EOSINOPHIL # BLD AUTO: 0.3 X10E3/UL (ref 0–0.4)
EOSINOPHIL NFR BLD AUTO: 4 %
ERYTHROCYTE [DISTWIDTH] IN BLOOD BY AUTOMATED COUNT: 16.7 % (ref 11.7–15.4)
HBV SURFACE AG SERPL QL IA: NEGATIVE
HCT VFR BLD AUTO: 35.1 % (ref 34–46.6)
HGB BLD-MCNC: 11.2 G/DL (ref 11.1–15.9)
IMM GRANULOCYTES # BLD: 0 X10E3/UL (ref 0–0.1)
IMM GRANULOCYTES NFR BLD: 0 %
LYMPHOCYTES # BLD AUTO: 1.6 X10E3/UL (ref 0.7–3.1)
LYMPHOCYTES NFR BLD AUTO: 22 %
MCH RBC QN AUTO: 24.5 PG (ref 26.6–33)
MCHC RBC AUTO-ENTMCNC: 31.9 G/DL (ref 31.5–35.7)
MCV RBC AUTO: 77 FL (ref 79–97)
MONOCYTES # BLD AUTO: 0.4 X10E3/UL (ref 0.1–0.9)
MONOCYTES NFR BLD AUTO: 6 %
NEUTROPHILS # BLD AUTO: 5 X10E3/UL (ref 1.4–7)
NEUTROPHILS NFR BLD AUTO: 68 %
PLATELET # BLD AUTO: 278 X10E3/UL (ref 150–450)
RBC # BLD AUTO: 4.57 X10E6/UL (ref 3.77–5.28)
RH BLD: POSITIVE
RPR SER QL: NON REACTIVE
RUBV IGG SERPL IA-ACNC: 2.83 INDEX
T3FREE SERPL-MCNC: 2.6 PG/ML (ref 2–4.4)
T4 FREE SERPL-MCNC: 1.31 NG/DL (ref 0.82–1.77)
TSH SERPL DL<=0.005 MIU/L-ACNC: 0.07 UIU/ML (ref 0.45–4.5)
WBC # BLD AUTO: 7.4 X10E3/UL (ref 3.4–10.8)

## 2020-08-20 ENCOUNTER — TELEPHONE (OUTPATIENT)
Dept: OBGYN CLINIC | Facility: CLINIC | Age: 20
End: 2020-08-20

## 2020-08-20 NOTE — TELEPHONE ENCOUNTER
Virginia Owens, nurse from [facility], called to report patient states she has 4 + [place] today  [facility] would like to re-start IV Zofran treatment    An order will be faxed for MD approval

## 2020-08-21 DIAGNOSIS — R79.89 LOW TSH LEVEL: Primary | ICD-10-CM

## 2020-08-24 ENCOUNTER — TELEPHONE (OUTPATIENT)
Dept: OBGYN CLINIC | Facility: CLINIC | Age: 20
End: 2020-08-24

## 2020-08-24 NOTE — TELEPHONE ENCOUNTER
Patient is being seen by MFM on Friday  Per Dr Lindsay Elliott, they will address her TSH at that time and she can cancel her endocrinology appointment on Sept 3rd  Patient was left a message regarding this

## 2020-08-25 ENCOUNTER — TELEPHONE (OUTPATIENT)
Dept: OBGYN CLINIC | Facility: CLINIC | Age: 20
End: 2020-08-25

## 2020-08-26 ENCOUNTER — TELEMEDICINE (OUTPATIENT)
Dept: GASTROENTEROLOGY | Facility: CLINIC | Age: 20
End: 2020-08-26
Payer: COMMERCIAL

## 2020-08-26 VITALS — WEIGHT: 178 LBS | BODY MASS INDEX: 28.61 KG/M2 | HEIGHT: 66 IN

## 2020-08-26 DIAGNOSIS — R11.10 HYPEREMESIS: Primary | ICD-10-CM

## 2020-08-26 DIAGNOSIS — Z3A.09 9 WEEKS GESTATION OF PREGNANCY: ICD-10-CM

## 2020-08-26 PROCEDURE — 99214 OFFICE O/P EST MOD 30 MIN: CPT | Performed by: NURSE PRACTITIONER

## 2020-08-26 PROCEDURE — 1036F TOBACCO NON-USER: CPT | Performed by: NURSE PRACTITIONER

## 2020-08-26 PROCEDURE — 1111F DSCHRG MED/CURRENT MED MERGE: CPT | Performed by: NURSE PRACTITIONER

## 2020-08-26 NOTE — TELEPHONE ENCOUNTER
Pt never went to the ER    her chest pains decreased    I advised her that if it happens again, she must go    She said that she is really hydrating and feels a lot better today

## 2020-08-26 NOTE — PATIENT INSTRUCTIONS
Continue Zofran pump  Continue Reglan every 6 hours as needed  Increase Carafate to 3-4 times daily as needed

## 2020-08-26 NOTE — PROGRESS NOTES
Virtual Regular Visit      Assessment/Plan:    Problem List Items Addressed This Visit        Digestive    Hyperemesis - Primary       Other    9 weeks gestation of pregnancy          1  Hyperemesis Gravidarum  She had been experiencing intractable nausea and vomiting for the past 8 weeks with a resultant 27 lb weight loss  She had a Zofran pump inserted and is receiving continuous Zofran with the option of bolus dosing  On Reglan 5 mg every 6 hours  She also received IV fluids via the visiting nurses on several occasions and most recent was hospitalized in the beginning of this month for dehydration  Since discharge from the hospital she has had no further vomiting and nausea has minimally improved  She has not lost any further weight but has not gained any weight either  Hopefully, in the next few weeks, her symptoms will improve during her 2nd trimester  - continue Zofran pump  - continue Reglan as needed    2  GERD   Longstanding GERD that has significantly worsened during pregnancy  She has undergone an upper endoscopy within the past several years that showed the absence of any significant findings  She has not noticed much improvement on double-dose PPI therapy and Carafate twice a day      Continue Protonix to twice daily dosing  Increase Carafate to 3-4 times daily     The patient was instructed that all medications need to be cleared by her obstetrician, Dr Daja Sequeira    2  13 weeks gestation         Reason for visit is   Chief Complaint   Patient presents with    Follow up-vomiting    Virtual Regular Visit        Encounter provider ANISA Pham    Provider located at [address] [address] [phone]      Recent Visits  No visits were found meeting these conditions     Showing recent visits within past 7 days and meeting all other requirements     Today's Visits  Date Type Provider Dept   08/26/20 Telemedicine ANISA [facility]   Showing today's visits and meeting all other requirements     Future Appointments  No visits were found meeting these conditions  Showing future appointments within next 150 days and meeting all other requirements        The patient was identified by name and date of birth  Dev Bustos was informed that this is a telemedicine visit and that the visit is being conducted through [facility] and patient was informed that this is not a secure, HIPAA-complaint platform  She agrees to proceed     My office door was closed  No one else was in the room  She acknowledged consent and understanding of privacy and security of the video platform  The patient has agreed to participate and understands they can discontinue the visit at any time  Patient is aware this is a billable service  Subjective  Dev Bustos is a 21 y o  female who had been experiencing intractable nausea and vomiting throughout most of her pregnancy  She currently is 13 weeks gestation  Vomiting so severe that she received several bags of IV fluids via visiting nursing, had an implantable Zofran pump placed and was recently admitted to the hospital for 3-4 days in the beginning of this month for IV fluids as she was becoming extremely dehydrated  Denies further vomiting since discharge from the hospital but remains nauseated  She is able to tolerate oral intake now  No weight gain  Does admit to constipation  Denies melena, rectal bleeding, fevers or chills  Crystal Prows       HPI     Past Medical History:   Diagnosis Date    Abnormal ECG     no diagnosis    Anemia     Anxiety     Asthma     Depression     Endometriosis     Frequent sinus infections     GERD (gastroesophageal reflux disease)     Migraine     Miscarriage     Ovarian cyst     Syncope     Trauma     Varicella     vaccine       Past Surgical History:   Procedure Laterality Date    TONSILLECTOMY  2006    UPPER GASTROINTESTINAL ENDOSCOPY  05/18/2017    Dysphagia, dilated to 47 [facility]   UPPER GASTROINTESTINAL ENDOSCOPY  10/07/2016    Dysphagia, dilated to 47 [facility]  Biopsies negative for eosinophilic esophagitis    UPPER GASTROINTESTINAL ENDOSCOPY  02/22/2018    Dysphagia, dilated to 64 [facility]    Biopsies negative for eosinophilic esophagitis    WISDOM TOOTH EXTRACTION       Family History   Problem Relation Age of Onset    Hypertension Mother    Earna Iveth Arthritis Mother     Colon polyps Mother     Hyperlipidemia Father     Hypertension Maternal Grandfather     Stroke Maternal Grandfather     Myasthenia gravis Maternal Grandfather     Diabetes Paternal Grandfather     Stroke Paternal Grandfather     Cancer Paternal Grandfather     Bipolar disorder Sister     Suicide Attempts Sister     Mental illness Other     Colon cancer Other     Thyroid disease Maternal Grandmother     Colon polyps Maternal Grandmother     Bipolar disorder Paternal Aunt     Drug abuse Paternal Aunt     Eating disorder Paternal Aunt     Lung cancer Maternal Uncle     Substance Abuse Neg Hx      Current Outpatient Medications   Medication Sig Dispense Refill    albuterol (PROVENTIL HFA,VENTOLIN HFA) 90 mcg/act inhaler Inhale 2 puffs      desvenlafaxine succinate (PRISTIQ) 50 mg 24 hr tablet Take 100 mg by mouth daily       metoclopramide (REGLAN) 5 mg tablet Take 1 tablet (5 mg total) by mouth 4 (four) times a day 20 tablet 2    ondansetron (ZOFRAN-ODT) 4 mg disintegrating tablet Take 1 tablet (4 mg total) by mouth every 6 (six) hours as needed for nausea or vomiting 40 tablet 0    pantoprazole (PROTONIX) 40 mg tablet Take 1 tablet (40 mg total) by mouth 2 (two) times a day 90 tablet 3    Prenatal Multivit-Min-Fe-FA (PRENATAL 1 + IRON PO) Take by mouth daily      sertraline (ZOLOFT) 50 mg tablet Take 50 mg by mouth daily      sucralfate (CARAFATE) 1 g tablet Take 1 tablet (1 g total) by mouth 2 (two) times a day 180 tablet 3    fluticasone (FLONASE) 50 mcg/act nasal spray 2 sprays into each nostril daily (Patient not taking: Reported on 7/28/2020) 16 g 0     No current facility-administered medications for this visit  Allergies   Allergen Reactions    Strawberry C [Ascorbate] Anaphylaxis    Mold Extract [Trichophyton]     Cat Hair Extract        REVIEW OF SYSTEMS:    CONSTITUTIONAL: Denies any fever, chills, rigors, and weight loss  HEENT: No earache or tinnitus  Denies hearing loss or visual disturbances  CARDIOVASCULAR: No chest pain or palpitations  RESPIRATORY: Denies any cough, hemoptysis, shortness of breath or dyspnea on exertion  GASTROINTESTINAL: As noted in the History of Present Illness  GENITOURINARY: No problems with urination  Denies any hematuria or dysuria  NEUROLOGIC: No dizziness or vertigo, denies headaches  MUSCULOSKELETAL: Denies any muscle or joint pain  SKIN: Denies skin rashes or itching  ENDOCRINE: Denies excessive thirst  Denies intolerance to heat or cold  PSYCHOSOCIAL: Denies depression or anxiety  Denies any recent memory loss  Video Exam    Vitals:    08/26/20 1002   Weight: 80 7 kg (178 lb)   Height: 5' 5 5" (1 664 m)       General: appears well, no acute distress   HENT: Normocephalic  Neck:  Appears normal  Lungs: Equal chest rise and unlabored breathing   Neuro: AAOx3, follow commands   Psych: cooperative, normal affect   Skin: No jaundice, rashes, or lesions on face        I spent 15 minutes directly with the patient during this visit      Chavez Marks acknowledges that she has consented to an online visit or consultation  She understands that the online visit is based solely on information provided by her, and that, in the absence of a face-to-face physical evaluation by the physician, the diagnosis she receives is both limited and provisional in terms of accuracy and completeness   This is not intended to replace a full medical face-to-face evaluation by the physician  Jim Armenta understands and accepts these terms

## 2020-08-27 ENCOUNTER — TELEPHONE (OUTPATIENT)
Dept: PERINATAL CARE | Facility: CLINIC | Age: 20
End: 2020-08-27

## 2020-08-27 NOTE — TELEPHONE ENCOUNTER
Spoke with patient and confirmed appointment with MFM  1 support person ( must be over age of 15) may accompany patient  Will you and your support person be able to wear a mask ,without a valve , during entire appointment? yes   To minimize your exposure in our waiting area,check in and rooming questions will be done via phone  When you arrive in the parking lot please call the following inside line # prior to entering office:    Roro Barrett [phone] line: [facility] line:  [phone] line: [phone]  [place] Cho line:  [phone]  [place] line: [phone]    Have you or your support person traveled outside the state in the last 2 weeks?no   If yes, what state did you travel to? no     Do you or your support person have:  Fever or flu- like symptoms?no  Symptoms of upper respiratory infection like runny nose, sore throat or cough? no  Do you have new headache that you have not had in the past?no  Have you experienced any new shortness of breath recently?no  Do you have any new loss of taste or smell?no  Do you have any new diarrhea, nausea or vomiting?no  Have you recently been in contact with anyone who has been sick or diagnosed with COVID-19 infection?no  Have you been recommended to quarantine because of an exposure to a confirmed positive COVID19 person?no  You and your support person will have temperature screening upon arrival   Patient verbalized understanding of all instructions

## 2020-08-28 ENCOUNTER — ROUTINE PRENATAL (OUTPATIENT)
Dept: PERINATAL CARE | Facility: CLINIC | Age: 20
End: 2020-08-28
Payer: COMMERCIAL

## 2020-08-28 ENCOUNTER — HOSPITAL ENCOUNTER (EMERGENCY)
Facility: HOSPITAL | Age: 20
Discharge: HOME | End: 2020-08-28
Attending: EMERGENCY MEDICINE | Admitting: EMERGENCY MEDICINE
Payer: COMMERCIAL

## 2020-08-28 VITALS
WEIGHT: 177.6 LBS | HEIGHT: 66 IN | HEART RATE: 108 BPM | SYSTOLIC BLOOD PRESSURE: 112 MMHG | TEMPERATURE: 98.2 F | BODY MASS INDEX: 28.54 KG/M2 | DIASTOLIC BLOOD PRESSURE: 70 MMHG

## 2020-08-28 VITALS
DIASTOLIC BLOOD PRESSURE: 56 MMHG | WEIGHT: 177.69 LBS | OXYGEN SATURATION: 99 % | BODY MASS INDEX: 29.12 KG/M2 | TEMPERATURE: 97.6 F | SYSTOLIC BLOOD PRESSURE: 105 MMHG | HEART RATE: 81 BPM

## 2020-08-28 DIAGNOSIS — Z82.49 FAMILY HISTORY OF THROMBOEMBOLIC DISEASE: ICD-10-CM

## 2020-08-28 DIAGNOSIS — O21.0 HYPEREMESIS ARISING DURING PREGNANCY: ICD-10-CM

## 2020-08-28 DIAGNOSIS — Z36.9 ANTENATAL SCREENING ENCOUNTER: ICD-10-CM

## 2020-08-28 DIAGNOSIS — R53.1 GENERALIZED WEAKNESS: Primary | ICD-10-CM

## 2020-08-28 DIAGNOSIS — Z36.82 ENCOUNTER FOR ANTENATAL SCREENING FOR NUCHAL TRANSLUCENCY: Primary | ICD-10-CM

## 2020-08-28 LAB
ALBUMIN SERPL BCP-MCNC: 3.5 G/DL (ref 3.5–5)
ALP SERPL-CCNC: 55 U/L (ref 46–116)
ALT SERPL W P-5'-P-CCNC: 15 U/L (ref 12–78)
ANION GAP SERPL CALCULATED.3IONS-SCNC: 10 MMOL/L (ref 4–13)
AST SERPL W P-5'-P-CCNC: 10 U/L (ref 5–45)
BASOPHILS # BLD AUTO: 0.03 THOUSANDS/ΜL (ref 0–0.1)
BASOPHILS NFR BLD AUTO: 0 % (ref 0–1)
BILIRUB DIRECT SERPL-MCNC: 0.06 MG/DL (ref 0–0.2)
BILIRUB SERPL-MCNC: 0.34 MG/DL (ref 0.2–1)
BUN SERPL-MCNC: 5 MG/DL (ref 5–25)
CALCIUM SERPL-MCNC: 8.8 MG/DL (ref 8.3–10.1)
CHLORIDE SERPL-SCNC: 101 MMOL/L (ref 100–108)
CO2 SERPL-SCNC: 23 MMOL/L (ref 21–32)
CREAT SERPL-MCNC: 0.63 MG/DL (ref 0.6–1.3)
EOSINOPHIL # BLD AUTO: 0.25 THOUSAND/ΜL (ref 0–0.61)
EOSINOPHIL NFR BLD AUTO: 4 % (ref 0–6)
ERYTHROCYTE [DISTWIDTH] IN BLOOD BY AUTOMATED COUNT: 16.8 % (ref 11.6–15.1)
EXT PREG TEST URINE: POSITIVE
EXT. CONTROL ED NAV: ABNORMAL
GFR SERPL CREATININE-BSD FRML MDRD: 130 ML/MIN/1.73SQ M
GLUCOSE SERPL-MCNC: 74 MG/DL (ref 65–140)
HCT VFR BLD AUTO: 35.4 % (ref 34.8–46.1)
HGB BLD-MCNC: 10.8 G/DL (ref 11.5–15.4)
IMM GRANULOCYTES # BLD AUTO: 0.03 THOUSAND/UL (ref 0–0.2)
IMM GRANULOCYTES NFR BLD AUTO: 0 % (ref 0–2)
LIPASE SERPL-CCNC: 116 U/L (ref 73–393)
LYMPHOCYTES # BLD AUTO: 1.5 THOUSANDS/ΜL (ref 0.6–4.47)
LYMPHOCYTES NFR BLD AUTO: 22 % (ref 14–44)
MAGNESIUM SERPL-MCNC: 1.6 MG/DL (ref 1.6–2.6)
MCH RBC QN AUTO: 24.7 PG (ref 26.8–34.3)
MCHC RBC AUTO-ENTMCNC: 30.5 G/DL (ref 31.4–37.4)
MCV RBC AUTO: 81 FL (ref 82–98)
MONOCYTES # BLD AUTO: 0.48 THOUSAND/ΜL (ref 0.17–1.22)
MONOCYTES NFR BLD AUTO: 7 % (ref 4–12)
NEUTROPHILS # BLD AUTO: 4.55 THOUSANDS/ΜL (ref 1.85–7.62)
NEUTS SEG NFR BLD AUTO: 67 % (ref 43–75)
NRBC BLD AUTO-RTO: 0 /100 WBCS
PLATELET # BLD AUTO: 226 THOUSANDS/UL (ref 149–390)
PMV BLD AUTO: 9.4 FL (ref 8.9–12.7)
POTASSIUM SERPL-SCNC: 3.7 MMOL/L (ref 3.5–5.3)
PROT SERPL-MCNC: 7.1 G/DL (ref 6.4–8.2)
RBC # BLD AUTO: 4.38 MILLION/UL (ref 3.81–5.12)
SODIUM SERPL-SCNC: 134 MMOL/L (ref 136–145)
TSH SERPL DL<=0.05 MIU/L-ACNC: 0.35 UIU/ML (ref 0.46–3.98)
WBC # BLD AUTO: 6.84 THOUSAND/UL (ref 4.31–10.16)

## 2020-08-28 PROCEDURE — 96365 THER/PROPH/DIAG IV INF INIT: CPT

## 2020-08-28 PROCEDURE — 80048 BASIC METABOLIC PNL TOTAL CA: CPT | Performed by: EMERGENCY MEDICINE

## 2020-08-28 PROCEDURE — 99285 EMERGENCY DEPT VISIT HI MDM: CPT | Performed by: EMERGENCY MEDICINE

## 2020-08-28 PROCEDURE — 1036F TOBACCO NON-USER: CPT | Performed by: OBSTETRICS & GYNECOLOGY

## 2020-08-28 PROCEDURE — 93005 ELECTROCARDIOGRAM TRACING: CPT

## 2020-08-28 PROCEDURE — 96366 THER/PROPH/DIAG IV INF ADDON: CPT

## 2020-08-28 PROCEDURE — 76813 OB US NUCHAL MEAS 1 GEST: CPT | Performed by: OBSTETRICS & GYNECOLOGY

## 2020-08-28 PROCEDURE — 36415 COLL VENOUS BLD VENIPUNCTURE: CPT | Performed by: EMERGENCY MEDICINE

## 2020-08-28 PROCEDURE — 85025 COMPLETE CBC W/AUTO DIFF WBC: CPT | Performed by: EMERGENCY MEDICINE

## 2020-08-28 PROCEDURE — 99242 OFF/OP CONSLTJ NEW/EST SF 20: CPT | Performed by: OBSTETRICS & GYNECOLOGY

## 2020-08-28 PROCEDURE — 80076 HEPATIC FUNCTION PANEL: CPT | Performed by: EMERGENCY MEDICINE

## 2020-08-28 PROCEDURE — 84443 ASSAY THYROID STIM HORMONE: CPT | Performed by: EMERGENCY MEDICINE

## 2020-08-28 PROCEDURE — 81025 URINE PREGNANCY TEST: CPT | Performed by: EMERGENCY MEDICINE

## 2020-08-28 PROCEDURE — 83735 ASSAY OF MAGNESIUM: CPT | Performed by: EMERGENCY MEDICINE

## 2020-08-28 PROCEDURE — 99285 EMERGENCY DEPT VISIT HI MDM: CPT

## 2020-08-28 PROCEDURE — 83690 ASSAY OF LIPASE: CPT | Performed by: EMERGENCY MEDICINE

## 2020-08-28 PROCEDURE — 3008F BODY MASS INDEX DOCD: CPT | Performed by: OBSTETRICS & GYNECOLOGY

## 2020-08-28 RX ORDER — ONDANSETRON HYDROCHLORIDE 4 MG/5ML
SOLUTION ORAL ONCE
COMMUNITY
End: 2021-03-03 | Stop reason: HOSPADM

## 2020-08-28 RX ADMIN — DEXTROSE AND SODIUM CHLORIDE 1000 ML: 5; .9 INJECTION, SOLUTION INTRAVENOUS at 14:34

## 2020-08-28 NOTE — PROGRESS NOTES
Lee Hutchinson: Ms Yuliet Lei was seen today at 13w1d for nuchal translucency ultrasound  See ultrasound report under "OB Procedures" tab  Consultation is as follows:    Ms Yuliet Lei is here today for nuchal translucency  She has had a difficult first trimester, with hospital admission and diagnosis of hyperemesis for which she is on a Zofran pump, getting continuous infusion as well as occasional bolus doses  She indicates a weight loss of 32 lb  With the Zofran pump she is able to hydrate though is forcefully consuming 64 oz of water per day but can tolerate some bland foods as well as a ground up prenatal vitamin that has no iron  She has profound constipation, experiencing a bowel movement only once per week, and she is taking Colace 2-3 times per day as well as glycerin suppositories  Her nausea is requiring Reglan every 6 hours and she is also on Protonix and Carafate  Today she notes progressive lower extremity weakness, most notably in her feet, with numbness on her feet and along the medial aspects of her legs, as well as body aches, without fevers or chills  She has a negative Coronavirus review of systems  Past medical history was reviewed using an office screening tool and is notable for hyperemesis as above as well as acid reflux  Past surgical history is negative  She denies current use of alcohol, drugs of abuse, tobacco, and marijuana products  Medications and allergies are up to date in [place]  Family history per our office screening tool includes her mother with preeclampsia, and a strong family history of factor 5 Leiden mutation most prominently in her maternal aunts family, but is otherwise noncontributory  Her first pregnancy resulted in miscarriage in May of 2019  On examination today, she appears well-developed, in no acute distress, with vitals documented above  She is awake, alert and oriented x3  Mood and affect are appropriate    Skin is intact without rashes or lesions  Pupils are equal and round; sclera are anicteric; extraocular movements are symmetric  Dear Dr Lian Thomson,    Thank you very much for your kind referral of patient Keiry Magaña  Her history is as above  Ultrasound findings today are as follows: There is a single live intrauterine pregnancy with size equivalent to dates  No gross anomalies were identified on limited views  Amniotic fluid is within normal limits  Nuchal translucency measures 1 4mm which is <95th percentile for this crown-rump length  No uterine, adnexal, or early placental abnormalities are identified  Evaluation and Management:The patient was counseled regarding the above findings  My recommendations are as follows:     1  The limitations of ultrasound and aneuploidy screening were explained to her  Genetic screening and diagnostic testing options were discussed with her  Blood work was drawn today for Sequential Screen  She should return in 7 weeks for anatomic survey and cervical length screening  2   I advise against treating her as hyperthyroidism  While low TSH level with a high free T4 level is characteristic of overt hyperthyroidism, transient biochemical features of hyperthyroidism are in 2-15% of women in early pregnancy, and many women with hyperemesis have high serum T4 levels and low TSH levels, resulting from TSH receptor stimulation from high concentrations of hCG  This physiologic hyperthyroidism also is known as gestational transient hyperthyroidism, which is rarely symptomatic and should not be treated with thioamide drugs  Women with hyperemesis and abnormal TFTs can be expectantly managed as this usually leads to reduced free T4 as hCG levels decrease after the first trimester, though note that the TSH can remain suppressed for weeks after free T4 normalizes        3   Her neurologic complaints are 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 and hyperemesis  I recommend therefore evaluation with Neurology today  Labor and delivery team was notified for her emergency department arrival       4   Given strong family history of factor V Leiden mutation in this patient with hyperemesis, which is a known risk factors for VTE, factor V Leiden testing was ordered for her today  5   With regards to management of her hyperemesis I recommend avoidance of total parental nutrition given risk of maternal medical complication such as sepsis and thromboembolic events  If her current regimen fails, gastric feeding can be considered  6   In the setting of 2 moderate risk factors for preeclampsia (nulliparity, first degree relative), I recommended consideration of initiation of low-dose aspirin daily for the prevention of preeclampsia, per ACOG committee opinion #569  This prophylactic medication is more effective when started prior to 16 weeks but can be started as late as 28 weeks  We are now advising 162mg as a way to approach a dose >=100mg and 150mg given that several studies support a higher dose of aspirin than 81mg (PMIDs [id number], [id number], and [id number]) however given her GERD I advised only the 81mg dose, which should be discontinued if it worsens her reflux  At the conclusion of today's encounter, all questions were answered to her satisfaction  Thank you very much for this kind referral and please do not hesitate to contact me with any further questions or concerns

## 2020-08-28 NOTE — PATIENT INSTRUCTIONS
Thank you for choosing us for your  care today  If you have any questions about your ultrasound or care, please do not hesitate to contact us or your primary obstetrician  Please begin taking aspirin 81mg daily for the prevention of preeclampsia  If you have asthma and notice an increase in symptom frequency or severity, consider stopping this medication  Please go to [address] ED now for evaluation  Some general instructions for your pregnancy are:     Exercise: Aim for 22 minutes per day (150 minutes per week!) of regular exercise  This is obviously hard to do during a pandemic but walking is great   Nutrition: aim for calcium-rich and iron-rich foods as well as healthy sources of protein  This will help you gain a healthy amount of weight   Protect against the flu: get yourself and your entire household vaccinated against influenza   Protect against coronavirus: practice social distancing, wear a mask in public, and wash your hands often  This virus can be spread easily by people without symptoms  Notify your primary care doctor if you have any symptoms including cough, shortness of breath or difficulty breathing, fever, chills, muscle pain, sore throat, or new loss of taste or smell   Learn about Preeclampsia: preeclampsia is a common, serious complication in pregnancy  A blood pressure of 140mmHg (top number or systolic) OR 76NCTS (bottom number or diastolic) is not normal and needs evaluation by your doctor   If you smoke, try to reduce how many cigarettes you smoke or quit completely  Do not vape   Other warning signs to watch out for in pregnancy or postpartum: chest pain, obstructed breathing or shortness of breath, seizures, thoughts of hurting yourself or your baby, bleeding, a painful or swollen leg, fever, or headache ([facility] POST-BIRTH Warning Signs campaign)  If these happen call 911    Itching is also not normal in pregnancy and if you experience this, especially over your hands and feet, potentially worse at night, notify your doctors

## 2020-08-28 NOTE — LETTER
August 28, 2020     Ashley Sherman MD  [address] [facility]    Patient: Emely Marroquin   YOB: 2000   Date of Visit: 8/28/2020       Dear Dr Oral Simmons: Thank you for referring Meghan Noriega to me for evaluation  Below are my notes for this consultation  If you have questions, please do not hesitate to call me  I look forward to following your patient along with you  Sincerely,        Mesha Oliveira MD        CC: DO Mesha Denny MD  8/28/2020 12:27 PM  Sign when Signing Visit  [facility]: Ms Rose Marie Michael was seen today at 13w1d for nuchal translucency ultrasound  See ultrasound report under "OB Procedures" tab  Consultation is as follows:    Ms Rose Marie Michael is here today for nuchal translucency  She has had a difficult first trimester, with hospital admission and diagnosis of hyperemesis for which she is on a Zofran pump, getting continuous infusion as well as occasional bolus doses  She indicates a weight loss of 32 lb  With the Zofran pump she is able to hydrate though is forcefully consuming 64 oz of water per day but can tolerate some bland foods as well as a ground up prenatal vitamin that has no iron  She has profound constipation, experiencing a bowel movement only once per week, and she is taking Colace 2-3 times per day as well as glycerin suppositories  Her nausea is requiring Reglan every 6 hours and she is also on Protonix and Carafate  Today she notes progressive lower extremity weakness, most notably in her feet, with numbness on her feet and along the medial aspects of her legs, as well as body aches, without fevers or chills  She has a negative Coronavirus review of systems  Past medical history was reviewed using an office screening tool and is notable for hyperemesis as above as well as acid reflux  Past surgical history is negative    She denies current use of alcohol, drugs of abuse, tobacco, and marijuana products  Medications and allergies are up to date in [address]  Family history per our office screening tool includes her mother with preeclampsia, and a strong family history of factor 5 Leiden mutation most prominently in her maternal aunts family, but is otherwise noncontributory  Her first pregnancy resulted in miscarriage in May of 2019  On examination today, she appears well-developed, in no acute distress, with vitals documented above  She is awake, alert and oriented x3  Mood and affect are appropriate  Skin is intact without rashes or lesions  Pupils are equal and round; sclera are anicteric; extraocular movements are symmetric  Dear Dr Barbee Frankel,    Thank you very much for your kind referral of patient Cristal Mitchell  Her history is as above  Ultrasound findings today are as follows: There is a single live intrauterine pregnancy with size equivalent to dates  No gross anomalies were identified on limited views  Amniotic fluid is within normal limits  Nuchal translucency measures 1 4mm which is <95th percentile for this crown-rump length  No uterine, adnexal, or early placental abnormalities are identified  Evaluation and Management:The patient was counseled regarding the above findings  My recommendations are as follows:     1  The limitations of ultrasound and aneuploidy screening were explained to her  Genetic screening and diagnostic testing options were discussed with her  Blood work was drawn today for Sequential Screen  She should return in 7 weeks for anatomic survey and cervical length screening  2   I advise against treating her as hyperthyroidism    While low TSH level with a high free T4 level is characteristic of overt hyperthyroidism, transient biochemical features of hyperthyroidism are in 2-15% of women in early pregnancy, and many women with hyperemesis have high serum T4 levels and low TSH levels, resulting from TSH receptor stimulation from high concentrations of hCG  This physiologic hyperthyroidism also is known as gestational transient hyperthyroidism, which is rarely symptomatic and should not be treated with thioamide drugs  Women with hyperemesis and abnormal TFTs can be expectantly managed as this usually leads to reduced free T4 as hCG levels decrease after the first trimester, though note that the TSH can remain suppressed for weeks after free T4 normalizes  3   Her neurologic complaints are 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 and hyperemesis  I recommend therefore evaluation with Neurology today  Labor and delivery team was notified for her emergency department arrival       4   Given strong family history of factor V Leiden mutation in this patient with hyperemesis, which is a known risk factors for VTE, factor V Leiden testing was ordered for her today  5   With regards to management of her hyperemesis I recommend avoidance of total parental nutrition given risk of maternal medical complication such as sepsis and thromboembolic events  If her current regimen fails, gastric feeding can be considered  6   In the setting of 2 moderate risk factors for preeclampsia (nulliparity, first degree relative), I recommended consideration of initiation of low-dose aspirin daily for the prevention of preeclampsia, per ACOG committee opinion #429  This prophylactic medication is more effective when started prior to 16 weeks but can be started as late as 28 weeks  We are now advising 162mg as a way to approach a dose >=100mg and 150mg given that several studies support a higher dose of aspirin than 81mg (PMIDs [id number], [id number], and [id number]) however given her GERD I advised only the 81mg dose, which should be discontinued if it worsens her reflux  At the conclusion of today's encounter, all questions were answered to her satisfaction    Thank you very much for this kind referral and please do not hesitate to contact me with any further questions or concerns

## 2020-08-28 NOTE — PROGRESS NOTES
Pt seen in ED  She reports symptom improvement  Just feels some swelling on her feet  Nausea is improving  No OB complaints (denies abdominal pain or vaginal bleeding)  Pt feels comfortable going home and following up w/ her OB 9/11  Dr Leesa Greenwood aware      Vitals:    08/28/20 1358   BP: 113/63   BP Location: Right arm   Pulse: 80   Temp: 97 6 °F (36 4 °C)   TempSrc: Temporal   SpO2: 99%   Weight: 80 6 kg (177 lb 11 1 oz)       Recent Results (from the past 12 hour(s))   CBC and differential    Collection Time: 08/28/20  2:31 PM   Result Value Ref Range    WBC 6 84 4 31 - 10 16 Thousand/uL    RBC 4 38 3 81 - 5 12 Million/uL    Hemoglobin 10 8 (L) 11 5 - 15 4 g/dL    Hematocrit 35 4 34 8 - 46 1 %    MCV 81 (L) 82 - 98 fL    MCH 24 7 (L) 26 8 - 34 3 pg    MCHC 30 5 (L) 31 4 - 37 4 g/dL    RDW 16 8 (H) 11 6 - 15 1 %    MPV 9 4 8 9 - 12 7 fL    Platelets 383 904 - 511 Thousands/uL    nRBC 0 /100 WBCs    Neutrophils Relative 67 43 - 75 %    Immat GRANS % 0 0 - 2 %    Lymphocytes Relative 22 14 - 44 %    Monocytes Relative 7 4 - 12 %    Eosinophils Relative 4 0 - 6 %    Basophils Relative 0 0 - 1 %    Neutrophils Absolute 4 55 1 85 - 7 62 Thousands/µL    Immature Grans Absolute 0 03 0 00 - 0 20 Thousand/uL    Lymphocytes Absolute 1 50 0 60 - 4 47 Thousands/µL    Monocytes Absolute 0 48 0 17 - 1 22 Thousand/µL    Eosinophils Absolute 0 25 0 00 - 0 61 Thousand/µL    Basophils Absolute 0 03 0 00 - 0 10 Thousands/µL   Basic metabolic panel    Collection Time: 08/28/20  2:31 PM   Result Value Ref Range    Sodium 134 (L) 136 - 145 mmol/L    Potassium 3 7 3 5 - 5 3 mmol/L    Chloride 101 100 - 108 mmol/L    CO2 23 21 - 32 mmol/L    ANION GAP 10 4 - 13 mmol/L    BUN 5 5 - 25 mg/dL    Creatinine 0 63 0 60 - 1 30 mg/dL    Glucose 74 65 - 140 mg/dL    Calcium 8 8 8 3 - 10 1 mg/dL    eGFR 130 ml/min/1 73sq m   Hepatic function panel    Collection Time: 08/28/20  2:31 PM   Result Value Ref Range    Total Bilirubin 0 34 0 20 - 1 00 mg/dL Bilirubin, Direct 0 06 0 00 - 0 20 mg/dL    Alkaline Phosphatase 55 46 - 116 U/L    AST 10 5 - 45 U/L    ALT 15 12 - 78 U/L    Total Protein 7 1 6 4 - 8 2 g/dL    Albumin 3 5 3 5 - 5 0 g/dL   TSH    Collection Time: 08/28/20  2:31 PM   Result Value Ref Range    TSH 3RD GENERATON 0 351 (L) 0 463 - 3 980 uIU/mL   Magnesium    Collection Time: 08/28/20  2:31 PM   Result Value Ref Range    Magnesium 1 6 1 6 - 2 6 mg/dL   Lipase    Collection Time: 08/28/20  2:31 PM   Result Value Ref Range    Lipase 116 73 - 393 u/L   POCT pregnancy, urine    Collection Time: 08/28/20  2:59 PM   Result Value Ref Range    EXT PREG TEST UR (Ref: Negative) positive     Control valid          Berry Gutierrez MD  OB/GYN PGY-3  8/28/2020  3:51 PM

## 2020-08-28 NOTE — DISCHARGE INSTRUCTIONS
Continue your outpatient management of your hyperemesis gravidarum  Make sure to drink lots of fluids to avoid dehydration  Call Dr Oral Simmons on Monday, you should be seen back in the office for further management during your pregnancy

## 2020-08-28 NOTE — ED PROVIDER NOTES
History  Chief Complaint   Patient presents with    Weakness - Generalized     states with 13 weeks pregant was sent here by maternal fetal medicine for generlized musculeachesm, "head rushes" "feels like I am going to pass out"     22 YO female, 13 weeks pregnant, presents with increased weakness, predominantly lower extremities, intermittent lightheadedness  Pt states this has been worsening over the last week  She notes Hx of hyperemesis gravidarum, has lost ~32 pounds during the pregnancy  She is currently on Zofran gtt with reglan and states this has been working for her as she has been tolerating fluids, eating bland food daily  She states she will have multiple episodes of lightheadedness throughout the day, this has been associated with palpitations (fast heartbeat), and she describes a rushing sensation like she is going to pass out  She denies associated chest pain during these episodes  She states she has had similar in the past but these have been becoming more frequent during her pregnancy  She states she has has had tingling with numbness in the B/L LE's, primarily in the medial aspect of the legs B/L, feels loss of strength in the LE's as well  She has been able to ambulate  Pt denies CP/SOB/F/C/D/C, no dysuria, burning on urination or blood in urine  History provided by:  Patient and parent   used: No    Fatigue   Severity:  Moderate  Onset quality:  Gradual  Duration:  4 weeks  Timing:  Constant  Progression:  Waxing and waning  Chronicity:  New  Relieved by:  Nothing  Worsened by:  Nothing  Associated symptoms: nausea    Associated symptoms: no abdominal pain, no arthralgias, no chest pain, no diarrhea, no dizziness, no dysuria, no fever, no frequency, no shortness of breath and no vomiting        Prior to Admission Medications   Prescriptions Last Dose Informant Patient Reported? Taking?    Prenatal Multivit-Min-Fe-FA (PRENATAL 1 + IRON PO)  Self Yes Yes   Sig: Take by mouth daily   albuterol (PROVENTIL HFA,VENTOLIN HFA) 90 mcg/act inhaler  Self Yes Yes   Sig: Inhale 2 puffs   desvenlafaxine succinate (PRISTIQ) 50 mg 24 hr tablet  Self Yes Yes   Sig: Take 50 mg by mouth daily    fluticasone (FLONASE) 50 mcg/act nasal spray  Self No Yes   Si sprays into each nostril daily   metoclopramide (REGLAN) 5 mg tablet  Self No Yes   Sig: Take 1 tablet (5 mg total) by mouth 4 (four) times a day   ondansetron (ZOFRAN) 4 MG/5ML solution  Self Yes Yes   Sig: Take by mouth once   ondansetron (ZOFRAN-ODT) 4 mg disintegrating tablet  Self No Yes   Sig: Take 1 tablet (4 mg total) by mouth every 6 (six) hours as needed for nausea or vomiting   pantoprazole (PROTONIX) 40 mg tablet  Self No Yes   Sig: Take 1 tablet (40 mg total) by mouth 2 (two) times a day   sertraline (ZOLOFT) 50 mg tablet  Self Yes Yes   Sig: Take 50 mg by mouth daily   sucralfate (CARAFATE) 1 g tablet  Self No Yes   Sig: Take 1 tablet (1 g total) by mouth 2 (two) times a day      Facility-Administered Medications: None       Past Medical History:   Diagnosis Date    Abnormal ECG     no diagnosis    Anemia     Anxiety     Asthma     Depression     Endometriosis     Frequent sinus infections     GERD (gastroesophageal reflux disease)     Migraine     Miscarriage     Ovarian cyst     Syncope     Trauma     Varicella     vaccine       Past Surgical History:   Procedure Laterality Date    TONSILLECTOMY  2006    UPPER GASTROINTESTINAL ENDOSCOPY  2017    Dysphagia, dilated to 47 [facility]   UPPER GASTROINTESTINAL ENDOSCOPY  10/07/2016    Dysphagia, dilated to 47 [facility]  Biopsies negative for eosinophilic esophagitis    UPPER GASTROINTESTINAL ENDOSCOPY  2018    Dysphagia, dilated to 64 [facility]    Biopsies negative for eosinophilic esophagitis    WISDOM TOOTH EXTRACTION         Family History   Problem Relation Age of Onset    Hypertension Mother    [de-identified] Arthritis Mother     Colon polyps Mother    Pricila Saleh Hyperlipidemia Father     Hypertension Maternal Grandfather     Stroke Maternal Grandfather     Myasthenia gravis Maternal Grandfather     Diabetes Paternal Grandfather     Stroke Paternal Grandfather     Cancer Paternal Grandfather     Bipolar disorder Sister     Suicide Attempts Sister     Mental illness Other     Colon cancer Other     Thyroid disease Maternal Grandmother     Colon polyps Maternal Grandmother     Bipolar disorder Paternal Aunt     Drug abuse Paternal Aunt     Eating disorder Paternal Emrliliana Kalata     Lung cancer Maternal Uncle     Substance Abuse Neg Hx      I have reviewed and agree with the history as documented  E-Cigarette/Vaping    E-Cigarette Use Never User      E-Cigarette/Vaping Substances    Nicotine No     THC No     CBD No     Flavoring No     Other No     Unknown No      Social History     Tobacco Use    Smoking status: Never Smoker    Smokeless tobacco: Never Used   Substance Use Topics    Alcohol use: Not Currently     Comment: social    Drug use: Not Currently     Types: Marijuana     Comment: last time a year ago        Review of Systems   Constitutional: Positive for fatigue  Negative for chills and fever  HENT: Negative for dental problem  Eyes: Negative for visual disturbance  Respiratory: Negative for shortness of breath  Cardiovascular: Negative for chest pain  Gastrointestinal: Positive for nausea  Negative for abdominal pain, diarrhea and vomiting  Genitourinary: Negative for dysuria and frequency  Musculoskeletal: Negative for arthralgias  Skin: Negative for rash  Neurological: Negative for dizziness, weakness and light-headedness  Psychiatric/Behavioral: Negative for agitation, behavioral problems and confusion  All other systems reviewed and are negative  Physical Exam  Physical Exam  Vitals signs and nursing note reviewed  Constitutional:       Appearance: She is well-developed     HENT:      Head: Normocephalic and atraumatic  Eyes:      Pupils: Pupils are equal, round, and reactive to light  Neck:      Musculoskeletal: Normal range of motion  Cardiovascular:      Rate and Rhythm: Normal rate and regular rhythm  Heart sounds: Normal heart sounds  Pulmonary:      Effort: Pulmonary effort is normal       Breath sounds: Normal breath sounds  Abdominal:      Palpations: Abdomen is soft  Musculoskeletal: Normal range of motion  Skin:     General: Skin is warm and dry  Neurological:      Mental Status: She is alert and oriented to person, place, and time  Psychiatric:         Behavior: Behavior normal          Thought Content: Thought content normal          Vital Signs  ED Triage Vitals [08/28/20 1358]   Temperature Pulse Resp Blood Pressure SpO2   97 6 °F (36 4 °C) 80 -- 113/63 99 %      Temp Source Heart Rate Source Patient Position - Orthostatic VS BP Location FiO2 (%)   Temporal Monitor Sitting Right arm --      Pain Score       6           Vitals:    08/28/20 1358 08/28/20 1602   BP: 113/63 105/56   Pulse: 80 81   Patient Position - Orthostatic VS: Sitting          Visual Acuity      ED Medications  Medications   dextrose 5 % and sodium chloride 0 9 % bolus 1,000 mL (0 mL Intravenous Stopped 8/28/20 1628)       Diagnostic Studies  Results Reviewed     Procedure Component Value Units Date/Time    TSH [[id number]]  (Abnormal) Collected:  08/28/20 1431    Lab Status:  Final result Specimen:  Blood from Arm, Right Updated:  08/28/20 1505     TSH 3RD GENERATON 0 351 uIU/mL     Narrative:       Patients undergoing fluorescein dye angiography may retain small amounts of fluorescein in the body for 48-72 hours post procedure  Samples containing fluorescein can produce falsely depressed TSH values  If the patient had this procedure,a specimen should be resubmitted post fluorescein clearance        Hepatic function panel [[id number]]  (Normal) Collected:  08/28/20 1431    Lab Status:  Final result Specimen:  Blood from Arm, Right Updated:  08/28/20 1504     Total Bilirubin 0 34 mg/dL      Bilirubin, Direct 0 06 mg/dL      Alkaline Phosphatase 55 U/L      AST 10 U/L      ALT 15 U/L      Total Protein 7 1 g/dL      Albumin 3 5 g/dL     Lipase [[id number]]  (Normal) Collected:  08/28/20 1431    Lab Status:  Final result Specimen:  Blood from Arm, Right Updated:  08/28/20 1504     Lipase 116 u/L     Magnesium [[id number]]  (Normal) Collected:  08/28/20 1431    Lab Status:  Final result Specimen:  Blood from Arm, Right Updated:  08/28/20 1504     Magnesium 1 6 mg/dL     POCT pregnancy, urine [[id number]]  (Abnormal) Resulted:  08/28/20 1459    Lab Status:  Final result Updated:  08/28/20 1459     EXT PREG TEST UR (Ref: Negative) positive     Control valid    Basic metabolic panel [[id number]]  (Abnormal) Collected:  08/28/20 1431    Lab Status:  Final result Specimen:  Blood from Arm, Right Updated:  08/28/20 1453     Sodium 134 mmol/L      Potassium 3 7 mmol/L      Chloride 101 mmol/L      CO2 23 mmol/L      ANION GAP 10 mmol/L      BUN 5 mg/dL      Creatinine 0 63 mg/dL      Glucose 74 mg/dL      Calcium 8 8 mg/dL      eGFR 130 ml/min/1 73sq m     Narrative:       [place] guidelines for Chronic Kidney Disease (CKD):     Stage 1 with normal or high GFR (GFR > 90 mL/min/1 73 square meters)    Stage 2 Mild CKD (GFR = 60-89 mL/min/1 73 square meters)    Stage 3A Moderate CKD (GFR = 45-59 mL/min/1 73 square meters)    Stage 3B Moderate CKD (GFR = 30-44 mL/min/1 73 square meters)    Stage 4 Severe CKD (GFR = 15-29 mL/min/1 73 square meters)    Stage 5 End Stage CKD (GFR <15 mL/min/1 73 square meters)  Note: GFR calculation is accurate only with a steady state creatinine    CBC and differential [[id number]]  (Abnormal) Collected:  08/28/20 1431    Lab Status:  Final result Specimen:  Blood from Arm, Right Updated:  08/28/20 1440     WBC 6 84 Thousand/uL      RBC 4 38 Million/uL      Hemoglobin 10 8 g/dL Hematocrit 35 4 %      MCV 81 fL      MCH 24 7 pg      MCHC 30 5 g/dL      RDW 16 8 %      MPV 9 4 fL      Platelets 495 Thousands/uL      nRBC 0 /100 WBCs      Neutrophils Relative 67 %      Immat GRANS % 0 %      Lymphocytes Relative 22 %      Monocytes Relative 7 %      Eosinophils Relative 4 %      Basophils Relative 0 %      Neutrophils Absolute 4 55 Thousands/µL      Immature Grans Absolute 0 03 Thousand/uL      Lymphocytes Absolute 1 50 Thousands/µL      Monocytes Absolute 0 48 Thousand/µL      Eosinophils Absolute 0 25 Thousand/µL      Basophils Absolute 0 03 Thousands/µL                  No orders to display              Procedures  ECG 12 Lead Documentation Only    Date/Time: 8/28/2020 2:49 PM  Performed by: Joana Torres MD  Authorized by: Joana Torres MD     ECG reviewed by me, the ED Provider: yes    Patient location:  ED  Interpretation:     Interpretation: normal    Rate:     ECG rate:  81    ECG rate assessment: normal    Rhythm:     Rhythm: sinus rhythm    QRS:     QRS axis:  Normal    QRS intervals:  Normal  Conduction:     Conduction: normal    ST segments:     ST segments:  Normal  T waves:     T waves: normal               ED Course  ED Course as of Aug 28 2333   Fri Aug 28, 2020   1540 Spoke with Dr Lonny Goldberg who is aware of pt's recent Hx  Explained her neuro exam appears normal, labs overall ok  Feel pt is appropriate for discharge, follow up  Dr Lonny Goldberg will discuss case with OB resident who can come down and evaluate  MDM  Number of Diagnoses or Management Options  Generalized weakness: new and requires workup  Diagnosis management comments: 1  Nausea - Pt with ongoing issues with nausea, she has a Zofran pump in place which she notes seems to help  She has been tolerating PO  Will check CBC, metabolic panel for electrolyte abnormalities and dehydration, TSH  Will give fluids, speak with OB         Amount and/or Complexity of Data Reviewed  Clinical lab tests: ordered and reviewed  Tests in the radiology section of CPT®: ordered and reviewed  Obtain history from someone other than the patient: yes  Review and summarize past medical records: yes  Independent visualization of images, tracings, or specimens: yes    Patient Progress  Patient progress: improved        Disposition  Final diagnoses:   Generalized weakness     Time reflects when diagnosis was documented in both MDM as applicable and the Disposition within this note     Time User Action Codes Description Comment    8/28/2020  3:59 PM Deya Galena Parkninfa Chapman [R53 1] Generalized weakness       ED Disposition     ED Disposition Condition Date/Time Comment    Discharge Stable Fri Aug 28, 2020  3:59 PM Aleclashanda Dennis discharge to home/self care              Follow-up Information     Follow up With Specialties Details Why Petros Escobedo MD Obstetrics and Gynecology, Obstetrics, Gynecology Schedule an appointment as soon as possible for a visit   [address] [place] [address]  [phone]            Discharge Medication List as of 8/28/2020  4:00 PM      CONTINUE these medications which have NOT CHANGED    Details   albuterol (PROVENTIL HFA,VENTOLIN HFA) 90 mcg/act inhaler Inhale 2 puffs, Historical Med      desvenlafaxine succinate (PRISTIQ) 50 mg 24 hr tablet Take 50 mg by mouth daily , Historical Med      fluticasone (FLONASE) 50 mcg/act nasal spray 2 sprays into each nostril daily, Starting Tue 1/22/2019, OTC      metoclopramide (REGLAN) 5 mg tablet Take 1 tablet (5 mg total) by mouth 4 (four) times a day, Starting Wed 8/12/2020, Normal      ondansetron (ZOFRAN) 4 MG/5ML solution Take by mouth once, Historical Med      ondansetron (ZOFRAN-ODT) 4 mg disintegrating tablet Take 1 tablet (4 mg total) by mouth every 6 (six) hours as needed for nausea or vomiting, Starting Tue 7/14/2020, Normal      pantoprazole (PROTONIX) 40 mg tablet Take 1 tablet (40 mg total) by mouth 2 (two) times a day, Starting Wed 7/29/2020, Print      Prenatal Multivit-Min-Fe-FA (PRENATAL 1 + IRON PO) Take by mouth daily, Historical Med      sertraline (ZOLOFT) 50 mg tablet Take 50 mg by mouth daily, Starting u 8/6/2020, Historical Med      sucralfate (CARAFATE) 1 g tablet Take 1 tablet (1 g total) by mouth 2 (two) times a day, Starting Wed 7/29/2020, Print           No discharge procedures on file      PDMP Review       Value Time User    PDMP Reviewed  Yes 7/11/2020  5:11 PM Camdendarien Resendiz DO          ED Provider  Electronically Signed by           Zoraida Abarca MD  08/28/20 1441

## 2020-08-29 LAB
ATRIAL RATE: 81 BPM
P AXIS: 62 DEGREES
PR INTERVAL: 196 MS
QRS AXIS: 84 DEGREES
QRSD INTERVAL: 86 MS
QT INTERVAL: 386 MS
QTC INTERVAL: 448 MS
T WAVE AXIS: 10 DEGREES
VENTRICULAR RATE: 81 BPM

## 2020-08-29 PROCEDURE — 93010 ELECTROCARDIOGRAM REPORT: CPT | Performed by: INTERNAL MEDICINE

## 2020-09-02 DIAGNOSIS — Z3A.09 9 WEEKS GESTATION OF PREGNANCY: ICD-10-CM

## 2020-09-03 ENCOUNTER — TELEPHONE (OUTPATIENT)
Dept: PERINATAL CARE | Facility: CLINIC | Age: 20
End: 2020-09-03

## 2020-09-03 NOTE — TELEPHONE ENCOUNTER
----- Message from Radha Stockton MD sent at 2020 11:33 AM EDT -----  Hi  RN staff, I've reviewed this Sequential Part 1 result which shows low preliminary risk for the conditions tested (trisomies 21 and 18), can you call her to inform her of this result? Please also mail her the requisition form for the Sequential Screen Part 2  Thank you    Radha Stockton MD

## 2020-09-03 NOTE — TELEPHONE ENCOUNTER
Attempted to contact pt with the results of the part 1 Sequential Screen  Unfortunately the number on file has a voicemail that is full and unable to provide results or explanation of part 2 Sequential Screen  TRF mailed

## 2020-09-03 NOTE — LETTER
09/03/20  Wendy Monaco  2000    Thank you for completing Part 1 of your Sequential Screen  To obtain a complete test result, please complete blood work for Part 2 Sequential Screen between the weeks of 9/16/2020 to 9/30/2020  Please verify a laboratory In Network with your insurance plan [facility] or [employer])  If you choose to use a [facility] lab, below are the available sites to have this blood work drawn  Call our office for any questions at [phone]      [address] [place] [address] Avenue  1492 [facility], [facility], [address]   2639 [facility], [facility], [address]  Phone:  [phone]     Phone:  [phone]    [address] 6 [facility] [place], 960 [facility], [address] [place] [address] Road  Phone: [phone]      Phone: [phone] Cayetano Jose for lab)    53 [facility]  59 [facility], [facility], [address] [place] [address]   700 [facility], [place], [facility] [address]  Phone: [phone]      Phone:  [phone]    Yusuf Bradley 664  1401 [facility], [address] [address]  Phone: [phone]      Phone:  [facility] [facility]  207 [facility], [facility], [address]   819 [facility], [facility], [facility] [address]  Phone: [phone]      Phone: [phone]    [facility] [place]     1896 [facility]  1430 [facility], [facility], [address], [facility], [address]  Phone:  [phone]     Phone: [phone]    81 [facility]  371 [facility] [address]  Phone: [phone]    Sincerely,    Todd Habermann, RN

## 2020-09-08 RX ORDER — METOCLOPRAMIDE 5 MG/1
5 TABLET ORAL 4 TIMES DAILY
Qty: 20 TABLET | Refills: 3 | Status: SHIPPED | OUTPATIENT
Start: 2020-09-08 | End: 2020-11-05 | Stop reason: SDUPTHER

## 2020-09-11 ENCOUNTER — ROUTINE PRENATAL (OUTPATIENT)
Dept: OBGYN CLINIC | Facility: CLINIC | Age: 20
End: 2020-09-11
Payer: COMMERCIAL

## 2020-09-11 VITALS
SYSTOLIC BLOOD PRESSURE: 104 MMHG | TEMPERATURE: 97.1 F | BODY MASS INDEX: 29.43 KG/M2 | DIASTOLIC BLOOD PRESSURE: 60 MMHG | WEIGHT: 179.6 LBS

## 2020-09-11 DIAGNOSIS — Z3A.15 15 WEEKS GESTATION OF PREGNANCY: Primary | ICD-10-CM

## 2020-09-11 DIAGNOSIS — O21.0 HYPEREMESIS ARISING DURING PREGNANCY: ICD-10-CM

## 2020-09-11 DIAGNOSIS — O99.019 ANEMIA AFFECTING PREGNANCY, ANTEPARTUM: ICD-10-CM

## 2020-09-11 LAB
SL AMB  POCT GLUCOSE, UA: NORMAL
SL AMB POCT URINE PROTEIN: NORMAL

## 2020-09-11 PROCEDURE — 81002 URINALYSIS NONAUTO W/O SCOPE: CPT | Performed by: OBSTETRICS & GYNECOLOGY

## 2020-09-11 PROCEDURE — PNV: Performed by: OBSTETRICS & GYNECOLOGY

## 2020-09-11 NOTE — PROGRESS NOTES
Patient was seen today for prenatal visit  1  15 1 weeks-good fetal heart tones noted  Patient to follow-up 4 weeks time for prenatal visit  Has part 2 of sequential testing, to be done in the next few weeks  Part 1 was normal   2  Hyperemesis-continues on Zofran pump  She is doing much better with this  She had dry heaving over the past weekend but has been good this week  She is interested in trying to stop the pump  She will discuss with the home infusion nurses about weaning off of the medication  I will support her in this  3  History of GERD-did see GI about 3 weeks ago  Carafate dose was increased  Continue Protonix  4  History of depression-was on Pristiq, switched to Zoloft a few weeks ago by her psychiatrist   5  Possible hyperthyroidism-thought to be related to hyperemesis  Dr Katerina Salgado recommended no intervention for this  6  Anemia-recommend iron supplementation  Patient with some difficulty taking iron in the past   We will recheck CBC with [place] labs later in pregnancy  If anemia persists, may need Venofer  She was given Venofer while in the hospital previously  7  Family history of factor 5-request was given, she will get this done with her done with her next lab testing

## 2020-09-11 NOTE — PATIENT INSTRUCTIONS
Pregnancy at 15 to 18 Weeks   [address]:   What changes are happening in my body? Now that you are in your second trimester, you have more energy  You may also feel hungrier than usual  You may start to experience other symptoms, such as heartburn or dizziness  You may be gaining about ½ to 1 pound a week, and your pregnancy is beginning to show  You may need to start wearing maternity clothes  How do I care for myself at this stage of my pregnancy? · Manage heartburn  by eating 4 or 5 small meals each day instead of large meals  Avoid spicy foods  Avoid eating right before bedtime  · Manage nausea and vomiting  Avoid fatty and spicy foods  Eat small meals throughout the day instead of large meals  Venessa may help to decrease nausea  Ask your healthcare provider about other ways of decreasing nausea and vomiting  · Eat a variety of healthy foods  Healthy foods include fruits, vegetables, whole-grain breads, low-fat dairy foods, beans, lean meats, and fish  Drink liquids as directed  Ask how much liquid to drink each day and which liquids are best for you  Limit caffeine to less than 200 milligrams each day  Limit your intake of fish to 2 servings each week  Choose fish low in mercury such as canned light tuna, shrimp, salmon, cod, or tilapia  Do not  eat fish high in mercury such as swordfish, tilefish, cee mackerel, and shark  · Take prenatal vitamins as directed  Your need for certain vitamins and minerals, such as folic acid, increases during pregnancy  Prenatal vitamins provide some of the extra vitamins and minerals you need  Prenatal vitamins may also help to decrease the risk of certain birth defects  · Do not smoke  If you smoke, it is never too late to quit  Smoking increases your risk of a miscarriage and other health problems during your pregnancy  Smoking can cause your baby to be born too early or weigh less at birth   Ask your healthcare provider for information if you need help quitting  · Do not drink alcohol  Alcohol passes from your body to your baby through the placenta  It can affect your baby's brain development and cause fetal alcohol syndrome (FAS)  FAS is a group of conditions that causes mental, behavior, and growth problems  · Talk to your healthcare provider before you take any medicines  Many medicines may harm your baby if you take them when you are pregnant  Do not take any medicines, vitamins, herbs, or supplements without first talking to your healthcare provider  Never use illegal or street drugs (such as marijuana or cocaine) while you are pregnant  What are some safety tips during pregnancy? · Avoid hot tubs and saunas  Do not use a hot tub or sauna while you are pregnant, especially during your first trimester  Hot tubs and saunas may raise your baby's temperature and increase the risk of birth defects  · Avoid toxoplasmosis  This is an infection caused by eating raw meat or being around infected cat feces  It can cause birth defects, miscarriages, and other problems  Wash your hands after you touch raw meat  Make sure any meat is well-cooked before you eat it  Avoid raw eggs and unpasteurized milk  Use gloves or ask someone else to clean your cat's litter box while you are pregnant  What changes are happening with my baby? By 18 weeks, your baby may be about 6 inches long from the top of the head to the rump (baby's bottom)  Your baby may weigh about 11 ounces  You may be able to feel your baby's movement at about 18 weeks or later  The first movements may not be that noticeable  They may feel like a fluttering sensation  Your baby also makes sucking movements and can hear certain sounds  What do I need to know about prenatal care? During the first 28 weeks of your pregnancy, you will see your healthcare provider once a month  Your healthcare provider will check your blood pressure and weight   You may also need any of the following:  · A urine test  may also be done to check for sugar and protein  These can be signs of gestational diabetes or infection  · A blood test  may be done to check for anemia (low iron level)  · Fundal height check  is a measurement of your uterus to check your baby's growth  This number is usually the same as the number of weeks that you have been pregnant  · An ultrasound  may be done to check your baby's development  Your healthcare provider may be able to tell you what your baby's gender is during the ultrasound  · Your baby's heart rate  will be checked  When should I seek immediate care? · You have pain or cramping in your abdomen or low back  · You have heavy vaginal bleeding or clotting  · You pass material that looks like tissue or large clots  Collect the material and bring it with you  When should I contact my healthcare provider? · You cannot keep food or drinks down, and you are losing weight  · You have light bleeding  · You have chills or a fever  · You have vaginal itching, burning, or pain  · You have yellow, green, white, or foul-smelling vaginal discharge  · You have pain or burning when you urinate, less urine than usual, or pink or bloody urine  · You have questions or concerns about your condition or care  CARE AGREEMENT:   You have the right to help plan your care  Learn about your health condition and how it may be treated  Discuss treatment options with your caregivers to decide what care you want to receive  You always have the right to refuse treatment  The above information is an  only  It is not intended as medical advice for individual conditions or treatments  Talk to your doctor, nurse or pharmacist before following any medical regimen to see if it is safe and effective for you    © 2017 [employer] Information is for End User's use only and may not be sold, redistributed or otherwise used for commercial purposes  All illustrations and images included in CareNotes® are the copyrighted property of A D A M , Inc  or Patrice Danielle

## 2020-09-22 ENCOUNTER — TELEMEDICINE (OUTPATIENT)
Dept: FAMILY MEDICINE CLINIC | Facility: CLINIC | Age: 20
End: 2020-09-22
Payer: COMMERCIAL

## 2020-09-22 VITALS — WEIGHT: 177 LBS | BODY MASS INDEX: 29.49 KG/M2 | HEIGHT: 65 IN | TEMPERATURE: 97.8 F

## 2020-09-22 DIAGNOSIS — J02.9 SORE THROAT: ICD-10-CM

## 2020-09-22 DIAGNOSIS — J02.9 SORE THROAT: Primary | ICD-10-CM

## 2020-09-22 PROCEDURE — 99213 OFFICE O/P EST LOW 20 MIN: CPT | Performed by: NURSE PRACTITIONER

## 2020-09-22 PROCEDURE — U0003 INFECTIOUS AGENT DETECTION BY NUCLEIC ACID (DNA OR RNA); SEVERE ACUTE RESPIRATORY SYNDROME CORONAVIRUS 2 (SARS-COV-2) (CORONAVIRUS DISEASE [COVID-19]), AMPLIFIED PROBE TECHNIQUE, MAKING USE OF HIGH THROUGHPUT TECHNOLOGIES AS DESCRIBED BY CMS-2020-01-R: HCPCS | Performed by: NURSE PRACTITIONER

## 2020-09-22 PROCEDURE — 3725F SCREEN DEPRESSION PERFORMED: CPT | Performed by: NURSE PRACTITIONER

## 2020-09-22 NOTE — PROGRESS NOTES
COVID-19 Virtual Visit     Assessment/Plan:  Sore throat and fever  Was referred for testing  Will maintain isolation until results are known  If neg with come in for ov  Problem List Items Addressed This Visit     None      Visit Diagnoses     Sore throat    -  Primary    Relevant Orders    Novel Coronavirus (COVID-19), PCR [facility] - Collected at   [facility] or [facility]        This virtual check-in was done via [employer] and patient was informed that this is not a secure, HIPAA-complaint platform  She agrees to proceed     Disposition:      I referred Mino Paula to one of our centralized sites for a COVID-19 swab    I spent 10 minutes directly with the patient during this visit    Encounter provider Luis Goodell, [address]    Provider located at [address] [place] [address]  [phone]    Recent Visits  No visits were found meeting these conditions  Showing recent visits within past 7 days and meeting all other requirements     Today's Visits  Date Type Provider Dept   09/22/20 Telemedicine Luis Goodell, CRNP [address]   Showing today's visits and meeting all other requirements     Future Appointments  No visits were found meeting these conditions  Showing future appointments within next 150 days and meeting all other requirements        Patient agrees to participate in a virtual check in via telephone or video visit instead of presenting to the office to address urgent/immediate medical needs  Patient is aware this is a billable service  After connecting through [employer], the patient was identified by name and date of birth  Huong Cosby was informed that this was a telemedicine visit and that the exam was being conducted confidentially over secure lines  My office door was closed  No one else was in the room    Huong Cosby acknowledged consent and understanding of privacy and security of the telemedicine visit  I informed the patient that I have reviewed her record in Epic and presented the opportunity for her to ask any questions regarding the visit today  The patient agreed to participate  Subjective  Teo Lopez is a 21 y o  female who is concerned about COVID-19  She reports fever, cough and sore throat  She has not experienced repeated shaking with chills, abdominal pain, diarrhea and nausea She has not had contact with a person who is under investigation for or who is positive for COVID-19 within the last 14 days  She has not been hospitalized recently for fever and/or lower respiratory symptoms  Past Medical History:   Diagnosis Date    Abnormal ECG     no diagnosis    Anemia     Anxiety     Asthma     Depression     Endometriosis     Frequent sinus infections     GERD (gastroesophageal reflux disease)     Migraine     Miscarriage     Ovarian cyst     Syncope     Trauma     Varicella     vaccine       Past Surgical History:   Procedure Laterality Date    TONSILLECTOMY  2006    UPPER GASTROINTESTINAL ENDOSCOPY  05/18/2017    Dysphagia, dilated to [facility]   UPPER GASTROINTESTINAL ENDOSCOPY  10/07/2016    Dysphagia, dilated to [facility]  Biopsies negative for eosinophilic esophagitis    UPPER GASTROINTESTINAL ENDOSCOPY  02/22/2018    Dysphagia, dilated to [facility]    Biopsies negative for eosinophilic esophagitis    WISDOM TOOTH EXTRACTION         Current Outpatient Medications   Medication Sig Dispense Refill    albuterol (PROVENTIL HFA,VENTOLIN HFA) 90 mcg/act inhaler Inhale 2 puffs      metoclopramide (REGLAN) 5 mg tablet Take 1 tablet (5 mg total) by mouth 4 (four) times a day 20 tablet 3    ondansetron (ZOFRAN) 4 MG/5ML solution Take by mouth once      pantoprazole (PROTONIX) 40 mg tablet Take 1 tablet (40 mg total) by mouth 2 (two) times a day 90 tablet 3    Prenatal Multivit-Min-Fe-FA (PRENATAL 1 + IRON PO) Take by mouth daily      sertraline (ZOLOFT) 50 mg tablet Take 50 mg by mouth daily      sucralfate (CARAFATE) 1 g tablet Take 1 tablet (1 g total) by mouth 2 (two) times a day 180 tablet 3     No current facility-administered medications for this visit  Allergies   Allergen Reactions    Strawberry C [Ascorbate] Anaphylaxis    Mold Extract [Trichophyton]     Cat Hair Extract        Review of Systems   Constitutional: Positive for fatigue and fever  HENT: Positive for congestion, ear pain, sinus pain and sore throat  Respiratory: Positive for cough  Neurological: Negative  Video Exam    Vitals:    09/22/20 0849   Temp: 97 8 °F (36 6 °C)   TempSrc: Oral   Weight: 80 3 kg (177 lb)   Height: 5' 5" (1 651 m)         Martha Conklin appears healthy  Physical Exam  Constitutional:       Appearance: She is well-developed  Pulmonary:      Effort: Pulmonary effort is normal  No respiratory distress  Breath sounds: Normal breath sounds  No wheezing  Skin:     Coloration: Skin is not pale  Findings: No erythema or rash  Neurological:      Mental Status: She is alert and oriented to person, place, and time  Psychiatric:         Behavior: Behavior normal          Thought Content: Thought content normal           VIRTUAL VISIT DISCLAIMER    aisha Kuhnpool acknowledges that she has consented to an online visit or consultation  She understands that the online visit is based solely on information provided by her, and that, in the absence of a face-to-face physical evaluation by the physician, the diagnosis she receives is both limited and provisional in terms of accuracy and completeness  This is not intended to replace a full medical face-to-face evaluation by the physician  Felicia Kuhnpool understands and accepts these terms

## 2020-09-23 LAB — SARS-COV-2 RNA SPEC QL NAA+PROBE: NOT DETECTED

## 2020-09-24 ENCOUNTER — TELEPHONE (OUTPATIENT)
Dept: FAMILY MEDICINE CLINIC | Facility: CLINIC | Age: 20
End: 2020-09-24

## 2020-09-24 NOTE — TELEPHONE ENCOUNTER
samuelom     ----- Message from Art Mercado Dr sent at 9/24/2020  6:20 AM EDT -----  Please call and let her know that her covid is neg

## 2020-09-25 ENCOUNTER — TELEPHONE (OUTPATIENT)
Dept: PERINATAL CARE | Facility: CLINIC | Age: 20
End: 2020-09-25

## 2020-09-25 LAB — F5 GENE MUT ANL BLD/T: NORMAL

## 2020-09-25 NOTE — TELEPHONE ENCOUNTER
TC to patient  L/M reporting second trimester sequential screen negative with modified risks as indicated on report

## 2020-10-08 ENCOUNTER — ROUTINE PRENATAL (OUTPATIENT)
Dept: OBGYN CLINIC | Facility: CLINIC | Age: 20
End: 2020-10-08
Payer: COMMERCIAL

## 2020-10-08 VITALS
HEIGHT: 66 IN | TEMPERATURE: 96.8 F | WEIGHT: 182 LBS | DIASTOLIC BLOOD PRESSURE: 78 MMHG | SYSTOLIC BLOOD PRESSURE: 118 MMHG | BODY MASS INDEX: 29.25 KG/M2

## 2020-10-08 DIAGNOSIS — O99.019 ANEMIA AFFECTING PREGNANCY, ANTEPARTUM: ICD-10-CM

## 2020-10-08 DIAGNOSIS — O21.0 HYPEREMESIS ARISING DURING PREGNANCY: ICD-10-CM

## 2020-10-08 DIAGNOSIS — Z3A.19 19 WEEKS GESTATION OF PREGNANCY: Primary | ICD-10-CM

## 2020-10-08 LAB
SL AMB  POCT GLUCOSE, UA: NORMAL
SL AMB POCT URINE PROTEIN: NORMAL

## 2020-10-08 PROCEDURE — 81002 URINALYSIS NONAUTO W/O SCOPE: CPT | Performed by: OBSTETRICS & GYNECOLOGY

## 2020-10-08 PROCEDURE — PNV: Performed by: OBSTETRICS & GYNECOLOGY

## 2020-10-15 ENCOUNTER — TELEPHONE (OUTPATIENT)
Dept: PERINATAL CARE | Facility: OTHER | Age: 20
End: 2020-10-15

## 2020-10-16 ENCOUNTER — ROUTINE PRENATAL (OUTPATIENT)
Dept: PERINATAL CARE | Facility: CLINIC | Age: 20
End: 2020-10-16
Payer: COMMERCIAL

## 2020-10-16 VITALS
DIASTOLIC BLOOD PRESSURE: 58 MMHG | WEIGHT: 183.6 LBS | TEMPERATURE: 99.5 F | HEIGHT: 66 IN | SYSTOLIC BLOOD PRESSURE: 94 MMHG | HEART RATE: 104 BPM | BODY MASS INDEX: 29.51 KG/M2

## 2020-10-16 DIAGNOSIS — Z3A.20 20 WEEKS GESTATION OF PREGNANCY: ICD-10-CM

## 2020-10-16 DIAGNOSIS — O21.0 HYPEREMESIS ARISING DURING PREGNANCY: ICD-10-CM

## 2020-10-16 DIAGNOSIS — Z36.3 ENCOUNTER FOR ANTENATAL SCREENING FOR MALFORMATION USING ULTRASOUND: Primary | ICD-10-CM

## 2020-10-16 DIAGNOSIS — O43.192 MARGINAL INSERTION OF UMBILICAL CORD AFFECTING MANAGEMENT OF MOTHER IN SECOND TRIMESTER: ICD-10-CM

## 2020-10-16 DIAGNOSIS — Z36.86 ENCOUNTER FOR ANTENATAL SCREENING FOR CERVICAL LENGTH: ICD-10-CM

## 2020-10-16 PROBLEM — R10.2 PELVIC PAIN: Status: RESOLVED | Noted: 2018-12-27 | Resolved: 2020-10-16

## 2020-10-16 PROCEDURE — 99212 OFFICE O/P EST SF 10 MIN: CPT | Performed by: OBSTETRICS & GYNECOLOGY

## 2020-10-16 PROCEDURE — 76805 OB US >/= 14 WKS SNGL FETUS: CPT | Performed by: OBSTETRICS & GYNECOLOGY

## 2020-10-16 PROCEDURE — 1036F TOBACCO NON-USER: CPT | Performed by: OBSTETRICS & GYNECOLOGY

## 2020-10-16 PROCEDURE — 76817 TRANSVAGINAL US OBSTETRIC: CPT | Performed by: OBSTETRICS & GYNECOLOGY

## 2020-10-16 RX ORDER — ASPIRIN 81 MG/1
81 TABLET, CHEWABLE ORAL DAILY
COMMUNITY
End: 2021-03-03 | Stop reason: HOSPADM

## 2020-10-19 DIAGNOSIS — Z3A.09 9 WEEKS GESTATION OF PREGNANCY: ICD-10-CM

## 2020-10-19 RX ORDER — METOCLOPRAMIDE 5 MG/1
5 TABLET ORAL 4 TIMES DAILY
Qty: 20 TABLET | Refills: 0 | Status: CANCELLED | OUTPATIENT
Start: 2020-10-19

## 2020-10-21 DIAGNOSIS — Z3A.09 9 WEEKS GESTATION OF PREGNANCY: ICD-10-CM

## 2020-10-21 RX ORDER — METOCLOPRAMIDE 5 MG/1
5 TABLET ORAL 4 TIMES DAILY
Qty: 20 TABLET | Refills: 0 | Status: CANCELLED | OUTPATIENT
Start: 2020-10-21

## 2020-10-22 DIAGNOSIS — Z3A.09 9 WEEKS GESTATION OF PREGNANCY: ICD-10-CM

## 2020-10-22 RX ORDER — METOCLOPRAMIDE 5 MG/1
5 TABLET ORAL 4 TIMES DAILY
Qty: 20 TABLET | Refills: 0 | Status: CANCELLED | OUTPATIENT
Start: 2020-10-22

## 2020-10-27 ENCOUNTER — IMMUNIZATIONS (OUTPATIENT)
Dept: FAMILY MEDICINE CLINIC | Facility: CLINIC | Age: 20
End: 2020-10-27
Payer: COMMERCIAL

## 2020-10-27 DIAGNOSIS — Z23 ENCOUNTER FOR IMMUNIZATION: ICD-10-CM

## 2020-10-27 PROCEDURE — 90471 IMMUNIZATION ADMIN: CPT

## 2020-10-27 PROCEDURE — 90686 IIV4 VACC NO PRSV 0.5 ML IM: CPT

## 2020-11-05 ENCOUNTER — ROUTINE PRENATAL (OUTPATIENT)
Dept: OBGYN CLINIC | Facility: CLINIC | Age: 20
End: 2020-11-05
Payer: COMMERCIAL

## 2020-11-05 VITALS
DIASTOLIC BLOOD PRESSURE: 76 MMHG | HEIGHT: 66 IN | TEMPERATURE: 98.2 F | BODY MASS INDEX: 30.6 KG/M2 | SYSTOLIC BLOOD PRESSURE: 120 MMHG | WEIGHT: 190.4 LBS

## 2020-11-05 DIAGNOSIS — B37.3 VAGINAL CANDIDIASIS: ICD-10-CM

## 2020-11-05 DIAGNOSIS — Z3A.09 9 WEEKS GESTATION OF PREGNANCY: ICD-10-CM

## 2020-11-05 DIAGNOSIS — Z3A.20 20 WEEKS GESTATION OF PREGNANCY: ICD-10-CM

## 2020-11-05 DIAGNOSIS — O21.0 HYPEREMESIS ARISING DURING PREGNANCY: ICD-10-CM

## 2020-11-05 DIAGNOSIS — Z3A.23 23 WEEKS GESTATION OF PREGNANCY: Primary | ICD-10-CM

## 2020-11-05 LAB
BV WHIFF TEST VAG QL: NEGATIVE
CLUE CELLS SPEC QL WET PREP: NEGATIVE
PH SMN: 3.5 [PH]
SL AMB  POCT GLUCOSE, UA: NORMAL
SL AMB POCT URINE PROTEIN: NORMAL
SL AMB POCT WET MOUNT: YES
T VAGINALIS VAG QL WET PREP: NEGATIVE
YEAST VAG QL WET PREP: POSITIVE

## 2020-11-05 PROCEDURE — 81002 URINALYSIS NONAUTO W/O SCOPE: CPT | Performed by: OBSTETRICS & GYNECOLOGY

## 2020-11-05 PROCEDURE — 87210 SMEAR WET MOUNT SALINE/INK: CPT | Performed by: OBSTETRICS & GYNECOLOGY

## 2020-11-05 PROCEDURE — 3008F BODY MASS INDEX DOCD: CPT | Performed by: OBSTETRICS & GYNECOLOGY

## 2020-11-05 PROCEDURE — PNV: Performed by: OBSTETRICS & GYNECOLOGY

## 2020-11-05 RX ORDER — METOCLOPRAMIDE 5 MG/1
5 TABLET ORAL 4 TIMES DAILY
Qty: 20 TABLET | Refills: 3 | Status: SHIPPED | OUTPATIENT
Start: 2020-11-05 | End: 2020-12-03 | Stop reason: SDUPTHER

## 2020-11-05 RX ORDER — ONDANSETRON 4 MG/1
4 TABLET, ORALLY DISINTEGRATING ORAL EVERY 8 HOURS PRN
Qty: 20 TABLET | Refills: 3 | Status: SHIPPED | OUTPATIENT
Start: 2020-11-05 | End: 2020-12-28 | Stop reason: ALTCHOICE

## 2020-11-09 ENCOUNTER — TELEPHONE (OUTPATIENT)
Dept: OBGYN CLINIC | Facility: CLINIC | Age: 20
End: 2020-11-09

## 2020-11-13 DIAGNOSIS — D64.9 ANEMIA, UNSPECIFIED TYPE: Primary | ICD-10-CM

## 2020-11-13 LAB
ERYTHROCYTE [DISTWIDTH] IN BLOOD BY AUTOMATED COUNT: 13.5 % (ref 11.7–15.4)
GLUCOSE 1H P 50 G GLC PO SERPL-MCNC: 92 MG/DL (ref 65–139)
HCT VFR BLD AUTO: 30.3 % (ref 34–46.6)
HGB BLD-MCNC: 9.9 G/DL (ref 11.1–15.9)
HIV 1+2 AB+HIV1 P24 AG SERPL QL IA: NON REACTIVE
MCH RBC QN AUTO: 28 PG (ref 26.6–33)
MCHC RBC AUTO-ENTMCNC: 32.7 G/DL (ref 31.5–35.7)
MCV RBC AUTO: 86 FL (ref 79–97)
PLATELET # BLD AUTO: 234 X10E3/UL (ref 150–450)
RBC # BLD AUTO: 3.54 X10E6/UL (ref 3.77–5.28)
RPR SER QL: NON REACTIVE
WBC # BLD AUTO: 8.1 X10E3/UL (ref 3.4–10.8)

## 2020-11-13 RX ORDER — IRON POLYSACCHARIDE COMPLEX 150 MG
150 CAPSULE ORAL 2 TIMES DAILY
Qty: 180 CAPSULE | Refills: 0 | Status: SHIPPED | OUTPATIENT
Start: 2020-11-13 | End: 2021-02-08

## 2020-12-03 ENCOUNTER — ROUTINE PRENATAL (OUTPATIENT)
Dept: OBGYN CLINIC | Facility: CLINIC | Age: 20
End: 2020-12-03
Payer: COMMERCIAL

## 2020-12-03 VITALS
BODY MASS INDEX: 32.09 KG/M2 | WEIGHT: 192.6 LBS | SYSTOLIC BLOOD PRESSURE: 126 MMHG | DIASTOLIC BLOOD PRESSURE: 72 MMHG | HEIGHT: 65 IN

## 2020-12-03 DIAGNOSIS — Z3A.27 27 WEEKS GESTATION OF PREGNANCY: Primary | ICD-10-CM

## 2020-12-03 DIAGNOSIS — O99.019 ANEMIA AFFECTING PREGNANCY, ANTEPARTUM: ICD-10-CM

## 2020-12-03 DIAGNOSIS — Z3A.09 9 WEEKS GESTATION OF PREGNANCY: ICD-10-CM

## 2020-12-03 DIAGNOSIS — R11.10 HYPEREMESIS: ICD-10-CM

## 2020-12-03 LAB
SL AMB  POCT GLUCOSE, UA: NORMAL
SL AMB POCT URINE PROTEIN: NORMAL

## 2020-12-03 PROCEDURE — 81002 URINALYSIS NONAUTO W/O SCOPE: CPT | Performed by: OBSTETRICS & GYNECOLOGY

## 2020-12-03 PROCEDURE — PNV: Performed by: OBSTETRICS & GYNECOLOGY

## 2020-12-03 RX ORDER — METOCLOPRAMIDE 5 MG/1
5 TABLET ORAL 4 TIMES DAILY
Qty: 20 TABLET | Refills: 3 | Status: SHIPPED | OUTPATIENT
Start: 2020-12-03 | End: 2021-01-06 | Stop reason: SDUPTHER

## 2020-12-10 ENCOUNTER — TELEPHONE (OUTPATIENT)
Dept: PERINATAL CARE | Facility: CLINIC | Age: 20
End: 2020-12-10

## 2020-12-11 ENCOUNTER — ULTRASOUND (OUTPATIENT)
Dept: PERINATAL CARE | Facility: CLINIC | Age: 20
End: 2020-12-11
Payer: COMMERCIAL

## 2020-12-11 VITALS
BODY MASS INDEX: 32.89 KG/M2 | WEIGHT: 197.4 LBS | HEART RATE: 106 BPM | HEIGHT: 65 IN | SYSTOLIC BLOOD PRESSURE: 114 MMHG | DIASTOLIC BLOOD PRESSURE: 66 MMHG

## 2020-12-11 DIAGNOSIS — Z3A.28 28 WEEKS GESTATION OF PREGNANCY: ICD-10-CM

## 2020-12-11 DIAGNOSIS — O21.0 HYPEREMESIS ARISING DURING PREGNANCY: Primary | ICD-10-CM

## 2020-12-11 PROBLEM — N94.0 MITTELSCHMERZ: Status: RESOLVED | Noted: 2018-12-27 | Resolved: 2020-12-11

## 2020-12-11 PROBLEM — O43.192 MARGINAL INSERTION OF UMBILICAL CORD AFFECTING MANAGEMENT OF MOTHER IN SECOND TRIMESTER: Status: RESOLVED | Noted: 2020-10-16 | Resolved: 2020-12-11

## 2020-12-11 PROCEDURE — 99212 OFFICE O/P EST SF 10 MIN: CPT | Performed by: OBSTETRICS & GYNECOLOGY

## 2020-12-11 PROCEDURE — 1036F TOBACCO NON-USER: CPT | Performed by: OBSTETRICS & GYNECOLOGY

## 2020-12-11 PROCEDURE — 76816 OB US FOLLOW-UP PER FETUS: CPT | Performed by: OBSTETRICS & GYNECOLOGY

## 2020-12-11 PROCEDURE — 3008F BODY MASS INDEX DOCD: CPT | Performed by: OBSTETRICS & GYNECOLOGY

## 2020-12-18 ENCOUNTER — TELEPHONE (OUTPATIENT)
Dept: FAMILY MEDICINE CLINIC | Facility: CLINIC | Age: 20
End: 2020-12-18

## 2020-12-18 ENCOUNTER — TELEPHONE (OUTPATIENT)
Dept: OBGYN CLINIC | Facility: CLINIC | Age: 20
End: 2020-12-18

## 2020-12-18 DIAGNOSIS — Z3A.28 28 WEEKS GESTATION OF PREGNANCY: ICD-10-CM

## 2020-12-22 ENCOUNTER — TELEPHONE (OUTPATIENT)
Dept: OBGYN CLINIC | Facility: CLINIC | Age: 20
End: 2020-12-22

## 2020-12-22 ENCOUNTER — TELEPHONE (OUTPATIENT)
Dept: PERINATAL CARE | Facility: CLINIC | Age: 20
End: 2020-12-22

## 2020-12-25 ENCOUNTER — TELEPHONE (OUTPATIENT)
Dept: OTHER | Facility: OTHER | Age: 20
End: 2020-12-25

## 2020-12-25 ENCOUNTER — HOSPITAL ENCOUNTER (EMERGENCY)
Facility: HOSPITAL | Age: 20
Discharge: HOME | End: 2020-12-25
Attending: EMERGENCY MEDICINE
Payer: COMMERCIAL

## 2020-12-25 VITALS
RESPIRATION RATE: 20 BRPM | HEART RATE: 97 BPM | SYSTOLIC BLOOD PRESSURE: 114 MMHG | DIASTOLIC BLOOD PRESSURE: 66 MMHG | OXYGEN SATURATION: 97 % | WEIGHT: 195 LBS | BODY MASS INDEX: 32.45 KG/M2 | TEMPERATURE: 98.5 F

## 2020-12-25 DIAGNOSIS — O99.019 ANEMIA DURING PREGNANCY: ICD-10-CM

## 2020-12-25 DIAGNOSIS — R06.00 DYSPNEA: ICD-10-CM

## 2020-12-25 DIAGNOSIS — U07.1 COVID-19: Primary | ICD-10-CM

## 2020-12-25 LAB
ALBUMIN SERPL BCP-MCNC: 2.8 G/DL (ref 3.5–5)
ALP SERPL-CCNC: 94 U/L (ref 46–116)
ALT SERPL W P-5'-P-CCNC: 16 U/L (ref 12–78)
ANION GAP SERPL CALCULATED.3IONS-SCNC: 8 MMOL/L (ref 4–13)
AST SERPL W P-5'-P-CCNC: 17 U/L (ref 5–45)
BASOPHILS # BLD AUTO: 0.01 THOUSANDS/ΜL (ref 0–0.1)
BASOPHILS NFR BLD AUTO: 0 % (ref 0–1)
BILIRUB SERPL-MCNC: 0.26 MG/DL (ref 0.2–1)
BUN SERPL-MCNC: 5 MG/DL (ref 5–25)
CALCIUM ALBUM COR SERPL-MCNC: 9.4 MG/DL (ref 8.3–10.1)
CALCIUM SERPL-MCNC: 8.4 MG/DL (ref 8.3–10.1)
CHLORIDE SERPL-SCNC: 102 MMOL/L (ref 100–108)
CO2 SERPL-SCNC: 25 MMOL/L (ref 21–32)
CREAT SERPL-MCNC: 0.64 MG/DL (ref 0.6–1.3)
EOSINOPHIL # BLD AUTO: 0.12 THOUSAND/ΜL (ref 0–0.61)
EOSINOPHIL NFR BLD AUTO: 3 % (ref 0–6)
ERYTHROCYTE [DISTWIDTH] IN BLOOD BY AUTOMATED COUNT: 12.4 % (ref 11.6–15.1)
GFR SERPL CREATININE-BSD FRML MDRD: 129 ML/MIN/1.73SQ M
GLUCOSE SERPL-MCNC: 81 MG/DL (ref 65–140)
HCT VFR BLD AUTO: 30.2 % (ref 34.8–46.1)
HGB BLD-MCNC: 9.4 G/DL (ref 11.5–15.4)
IMM GRANULOCYTES # BLD AUTO: 0.1 THOUSAND/UL (ref 0–0.2)
IMM GRANULOCYTES NFR BLD AUTO: 2 % (ref 0–2)
LYMPHOCYTES # BLD AUTO: 0.85 THOUSANDS/ΜL (ref 0.6–4.47)
LYMPHOCYTES NFR BLD AUTO: 18 % (ref 14–44)
MCH RBC QN AUTO: 26.9 PG (ref 26.8–34.3)
MCHC RBC AUTO-ENTMCNC: 31.1 G/DL (ref 31.4–37.4)
MCV RBC AUTO: 86 FL (ref 82–98)
MONOCYTES # BLD AUTO: 0.43 THOUSAND/ΜL (ref 0.17–1.22)
MONOCYTES NFR BLD AUTO: 9 % (ref 4–12)
NEUTROPHILS # BLD AUTO: 3.12 THOUSANDS/ΜL (ref 1.85–7.62)
NEUTS SEG NFR BLD AUTO: 68 % (ref 43–75)
NRBC BLD AUTO-RTO: 0 /100 WBCS
PLATELET # BLD AUTO: 173 THOUSANDS/UL (ref 149–390)
PMV BLD AUTO: 9.9 FL (ref 8.9–12.7)
POTASSIUM SERPL-SCNC: 3.4 MMOL/L (ref 3.5–5.3)
PROT SERPL-MCNC: 6.6 G/DL (ref 6.4–8.2)
RBC # BLD AUTO: 3.5 MILLION/UL (ref 3.81–5.12)
SODIUM SERPL-SCNC: 135 MMOL/L (ref 136–145)
WBC # BLD AUTO: 4.63 THOUSAND/UL (ref 4.31–10.16)

## 2020-12-25 PROCEDURE — 99285 EMERGENCY DEPT VISIT HI MDM: CPT

## 2020-12-25 PROCEDURE — 36415 COLL VENOUS BLD VENIPUNCTURE: CPT | Performed by: EMERGENCY MEDICINE

## 2020-12-25 PROCEDURE — 99282 EMERGENCY DEPT VISIT SF MDM: CPT | Performed by: EMERGENCY MEDICINE

## 2020-12-25 PROCEDURE — 85025 COMPLETE CBC W/AUTO DIFF WBC: CPT | Performed by: EMERGENCY MEDICINE

## 2020-12-25 PROCEDURE — 80053 COMPREHEN METABOLIC PANEL: CPT | Performed by: EMERGENCY MEDICINE

## 2020-12-28 ENCOUNTER — HOSPITAL ENCOUNTER (OUTPATIENT)
Facility: HOSPITAL | Age: 20
Discharge: HOME | End: 2020-12-28
Attending: OBSTETRICS & GYNECOLOGY | Admitting: OBSTETRICS & GYNECOLOGY
Payer: COMMERCIAL

## 2020-12-28 ENCOUNTER — TELEMEDICINE (OUTPATIENT)
Dept: PERINATAL CARE | Facility: CLINIC | Age: 20
End: 2020-12-28
Payer: COMMERCIAL

## 2020-12-28 VITALS
SYSTOLIC BLOOD PRESSURE: 108 MMHG | WEIGHT: 197 LBS | OXYGEN SATURATION: 96 % | RESPIRATION RATE: 20 BRPM | HEIGHT: 65 IN | TEMPERATURE: 98.8 F | BODY MASS INDEX: 32.82 KG/M2 | HEART RATE: 97 BPM | DIASTOLIC BLOOD PRESSURE: 59 MMHG

## 2020-12-28 DIAGNOSIS — U07.1 COVID-19 AFFECTING PREGNANCY IN THIRD TRIMESTER: Primary | ICD-10-CM

## 2020-12-28 DIAGNOSIS — Z3A.30 30 WEEKS GESTATION OF PREGNANCY: ICD-10-CM

## 2020-12-28 DIAGNOSIS — O98.513 COVID-19 AFFECTING PREGNANCY IN THIRD TRIMESTER: Primary | ICD-10-CM

## 2020-12-28 PROCEDURE — G0463 HOSPITAL OUTPT CLINIC VISIT: HCPCS

## 2020-12-28 PROCEDURE — 99214 OFFICE O/P EST MOD 30 MIN: CPT | Performed by: OBSTETRICS & GYNECOLOGY

## 2020-12-28 PROCEDURE — 1036F TOBACCO NON-USER: CPT | Performed by: OBSTETRICS & GYNECOLOGY

## 2020-12-28 PROCEDURE — NC001 PR NO CHARGE: Performed by: OBSTETRICS & GYNECOLOGY

## 2020-12-28 PROCEDURE — 99213 OFFICE O/P EST LOW 20 MIN: CPT

## 2020-12-28 PROCEDURE — 76815 OB US LIMITED FETUS(S): CPT | Performed by: STUDENT IN AN ORGANIZED HEALTH CARE EDUCATION/TRAINING PROGRAM

## 2020-12-28 NOTE — PROGRESS NOTES
Triage Note - OB  [place] Bk 21 y o  female MRN: [id number]  Unit/Bed#: LD [facility] File Encounter: [id number]    Chief Complaint:  Shortness of breath in the setting of positive COVID  VAHID: Estimated Date of Delivery: 3/4/21    HPI: Patient is a  at 30w4d here with known positive COVID diagnosed on  and now worsening shortness of breath and low O2 sat on home pulse ox  Patient reports that she was doing okay but then started feeling more short of breath  She then monitor her O2 sat and noticed it was 88-90s  She had a virtual visit with Dr Ruth Charles who advised her to go to L&D for further evaluation  Patient otherwise denies vaginal bleeding, leakage of fluid, contractions  She has good fetal movement  Vitals:   /59   Pulse 97   Temp 98 8 °F (37 1 °C) (Oral)   Resp 20   Ht 5' 5" (1 651 m)   Wt 89 4 kg (197 lb)   LMP 2020 (Exact Date)   SpO2 96%   BMI 32 78 kg/m²   Body mass index is 32 78 kg/m²  Physical Exam  GEN: resting comfortably  Lung: CTAB, no increased work of breathing  ABD: gravid, nontender  SVE:  deferred    FHT:  Baseline Rate: 130 bpm  Variability: Moderate 6-25 bpm  Accelerations: 15 x 15 or greater, With fetal movment, At variable times  Decelerations: None  TOCO:   Contraction Frequency (minutes): none  Contraction Quality: Not applicable    Labs: No results found for this or any previous visit (from the past 24 hour(s))  Imaging: CONNER: 17cm, vertex, posterior placenta    Lab, Imaging and other studies: I have personally reviewed pertinent reports  A/P: Patient is a 22 yo  at 30w4d who is here with concerns for worsening COVID  1) O2 sats were 96-97% on room air  Pulse ox was placed on patient's toenail due to nail polish on her fingers  Advised patient to place her home pulse ox on her toe nails  2) NST was reactive  CONNER was 17cm  3) Discharge instructions given to patient and labor precautions reviewed    4) D/W Dr Yessy Gaines Bernadine Shipman MD  12/28/2020  5:09 PM

## 2020-12-28 NOTE — DISCHARGE INSTRUCTIONS
Pregnancy at 27 to 30 [address]:   You may notice new symptoms such as shortness of breath, heartburn, or swelling of your ankles and feet  You may also have trouble sleeping or contractions  DISCHARGE INSTRUCTIONS:   Seek care immediately if:   · You develop a severe headache that does not go away  · You have new or increased vision changes, such as blurred or spotted vision  · You have new or increased swelling in your face or hands  · You have vaginal spotting or bleeding  · Your water broke or you feel warm water gushing or trickling from your vagina  Contact your healthcare provider if:   · You have more than 5 contractions in 1 hour  · You notice any changes in your baby's movements  · You have abdominal cramps, pressure, or tightening  · You have a change in vaginal discharge  · You have chills or a fever  · You have vaginal itching, burning, or pain  · You have yellow, green, white, or foul-smelling vaginal discharge  · You have pain or burning when you urinate, less urine than usual, or pink or bloody urine  · You have questions or concerns about your condition or care  How to care for yourself at this stage of your pregnancy:   · Eat a variety of healthy foods  Healthy foods include fruits, vegetables, whole-grain breads, low-fat dairy foods, beans, lean meats, and fish  Drink liquids as directed  Ask how much liquid to drink each day and which liquids are best for you  Limit caffeine to less than 200 milligrams each day  Limit your intake of fish to 2 servings each week  Choose fish low in mercury such as canned light tuna, shrimp, salmon, cod, or tilapia  Do not  eat fish high in mercury such as swordfish, tilefish, cee mackerel, and shark  · Manage heartburn  by eating 4 or 5 small meals each day instead of large meals  Avoid spicy food  · Manage swelling  by lying down and putting your feet up           · Take prenatal vitamins as directed  Your need for certain vitamins and minerals, such as folic acid, increases during pregnancy  Prenatal vitamins provide some of the extra vitamins and minerals you need  Prenatal vitamins may also help to decrease the risk of certain birth defects  · Talk to your healthcare provider about exercise  Moderate exercise can help you stay fit  Your healthcare provider will help you plan an exercise program that is safe for you during pregnancy  · Do not smoke  Smoking increases your risk of a miscarriage and other health problems during your pregnancy  Smoking can cause your baby to be born too early or weigh less at birth  Ask your healthcare provider for information if you need help quitting  · Do not drink alcohol  Alcohol passes from your body to your baby through the placenta  It can affect your baby's brain development and cause fetal alcohol syndrome (FAS)  FAS is a group of conditions that causes mental, behavior, and growth problems  · Talk to your healthcare provider before you take any medicines  Many medicines may harm your baby if you take them when you are pregnant  Do not take any medicines, vitamins, herbs, or supplements without first talking to your healthcare provider  Never use illegal or street drugs (such as marijuana or cocaine) while you are pregnant  Safety tips during pregnancy:   · Avoid hot tubs and saunas  Do not use a hot tub or sauna while you are pregnant, especially during your first trimester  Hot tubs and saunas may raise your baby's temperature and increase the risk of birth defects  · Avoid toxoplasmosis  This is an infection caused by eating raw meat or being around infected cat feces  It can cause birth defects, miscarriages, and other problems  Wash your hands after you touch raw meat  Make sure any meat is well-cooked before you eat it  Avoid raw eggs and unpasteurized milk   Use gloves or ask someone else to clean your cat's litter box while you are pregnant  Changes that are happening with your baby:  By 30 weeks, your baby may weigh more than 3 pounds  Your baby may be about 11 inches long from the top of the head to the rump (baby's bottom)  Your baby's eyes open and close now  Your baby's kicks and movements are more forceful at this time  What you need to know about prenatal care: Your healthcare provider will check your blood pressure and weight  You may also need the following:  · Blood tests  may be done to check for anemia or blood type  · A urine test  may also be done to check for sugar and protein  These can be signs of gestational diabetes or infection  Protein in your urine may also be a sign of preeclampsia  Preeclampsia is a condition that can develop during week 20 or later of your pregnancy  It causes high blood pressure, and it can cause problems with your kidneys and other organs  · A Tdap vaccine and flu vaccine  may be recommended by your healthcare provider  · A gestational diabetes screen  will be done using an oral glucose tolerance test (OGTT)  An OGTT starts with a blood sugar level check after you have not eaten for 8 hours  You are then given a glucose drink  Your blood sugar level is checked after 1 hour, 2 hours, and sometimes 3 hours  Healthcare providers look at how much your blood sugar level increases from the first check  · Fundal height  is a measurement of your uterus to check your baby's growth  This number is usually the same as the number of weeks that you have been pregnant  Your healthcare provider may also check your baby's position  · Your baby's heart rate  will be checked  © Copyright [address] Information is for End User's use only and may not be sold, redistributed or otherwise used for commercial purposes   All illustrations and images included in CareNotes® are the copyrighted property of A D A M , Inc  or [facility] Jay Helm   The above information is an  only  It is not intended as medical advice for individual conditions or treatments  Talk to your doctor, nurse or pharmacist before following any medical regimen to see if it is safe and effective for you

## 2020-12-28 NOTE — PROCEDURES
Jones Merchant, a [id number] at 30w4d with an VAHID of 3/4/2021, by Last Menstrual Period, was seen at [address] for the following procedure(s): $Procedure Type: CONNER]         4 Quadrant CONNER  CONNER Q1 (cm): 5 cm  CONNER Q2 (cm): 6 cm  CONNER Q3 (cm): 5 cm  CONNER Q4 (cm): 1 cm  CONNER TOTAL (cm): 17 cm               Sydna Bosworth  12/28/2020  6:19 PM

## 2020-12-29 ENCOUNTER — TELEPHONE (OUTPATIENT)
Dept: FAMILY MEDICINE CLINIC | Facility: CLINIC | Age: 20
End: 2020-12-29

## 2020-12-31 LAB
ALBUMIN SERPL-MCNC: 3.4 G/DL (ref 3.9–5)
ALBUMIN/GLOB SERPL: 1.5 {RATIO} (ref 1.2–2.2)
ALP SERPL-CCNC: 93 IU/L (ref 39–117)
ALT SERPL-CCNC: 10 IU/L (ref 0–32)
AST SERPL-CCNC: 15 IU/L (ref 0–40)
BILIRUB SERPL-MCNC: <0.2 MG/DL (ref 0–1.2)
BUN SERPL-MCNC: 4 MG/DL (ref 6–20)
BUN/CREAT SERPL: 7 (ref 9–23)
CALCIUM SERPL-MCNC: 8.2 MG/DL (ref 8.7–10.2)
CHLORIDE SERPL-SCNC: 105 MMOL/L (ref 96–106)
CO2 SERPL-SCNC: 24 MMOL/L (ref 20–29)
CREAT SERPL-MCNC: 0.57 MG/DL (ref 0.57–1)
ERYTHROCYTE [DISTWIDTH] IN BLOOD BY AUTOMATED COUNT: 12.3 % (ref 11.7–15.4)
GLOBULIN SER-MCNC: 2.3 G/DL (ref 1.5–4.5)
GLUCOSE SERPL-MCNC: 80 MG/DL (ref 65–99)
HCT VFR BLD AUTO: 30.1 % (ref 34–46.6)
HGB BLD-MCNC: 9.4 G/DL (ref 11.1–15.9)
MCH RBC QN AUTO: 26.2 PG (ref 26.6–33)
MCHC RBC AUTO-ENTMCNC: 31.2 G/DL (ref 31.5–35.7)
MCV RBC AUTO: 84 FL (ref 79–97)
PLATELET # BLD AUTO: 182 X10E3/UL (ref 150–450)
POTASSIUM SERPL-SCNC: 3.9 MMOL/L (ref 3.5–5.2)
PROT SERPL-MCNC: 5.7 G/DL (ref 6–8.5)
RBC # BLD AUTO: 3.59 X10E6/UL (ref 3.77–5.28)
SL AMB EGFR AFRICAN AMERICAN: 154 ML/MIN/1.73
SL AMB EGFR NON AFRICAN AMERICAN: 134 ML/MIN/1.73
SODIUM SERPL-SCNC: 139 MMOL/L (ref 134–144)
WBC # BLD AUTO: 6.1 X10E3/UL (ref 3.4–10.8)

## 2021-01-04 ENCOUNTER — TELEPHONE (OUTPATIENT)
Dept: OBGYN CLINIC | Facility: CLINIC | Age: 21
End: 2021-01-04

## 2021-01-04 NOTE — TELEPHONE ENCOUNTER
----- Message from Lashawn Weston MD sent at 1/4/2021 12:24 PM EST -----  Could help  I know the patient was not ecstatic about getting IV iron, particularly as she has gone through the whole subcu Zofran pump for nausea  If she is interested in that, we could proceed  Otherwise, would recheck CBC in 3-4 weeks time and we can reassess  Thanks,  Jason Dawson MD  ----- Message -----  From: Abdifatah Mcgovern RN  Sent: 1/4/2021  12:14 PM EST  To: Lashawn Weston MD    Just spoke with her  She has been taking the Ferrex 150 mg 3 x/day since prescribed mid Novemeber and her Iron continues to drop  I encouraged her to continue with this  Wondering when infusion treatments would be indicated

## 2021-01-06 ENCOUNTER — ROUTINE PRENATAL (OUTPATIENT)
Dept: OBGYN CLINIC | Facility: CLINIC | Age: 21
End: 2021-01-06
Payer: COMMERCIAL

## 2021-01-06 VITALS — WEIGHT: 198.6 LBS | SYSTOLIC BLOOD PRESSURE: 112 MMHG | DIASTOLIC BLOOD PRESSURE: 66 MMHG | BODY MASS INDEX: 33.05 KG/M2

## 2021-01-06 DIAGNOSIS — O99.013 ANEMIA AFFECTING PREGNANCY IN THIRD TRIMESTER: ICD-10-CM

## 2021-01-06 DIAGNOSIS — F32.A ANXIETY AND DEPRESSION: ICD-10-CM

## 2021-01-06 DIAGNOSIS — Z3A.32 32 WEEKS GESTATION OF PREGNANCY: Primary | ICD-10-CM

## 2021-01-06 DIAGNOSIS — Z3A.09 9 WEEKS GESTATION OF PREGNANCY: ICD-10-CM

## 2021-01-06 DIAGNOSIS — F41.9 ANXIETY AND DEPRESSION: ICD-10-CM

## 2021-01-06 LAB
SL AMB  POCT GLUCOSE, UA: NORMAL
SL AMB POCT URINE PROTEIN: NORMAL

## 2021-01-06 PROCEDURE — PNV: Performed by: OBSTETRICS & GYNECOLOGY

## 2021-01-06 PROCEDURE — 81002 URINALYSIS NONAUTO W/O SCOPE: CPT | Performed by: OBSTETRICS & GYNECOLOGY

## 2021-01-06 PROCEDURE — 90471 IMMUNIZATION ADMIN: CPT | Performed by: OBSTETRICS & GYNECOLOGY

## 2021-01-06 PROCEDURE — 59025 FETAL NON-STRESS TEST: CPT | Performed by: OBSTETRICS & GYNECOLOGY

## 2021-01-06 PROCEDURE — 90715 TDAP VACCINE 7 YRS/> IM: CPT | Performed by: OBSTETRICS & GYNECOLOGY

## 2021-01-06 RX ORDER — METOCLOPRAMIDE 5 MG/1
5 TABLET ORAL 4 TIMES DAILY
Qty: 20 TABLET | Refills: 3 | Status: SHIPPED | OUTPATIENT
Start: 2021-01-06 | End: 2021-03-03 | Stop reason: HOSPADM

## 2021-01-06 NOTE — PROGRESS NOTES
IUP at 31 weeks and 6 days  Patient early reported decreased fetal movement  We did do a fetal nonstress test which was reactive  Discussed signs of  labor and kick counts  She also has anemia has been taking her iron daily  Did have COVID-19 diagnosis in December, she has now, last and asymptomatic otherwise doing well  She does have a follow-up growth ultrasound scheduled for  2021  She will also continue using  Reglan intermittently she is now off the Zofran pump she also takes Zoloft 50 mg daily as well and is stable  Follow-up here in 2 weeks and p r n  she did receive Tdap vaccine today  She also had a flu shot in October

## 2021-01-22 ENCOUNTER — TELEPHONE (OUTPATIENT)
Dept: PERINATAL CARE | Facility: CLINIC | Age: 21
End: 2021-01-22

## 2021-01-22 ENCOUNTER — ROUTINE PRENATAL (OUTPATIENT)
Dept: OBGYN CLINIC | Facility: CLINIC | Age: 21
End: 2021-01-22
Payer: COMMERCIAL

## 2021-01-22 VITALS — SYSTOLIC BLOOD PRESSURE: 122 MMHG | BODY MASS INDEX: 33.51 KG/M2 | DIASTOLIC BLOOD PRESSURE: 80 MMHG | WEIGHT: 201.4 LBS

## 2021-01-22 DIAGNOSIS — Z3A.34 34 WEEKS GESTATION OF PREGNANCY: Primary | ICD-10-CM

## 2021-01-22 PROBLEM — K21.9 GERD (GASTROESOPHAGEAL REFLUX DISEASE): Status: ACTIVE | Noted: 2021-01-22

## 2021-01-22 LAB
SL AMB  POCT GLUCOSE, UA: NORMAL
SL AMB POCT URINE PROTEIN: NORMAL

## 2021-01-22 PROCEDURE — 81002 URINALYSIS NONAUTO W/O SCOPE: CPT | Performed by: OBSTETRICS & GYNECOLOGY

## 2021-01-22 PROCEDURE — PNV: Performed by: OBSTETRICS & GYNECOLOGY

## 2021-01-22 NOTE — PATIENT INSTRUCTIONS
Pregnancy at 28 to [address]:   What changes are happening in my body? You are considered full term at the beginning of 37 weeks  Your breathing may be easier if your baby has moved down into a head-down position  You may need to urinate more often because the baby may be pressing on your bladder  You may also feel more discomfort and get tired easily  How do I care for myself at this stage of my pregnancy? · Eat a variety of healthy foods  Healthy foods include fruits, vegetables, whole-grain breads, low-fat dairy foods, beans, lean meats, and fish  Drink liquids as directed  Ask how much liquid to drink each day and which liquids are best for you  Limit caffeine to less than 200 milligrams each day  Limit your intake of fish to 2 servings each week  Choose fish low in mercury such as canned light tuna, shrimp, salmon, cod, or tilapia  Do not  eat fish high in mercury such as swordfish, tilefish, cee mackerel, and shark  · Take prenatal vitamins as directed  Your need for certain vitamins and minerals, such as folic acid, increases during pregnancy  Prenatal vitamins provide some of the extra vitamins and minerals you need  Prenatal vitamins may also help to decrease the risk of certain birth defects  · Rest as needed  Put your feet up if you have swelling in your ankles and feet  · Do not smoke  Smoking increases your risk of a miscarriage and other health problems during your pregnancy  Smoking can cause your baby to be born early or weigh less at birth  Ask your healthcare provider for information if you need help quitting  · Do not drink alcohol  Alcohol passes from your body to your baby through the placenta  It can affect your baby's brain development and cause fetal alcohol syndrome (FAS)  FAS is a group of conditions that causes mental, behavior, and growth problems  · Talk to your healthcare provider before you take any medicines    Many medicines may harm your baby if you take them when you are pregnant  Do not take any medicines, vitamins, herbs, or supplements without first talking to your healthcare provider  Never use illegal or street drugs (such as marijuana or cocaine) while you are pregnant  · Talk to your healthcare provider before you travel  You may not be able to travel in an airplane after 36 weeks  He may also recommend that you avoid long road trips  What are some safety tips during pregnancy? · Avoid hot tubs and saunas  Do not use a hot tub or sauna while you are pregnant, especially during your first trimester  Hot tubs and saunas may raise your baby's temperature and increase the risk of birth defects  · Avoid toxoplasmosis  This is an infection caused by eating raw meat or being around infected cat feces  It can cause birth defects, miscarriages, and other problems  Wash your hands after you touch raw meat  Make sure any meat is well-cooked before you eat it  Avoid raw eggs and unpasteurized milk  Use gloves or ask someone else to clean your cat's litter box while you are pregnant  · Ask your healthcare provider about travel  The most comfortable time to travel is during the second trimester  Ask your healthcare provider if you can travel after 36 weeks  You may not be able to travel in an airplane after 36 weeks  He may also recommend that you avoid long road trips  What changes are happening with my baby? By 38 weeks, your baby may weigh between 6 and 9 pounds  Your baby may be about 14 inches long from the top of the head to the rump (baby's bottom)  Your baby hears well enough to know your voice  As your baby gets larger, you may feel fewer kicks and more stretching and rolling  Your baby may move into a head-down position  Your baby will also rest lower in your abdomen  What do I need to know about prenatal care? Your healthcare provider will check your blood pressure and weight   You may also need the following:  · A urine test  may also be done to check for sugar and protein  These can be signs of gestational diabetes or infection  Protein in your urine may also be a sign of preeclampsia  Preeclampsia is a condition that can develop during week 20 or later of your pregnancy  It causes high blood pressure, and it can cause problems with your kidneys and other organs  · A blood test  may be done to check for anemia (low iron level)  · A Tdap vaccine  may be recommended by your healthcare provider  · A group B strep test  is a test that is done to check for group B strep infection  Group B strep is a type of bacteria that may be found in the vagina or rectum  It can be passed to your baby during delivery if you have it  Your healthcare provider will take swab your vagina or rectum and send the sample to the lab for tests  · Fundal height  is a measurement of your uterus to check your baby's growth  This number is usually the same as the number of weeks that you have been pregnant  Your healthcare provider may also check your baby's position  · Your baby's heart rate  will be checked  When should I seek immediate care? · You develop a severe headache that does not go away  · You have new or increased vision changes, such as blurred or spotted vision  · You have new or increased swelling in your face or hands  · You have vaginal spotting or bleeding  · Your water broke or you feel warm water gushing or trickling from your vagina  When should I contact my healthcare provider? · You have more than 5 contractions in 1 hour  · You notice any changes in your baby's movements  · You have abdominal cramps, pressure, or tightening  · You have a change in vaginal discharge  · You have chills or a fever  · You have vaginal itching, burning, or pain  · You have yellow, green, white, or foul-smelling vaginal discharge      · You have pain or burning when you urinate, less urine than usual, or pink or bloody urine  · You have questions or concerns about your condition or care  CARE AGREEMENT:   You have the right to help plan your care  Learn about your health condition and how it may be treated  Discuss treatment options with your healthcare providers to decide what care you want to receive  You always have the right to refuse treatment  The above information is an  only  It is not intended as medical advice for individual conditions or treatments  Talk to your doctor, nurse or pharmacist before following any medical regimen to see if it is safe and effective for you  © Copyright [address] [place] [address] Information is for End User's use only and may not be sold, redistributed or otherwise used for commercial purposes   All illustrations and images included in CareNotes® are the copyrighted property of A D A M , Inc  or [address] [place] [address]

## 2021-01-22 NOTE — Clinical Note
Osman Lopez was seen today for 34 week pregnancy visit  She notes some chest discomfort, particularly while lying down  Lungs were clear to auscultation and heart was regular rate and rhythm  Likely, it is related to her chronic GERD with advanced pregnancy  Suggested she follow up with you to make sure no potential cardiac etiology    Thanks,  Babatunde Evans MD

## 2021-01-22 NOTE — PROGRESS NOTES
Patient was seen today for prenatal visit  1  34 1 weeks-good fetal movement noted  Fetal movement and  labor precautions were reviewed  Follow-up 2 weeks time for prenatal visit with group B strep  Has MFM follow-up next week  Previous ultrasound from 20 was okay  2  Chest discomfort-she has noted this particularly while lying down  Likely, it is related to her GERD which is a chronic problem  Her lungs were clear to auscultation and heart was regular rate and rhythm today  Recommended she follow-up with her primary care doctor to rule out potential cardiac source  Also suggested she follow up with GI regarding GERD recommendations    3  History of depression-continues on Zoloft  4  Anemia-hemoglobin 9 4 x2, was 9 9 and 10 8 previously  She continues with iron twice daily  Again, reviewed IV iron she wishes to defer this  5  Family history of factor 5-patient negative  6  Other-patient states easy bruisability  No bruising was noted today  Platelet count has been normal   To follow-up

## 2021-01-22 NOTE — TELEPHONE ENCOUNTER
-------------------------------------------------------------    Attempted to reach patient by phone and left voicemail to confirm appointment for MFM ultrasound  1 support person ( must be over the age of 15) may accompany you for your appointment  If you or your support person have traveled outside the state in the past 2 weeks, please call and notify our office today #[phone]  You and your support person must wear a mask ,covering nose and mouth,during your entire visit  To minimize your exposure in our waiting room, please call our office prior to entering the building  Check in and rooming questions will be done via phone  We will give you directions when to enter for your appointment  [place]: [phone]    IF you are not feeling well- cough, fever, shortness of breath or any flu like symptoms, contact your primary care physician or [address] [place] [address]  If you are awaiting COVID 19 test results please call and reschedule your appointment    Any questions with these instructions please call Maternal Fetal Medicine nurse line today @ # [phone]

## 2021-01-24 NOTE — PROGRESS NOTES
Please refer to the [facility] ultrasound report in Ob Procedures for additional information regarding today's visit

## 2021-01-25 ENCOUNTER — ULTRASOUND (OUTPATIENT)
Dept: PERINATAL CARE | Facility: OTHER | Age: 21
End: 2021-01-25
Payer: COMMERCIAL

## 2021-01-25 VITALS
WEIGHT: 203 LBS | HEART RATE: 91 BPM | HEIGHT: 65 IN | SYSTOLIC BLOOD PRESSURE: 109 MMHG | DIASTOLIC BLOOD PRESSURE: 72 MMHG | BODY MASS INDEX: 33.82 KG/M2

## 2021-01-25 DIAGNOSIS — O43.193 MARGINAL INSERTION OF UMBILICAL CORD AFFECTING MANAGEMENT OF MOTHER IN THIRD TRIMESTER: Primary | ICD-10-CM

## 2021-01-25 DIAGNOSIS — U07.1 COVID-19 AFFECTING PREGNANCY IN THIRD TRIMESTER: ICD-10-CM

## 2021-01-25 DIAGNOSIS — Z3A.34 34 WEEKS GESTATION OF PREGNANCY: ICD-10-CM

## 2021-01-25 DIAGNOSIS — O98.513 COVID-19 AFFECTING PREGNANCY IN THIRD TRIMESTER: ICD-10-CM

## 2021-01-25 PROCEDURE — 76816 OB US FOLLOW-UP PER FETUS: CPT | Performed by: OBSTETRICS & GYNECOLOGY

## 2021-01-25 PROCEDURE — 99213 OFFICE O/P EST LOW 20 MIN: CPT | Performed by: OBSTETRICS & GYNECOLOGY

## 2021-01-25 PROCEDURE — 3008F BODY MASS INDEX DOCD: CPT | Performed by: OBSTETRICS & GYNECOLOGY

## 2021-01-25 PROCEDURE — 1036F TOBACCO NON-USER: CPT | Performed by: OBSTETRICS & GYNECOLOGY

## 2021-01-25 NOTE — PATIENT INSTRUCTIONS
Kick Counts in Pregnancy   WHAT YOU NEED TO KNOW:   Kick counts measure how much your baby is moving in your womb  A kick from your baby can be felt as a twist, turn, swish, roll, or jab  It is common to feel your baby kicking at 26 to 28 weeks of pregnancy  You may feel your baby kick as early as 20 weeks of pregnancy  You may want to start counting at 28 weeks  DISCHARGE INSTRUCTIONS:   Contact your healthcare provider immediately if:   · You feel a change in the number of kicks or movements of your baby  · You feel fewer than 10 kicks within 2 hours  · You have questions or concerns about your baby's movements  Why measure kick counts:  Your baby's movement may provide information about your baby's health  He or she may move less, or not at all, if there are problems  Your baby may move less if he or she is not getting enough oxygen or nutrition from the placenta  Do not smoke while you are pregnant  Smoking decreases the amount of oxygen that gets to your baby  Talk to your healthcare provider if you need help to quit smoking  Tell your healthcare provider as soon as you feel a change in your baby's movements  When to measure kick counts:   · Measure kick counts at the same time every day  · Measure kick counts when your baby is awake and most active  Your baby may be most active in the evening  How to measure kick counts:  Check that your baby is awake before you measure kick counts  You can wake up your baby by lightly pushing on your belly, walking, or drinking something cold  Your healthcare provider may tell you different ways to measure kick counts  You may be told to do the following:  · Use a chart or clock to keep track of the time you start and finish counting  · Sit in a chair or lie on your left side  · Place your hands on the largest part of your belly  · Count until you reach 10 kicks  Write down how much time it takes to count 10 kicks       · It may take 30 minutes to 2 hours to count 10 kicks  It should not take more than 2 hours to count 10 kicks  Follow up with your healthcare provider as directed:  Write down your questions so you remember to ask them during your visits  © Copyright [address] Information is for End User's use only and may not be sold, redistributed or otherwise used for commercial purposes  All illustrations and images included in CareNotes® are the copyrighted property of A D A M , Inc  or [facility]   The above information is an  only  It is not intended as medical advice for individual conditions or treatments  Talk to your doctor, nurse or pharmacist before following any medical regimen to see if it is safe and effective for you

## 2021-01-25 NOTE — LETTER
January 25, 2021     Dalton Amado MD  [address] [facility]    Patient: Marleen Givens   YOB: 2000   Date of Visit: 1/25/2021       Dear Dr Tom Coulter: Thank you for referring Irma Yeager to me for evaluation  Below are my notes for this consultation  If you have questions, please do not hesitate to call me  I look forward to following your patient along with you  Sincerely,        Albertina Ring MD        CC: No Recipients  Albertina Ring MD  1/24/2021 12:25 PM  Sign when Signing Visit  Please refer to the [facility] ultrasound report in Ob Procedures for additional information regarding today's visit

## 2021-02-04 ENCOUNTER — ROUTINE PRENATAL (OUTPATIENT)
Dept: OBGYN CLINIC | Facility: CLINIC | Age: 21
End: 2021-02-04
Payer: COMMERCIAL

## 2021-02-04 VITALS — SYSTOLIC BLOOD PRESSURE: 118 MMHG | DIASTOLIC BLOOD PRESSURE: 80 MMHG | BODY MASS INDEX: 33.22 KG/M2 | WEIGHT: 199.6 LBS

## 2021-02-04 DIAGNOSIS — O99.019 ANEMIA AFFECTING PREGNANCY, ANTEPARTUM: ICD-10-CM

## 2021-02-04 DIAGNOSIS — Z3A.36 36 WEEKS GESTATION OF PREGNANCY: Primary | ICD-10-CM

## 2021-02-04 DIAGNOSIS — K21.9 GASTROESOPHAGEAL REFLUX DISEASE, UNSPECIFIED WHETHER ESOPHAGITIS PRESENT: ICD-10-CM

## 2021-02-04 LAB
SL AMB  POCT GLUCOSE, UA: NORMAL
SL AMB POCT URINE PROTEIN: NORMAL

## 2021-02-04 PROCEDURE — 81002 URINALYSIS NONAUTO W/O SCOPE: CPT | Performed by: OBSTETRICS & GYNECOLOGY

## 2021-02-04 PROCEDURE — 87653 STREP B DNA AMP PROBE: CPT | Performed by: OBSTETRICS & GYNECOLOGY

## 2021-02-04 PROCEDURE — PNV: Performed by: OBSTETRICS & GYNECOLOGY

## 2021-02-04 NOTE — PATIENT INSTRUCTIONS
Anemia   WHAT YOU NEED TO KNOW:   What is anemia? Anemia is a low number of red blood cells or a low amount of hemoglobin in your red blood cells  Hemoglobin is a protein that helps carry oxygen throughout your body  Red blood cells use iron to create hemoglobin  Anemia may develop if your body does not have enough iron  It may also develop if your body does not make enough red blood cells or they die faster than your body can make them  What increases my risk for anemia? · Trauma or surgery that causes massive blood loss    · A gastrointestinal bleed    · A woman's monthly period    · A family history of blood disease or anemia    · Liver or kidney disease, cancer, rheumatoid arthritis, or hyperthyroidism    · Alcohol abuse    · Lack of foods that contain iron, folic acid, or vitamin B12    What are the signs and symptoms of anemia? · Chest pain or a fast heartbeat    · Lightheadedness, dizziness, or shortness of breath    · Cold or pale skin    · Tiredness, weakness, or confusion    How is anemia diagnosed? Blood tests will show if you have anemia  How is anemia treated? Treatment depends on the type of anemia you have  You may need any of the following:  · Iron or folic acid supplements  help increase your red blood cell and hemoglobin levels  · Vitamin B12 injections  may help boost your red blood cell count and decrease your symptoms  · A blood transfusion  may be needed if your body cannot replace the blood you have lost     · Surgery  may be needed to stop bleeding, or if your anemia is severe  How can I prevent anemia? Eat healthy foods rich in iron and vitamin C  Nuts, meat, dark leafy green vegetables, and beans are high in iron and protein  Vitamin C helps your body absorb iron  Foods rich in vitamin C include oranges and other citrus fruits  Ask your healthcare provider for a list of other foods that are high in iron or vitamin C  Ask if you need to be on a special diet     Call [phone] or have someone call 911 for any of the following:   · You lose consciousness  · You have severe chest pain  When should I seek immediate care? · You have dark or bloody bowel movements  When should I contact my healthcare provider? · Your symptoms are worse, even after treatment  · You have questions or concerns about your condition or care  CARE AGREEMENT:   You have the right to help plan your care  Learn about your health condition and how it may be treated  Discuss treatment options with your healthcare providers to decide what care you want to receive  You always have the right to refuse treatment  The above information is an  only  It is not intended as medical advice for individual conditions or treatments  Talk to your doctor, nurse or pharmacist before following any medical regimen to see if it is safe and effective for you  © Copyright [address] Information is for End User's use only and may not be sold, redistributed or otherwise used for commercial purposes  All illustrations and images included in CareNotes® are the copyrighted property of A D A M , Inc  or [facility] Hel[facility]   Pregnancy at 28 to 38 Weeks   WHAT YOU NEED TO KNOW:   What changes are happening in my body? You are considered full term at the beginning of 37 weeks  Your breathing may be easier if your baby has moved down into a head-down position  You may need to urinate more often because the baby may be pressing on your bladder  You may also feel more discomfort and get tired easily  How do I care for myself at this stage of my pregnancy? · Eat a variety of healthy foods  Healthy foods include fruits, vegetables, whole-grain breads, low-fat dairy foods, beans, lean meats, and fish  Drink liquids as directed  Ask how much liquid to drink each day and which liquids are best for you  Limit caffeine to less than 200 milligrams each day  Limit your intake of fish to 2 servings each week  Choose fish low in mercury such as canned light tuna, shrimp, salmon, cod, or tilapia  Do not  eat fish high in mercury such as swordfish, tilefish, cee mackerel, and shark  · Take prenatal vitamins as directed  Your need for certain vitamins and minerals, such as folic acid, increases during pregnancy  Prenatal vitamins provide some of the extra vitamins and minerals you need  Prenatal vitamins may also help to decrease the risk of certain birth defects  · Rest as needed  Put your feet up if you have swelling in your ankles and feet  · Do not smoke  Smoking increases your risk of a miscarriage and other health problems during your pregnancy  Smoking can cause your baby to be born early or weigh less at birth  Ask your healthcare provider for information if you need help quitting  · Do not drink alcohol  Alcohol passes from your body to your baby through the placenta  It can affect your baby's brain development and cause fetal alcohol syndrome (FAS)  FAS is a group of conditions that causes mental, behavior, and growth problems  · Talk to your healthcare provider before you take any medicines  Many medicines may harm your baby if you take them when you are pregnant  Do not take any medicines, vitamins, herbs, or supplements without first talking to your healthcare provider  Never use illegal or street drugs (such as marijuana or cocaine) while you are pregnant  · Talk to your healthcare provider before you travel  You may not be able to travel in an airplane after 36 weeks  He may also recommend that you avoid long road trips  What are some safety tips during pregnancy? · Avoid hot tubs and saunas  Do not use a hot tub or sauna while you are pregnant, especially during your first trimester  Hot tubs and saunas may raise your baby's temperature and increase the risk of birth defects  · Avoid toxoplasmosis    This is an infection caused by eating raw meat or being around infected cat feces  It can cause birth defects, miscarriages, and other problems  Wash your hands after you touch raw meat  Make sure any meat is well-cooked before you eat it  Avoid raw eggs and unpasteurized milk  Use gloves or ask someone else to clean your cat's litter box while you are pregnant  · Ask your healthcare provider about travel  The most comfortable time to travel is during the second trimester  Ask your healthcare provider if you can travel after 36 weeks  You may not be able to travel in an airplane after 36 weeks  He may also recommend that you avoid long road trips  What changes are happening with my baby? By 38 weeks, your baby may weigh between 6 and 9 pounds  Your baby may be about 14 inches long from the top of the head to the rump (baby's bottom)  Your baby hears well enough to know your voice  As your baby gets larger, you may feel fewer kicks and more stretching and rolling  Your baby may move into a head-down position  Your baby will also rest lower in your abdomen  What do I need to know about prenatal care? Your healthcare provider will check your blood pressure and weight  You may also need the following:  · A urine test  may also be done to check for sugar and protein  These can be signs of gestational diabetes or infection  Protein in your urine may also be a sign of preeclampsia  Preeclampsia is a condition that can develop during week 20 or later of your pregnancy  It causes high blood pressure, and it can cause problems with your kidneys and other organs  · A blood test  may be done to check for anemia (low iron level)  · A Tdap vaccine  may be recommended by your healthcare provider  · A group B strep test  is a test that is done to check for group B strep infection  Group B strep is a type of bacteria that may be found in the vagina or rectum  It can be passed to your baby during delivery if you have it   Your healthcare provider will take swab your vagina or rectum and send the sample to the lab for tests  · Fundal height  is a measurement of your uterus to check your baby's growth  This number is usually the same as the number of weeks that you have been pregnant  Your healthcare provider may also check your baby's position  · Your baby's heart rate  will be checked  When should I seek immediate care? · You develop a severe headache that does not go away  · You have new or increased vision changes, such as blurred or spotted vision  · You have new or increased swelling in your face or hands  · You have vaginal spotting or bleeding  · Your water broke or you feel warm water gushing or trickling from your vagina  When should I contact my healthcare provider? · You have more than 5 contractions in 1 hour  · You notice any changes in your baby's movements  · You have abdominal cramps, pressure, or tightening  · You have a change in vaginal discharge  · You have chills or a fever  · You have vaginal itching, burning, or pain  · You have yellow, green, white, or foul-smelling vaginal discharge  · You have pain or burning when you urinate, less urine than usual, or pink or bloody urine  · You have questions or concerns about your condition or care  CARE AGREEMENT:   You have the right to help plan your care  Learn about your health condition and how it may be treated  Discuss treatment options with your healthcare providers to decide what care you want to receive  You always have the right to refuse treatment  The above information is an  only  It is not intended as medical advice for individual conditions or treatments  Talk to your doctor, nurse or pharmacist before following any medical regimen to see if it is safe and effective for you  © Copyright [address] Information is for End User's use only and may not be sold, redistributed or otherwise used for commercial purposes   All illustrations and images included in CareNotes® are the copyrighted property of A D A M , Inc  or [employer]

## 2021-02-04 NOTE — PROGRESS NOTES
Patient seen today for prenatal visit  1  36 0 weeks-good fetal movement noted  Fetal movement and /labor precautions were reviewed  Group B strep was done today  Follow-up 1 week prenatal   2  Chest discomfort-still notes this intermittently  Likely is a GI source given her long history of GERD  She does have appointment in the near future with her primary doctor to rule out any other etiology  Strongly, strongly suggested she call or go to the ER if any severe chest pain or shortness of breath  3  History of depression-continues on Zoloft  4  Anemia-hemoglobin 9 4 on last 2 blood draws, was 9 9 in 10 8 previously  Continues on iron twice daily  Wishes to defer IV iron  5  Family history factor 5-patient is negative  6  Long history of [id number] as per GI

## 2021-02-06 LAB — GP B STREP DNA SPEC QL NAA+PROBE: NORMAL

## 2021-02-06 NOTE — PROGRESS NOTES
ASSESSMENT/PLAN:    Shortness of breath  Reasons are multifactorial  Gravid uterus, history of asthma, reflux  EKG today  Exam is normal  Patient most likely will have all these things resolve once she gives birth to her baby son  If she becomes worse she will let us know or go to the nearest emergency room  If it does not resolve after the birth of her son she will let us know    Depression  Patient will maintain on Zoloft continue the same    GERD  Continue Protonix and sucralfate          Health Maintenance   Topic Date Due    Pneumococcal Vaccine: Pediatrics (0 to 5 Years) and At-Risk Patients (6 to 59 Years) (1 of 1 - PPSV23) 07/19/2006    HPV Vaccine (1 - 2-dose series) 07/19/2011    Hepatitis A Vaccine (2 of 2 - 2-dose series) 05/21/2013    Annual Physical  03/10/2021    Chlamydia Screening  07/28/2021    BMI: Adult  02/09/2022    Depression Remission PHQ  02/09/2022    DTaP,Tdap,and Td Vaccines (5 - Td) 01/06/2031    HIV Screening  Completed    Hepatitis C Screening  Completed    HIB Vaccine  Completed    Hepatitis B Vaccine  Completed    IPV Vaccine  Completed    Influenza Vaccine  Completed    Meningococcal ACWY Vaccine  Aged Out         Problem List as of 2/9/2021 Reviewed: 2/4/2021 10:29 AM by Archie Aldrich MD    36 weeks gestation of pregnancy    Anemia affecting pregnancy, antepartum    COVID-19 affecting pregnancy in third trimester    GERD (gastroesophageal reflux disease)    Major depressive disorder, recurrent severe without psychotic features ([facility] [address] )    Post traumatic stress disorder (PTSD)    Premenstrual symptom            Subjective:   Chief Complaint   Patient presents with    Follow-up     Patient is here at the request of her OBGYN to check her for some shortness of breath  In December patient had COVID that included decrease taste and smell as well as appetite  She also had sinus congestion but she is also pregnant, currently almost 37 weeks  She did have a bit of her asthma kick up with some chest pain  She did have 1 episode only of low pulse ox at 88% that quickly returned back to normal    Now she gets some chest pressure mostly at night when she lays down where she feels like someone sitting on her chest and she has some trouble breathing or taking deep breaths  She does have a strong history of reflux and is on both Protonix and Carafate  Otherwise she does feel fine without any other issues  patient ID: Zachary Elkins is a 21 y o  female  Past Medical History:   Diagnosis Date    Abnormal ECG     no diagnosis    Anemia     Anxiety     Asthma     Depression     Endometriosis     Frequent sinus infections     GERD (gastroesophageal reflux disease)     Migraine     Syncope     Trauma     Varicella     vaccine     Past Surgical History:   Procedure Laterality Date    TONSILLECTOMY  2006    UPPER GASTROINTESTINAL ENDOSCOPY  05/18/2017    Dysphagia, dilated to [address]   UPPER GASTROINTESTINAL ENDOSCOPY  10/07/2016    Dysphagia, dilated to [address]  Biopsies negative for eosinophilic esophagitis    UPPER GASTROINTESTINAL ENDOSCOPY  02/22/2018    Dysphagia, dilated to [address]    Biopsies negative for eosinophilic esophagitis    WISDOM TOOTH EXTRACTION       Family History   Problem Relation Age of Onset    Hypertension Mother    Finn Bones Arthritis Mother     Colon polyps Mother     Hyperlipidemia Father     Hypertension Maternal Grandfather     Stroke Maternal Grandfather     Myasthenia gravis Maternal Grandfather     Diabetes Paternal Grandfather     Stroke Paternal Grandfather     Cancer Paternal Grandfather     Bipolar disorder Sister     Suicide Attempts Sister     Mental illness Other     Colon cancer Other     Thyroid disease Maternal Grandmother     Colon polyps Maternal Grandmother     Bipolar disorder Paternal Aunt     Drug abuse Paternal Aunt     Eating disorder Paternal [de-identified]     Lung cancer Maternal Uncle     Substance Abuse Neg Hx      Social History     Tobacco Use    Smoking status: Never Smoker    Smokeless tobacco: Never Used   Substance Use Topics    Alcohol use: Not Currently     Comment: social    Drug use: Not Currently     Types: Marijuana     Comment: last time a year ago      Social History     Tobacco Use   Smoking Status Never Smoker   Smokeless Tobacco Never Used        MED LIST WAS REVIEWED AND UPDATED       ROS  As per HPI  Rest of 12 point review of systems negative     Objective:      VITALS:  Wt Readings from Last 3 Encounters:   02/09/21 91 9 kg (202 lb 8 oz)   02/04/21 90 5 kg (199 lb 9 6 oz)   01/25/21 92 1 kg (203 lb)     BP Readings from Last 3 Encounters:   02/09/21 120/80   02/04/21 118/80   01/25/21 109/72     Pulse Readings from Last 3 Encounters:   02/09/21 90   01/25/21 91   12/28/20 97     Body mass index is 33 19 kg/m²  Laboratory Results:    All pertinent labs and studies were reviewed with patient during this office visit  with highlights of the results contained in this notes ASSESSMENT AND PLAN section       Physical Exam  Constitutional  Appears healthy, Looks well, Appearance consistent with age    Mental Status  Alert, Oriented, Cooperative, Memory function normal , clean, and reasonable    Neck  No neck mass, No thyromegaly, Good carotid upstrokes bilaterally, trachea midline positive click    Respiratory  Breath sounds normal, No rales, No rhonchi, No wheezing, normal palpation    Cardiac   Regular rhythm without ectopy or murmur no S3-S4, no heave lift or thrill to palpation    Abdomen  Patient is gravid with baby in a head-down position according to her gyn  She is in no acute distress  Fundus of the uterus is up to her xiphoid process    Vascular  No leg edema, No pedal edema    Muscular skeletal  No clubbing cyanosis , muscle tone normal    Skin  No appreciable rashes or abnormal appearing lesions

## 2021-02-07 DIAGNOSIS — D64.9 ANEMIA, UNSPECIFIED TYPE: ICD-10-CM

## 2021-02-08 RX ORDER — IRON POLYSACCHARIDE COMPLEX 150 MG
CAPSULE ORAL
Qty: 180 CAPSULE | Refills: 0 | Status: SHIPPED | OUTPATIENT
Start: 2021-02-08 | End: 2021-04-27 | Stop reason: SDUPTHER

## 2021-02-09 ENCOUNTER — OFFICE VISIT (OUTPATIENT)
Dept: FAMILY MEDICINE CLINIC | Facility: CLINIC | Age: 21
End: 2021-02-09
Payer: COMMERCIAL

## 2021-02-09 VITALS
TEMPERATURE: 98.1 F | SYSTOLIC BLOOD PRESSURE: 120 MMHG | WEIGHT: 202.5 LBS | DIASTOLIC BLOOD PRESSURE: 80 MMHG | HEART RATE: 90 BPM | BODY MASS INDEX: 32.54 KG/M2 | RESPIRATION RATE: 16 BRPM | HEIGHT: 66 IN

## 2021-02-09 DIAGNOSIS — Z3A.36 36 WEEKS GESTATION OF PREGNANCY: Primary | ICD-10-CM

## 2021-02-09 DIAGNOSIS — R06.02 SHORTNESS OF BREATH: ICD-10-CM

## 2021-02-09 DIAGNOSIS — K21.9 GASTROESOPHAGEAL REFLUX DISEASE, UNSPECIFIED WHETHER ESOPHAGITIS PRESENT: ICD-10-CM

## 2021-02-09 DIAGNOSIS — F33.2 MAJOR DEPRESSIVE DISORDER, RECURRENT SEVERE WITHOUT PSYCHOTIC FEATURES (HCC): ICD-10-CM

## 2021-02-09 PROCEDURE — 3008F BODY MASS INDEX DOCD: CPT | Performed by: PEDIATRICS

## 2021-02-09 PROCEDURE — 3725F SCREEN DEPRESSION PERFORMED: CPT | Performed by: FAMILY MEDICINE

## 2021-02-09 PROCEDURE — 99214 OFFICE O/P EST MOD 30 MIN: CPT | Performed by: FAMILY MEDICINE

## 2021-02-10 ENCOUNTER — ROUTINE PRENATAL (OUTPATIENT)
Dept: OBGYN CLINIC | Facility: CLINIC | Age: 21
End: 2021-02-10
Payer: COMMERCIAL

## 2021-02-10 VITALS — DIASTOLIC BLOOD PRESSURE: 62 MMHG | SYSTOLIC BLOOD PRESSURE: 118 MMHG | WEIGHT: 203.4 LBS | BODY MASS INDEX: 33.33 KG/M2

## 2021-02-10 DIAGNOSIS — F41.9 ANXIETY AND DEPRESSION: ICD-10-CM

## 2021-02-10 DIAGNOSIS — Z86.16 HISTORY OF COVID-19: ICD-10-CM

## 2021-02-10 DIAGNOSIS — Z3A.36 36 WEEKS GESTATION OF PREGNANCY: Primary | ICD-10-CM

## 2021-02-10 DIAGNOSIS — F32.A ANXIETY AND DEPRESSION: ICD-10-CM

## 2021-02-10 DIAGNOSIS — O99.013 ANEMIA AFFECTING PREGNANCY IN THIRD TRIMESTER: ICD-10-CM

## 2021-02-10 LAB
SL AMB  POCT GLUCOSE, UA: NORMAL
SL AMB POCT URINE PROTEIN: NORMAL

## 2021-02-10 PROCEDURE — 81002 URINALYSIS NONAUTO W/O SCOPE: CPT | Performed by: OBSTETRICS & GYNECOLOGY

## 2021-02-10 PROCEDURE — PNV: Performed by: OBSTETRICS & GYNECOLOGY

## 2021-02-10 NOTE — PROGRESS NOTES
IUP at 36 weeks and 6 days  Patient reports positive fetal movements, denies contractions leakage fluid or bleeding  She does feel like she lost her mucus plug and having some some mucousy discharge  Reviewed signs of  labor and kick counts  She did have her GBS done which was negative  She is taking 1 baby aspirin days she is on iron supplement for her anemia  Also contain taking Zoloft feels well

## 2021-02-16 ENCOUNTER — TELEPHONE (OUTPATIENT)
Dept: OBGYN CLINIC | Facility: CLINIC | Age: 21
End: 2021-02-16

## 2021-02-16 ENCOUNTER — HOSPITAL ENCOUNTER (OUTPATIENT)
Facility: HOSPITAL | Age: 21
Discharge: HOME | End: 2021-02-16
Attending: OBSTETRICS & GYNECOLOGY | Admitting: OBSTETRICS & GYNECOLOGY
Payer: COMMERCIAL

## 2021-02-16 VITALS
DIASTOLIC BLOOD PRESSURE: 69 MMHG | HEART RATE: 108 BPM | HEIGHT: 65 IN | BODY MASS INDEX: 33.89 KG/M2 | RESPIRATION RATE: 18 BRPM | WEIGHT: 203.4 LBS | TEMPERATURE: 97.7 F | SYSTOLIC BLOOD PRESSURE: 118 MMHG

## 2021-02-16 PROBLEM — Z3A.37 37 WEEKS GESTATION OF PREGNANCY: Status: ACTIVE | Noted: 2020-08-01

## 2021-02-16 PROCEDURE — NC001 PR NO CHARGE: Performed by: OBSTETRICS & GYNECOLOGY

## 2021-02-16 PROCEDURE — 99212 OFFICE O/P EST SF 10 MIN: CPT

## 2021-02-16 PROCEDURE — G0463 HOSPITAL OUTPT CLINIC VISIT: HCPCS

## 2021-02-16 NOTE — DISCHARGE INSTRUCTIONS
Early Labor Signs   WHAT YOU SHOULD KNOW:   Early labor signs are changes in your body that allow your baby to pass through your birth canal   AFTER YOU LEAVE:   Signs and symptoms of early labor:   · Lightening  occurs when your baby drops inside your pelvis  You may feel increased pressure in your pelvis  This may happen a few weeks to a few hours before your labor begins  · Contractions  are cramps and tightening that occur in your uterus to help move the baby through your birth canal  Contractions occur regularly and more often each time  Each one lasts about 30 to 70 seconds, and gets stronger and more painful until you deliver your baby  Contractions do not go away with movement  They start in your lower back and move to the front in your abdomen  · Effacement  occurs when your cervix softens and thins, so it can easily open for the baby  Your primary healthcare provider [facility] or obstetrician will examine your cervix for effacement  · Dilation  is widening of your cervix, also for the baby's passage  Your PHP or obstetrician will examine your cervix for dilation  Your cervix will be fully opened and ready for delivery when it is dilated to 10 centimeters  · Increased discharge  from your vagina may occur  It may be pink, clear, or slightly bloody  This discharge may also be called bloody show  Bloody show is a mucus plug that forms and blocks your cervix during pregnancy  · Rupture of membranes  is a sudden release of clear fluid from your vagina  It is also known as when your water breaks  Your PHP or obstetrician may need to break your water if it does not break on its own  False labor: You may have false labor signs, which are also called Yossi Collins contractions  False labor is common and may happen several weeks or days before your actual labor  The contractions are not regular, and do not get closer together  The pain is usually mild, does not worsen, and is felt only in front  Yossi Collins contractions may happen later in the day, and stop after you change position, walk, or rest   Contact your PHP or obstetrician if:   · You have pain in your lower back or abdomen  · You have bloody mucus or show  · You have questions or concerns about your condition or care  Seek care immediately or call 911 if:   · You have regular, painful contractions that are less than 5 minutes apart, and last 30 to 70 seconds each  · You have heavy vaginal bleeding  · You have a constant trickle or sudden gush of clear fluid from your vagina  · You notice a sudden decrease in your baby's movement  © 2014 [address] is for End User's use only and may not be sold, redistributed or otherwise used for commercial purposes  All illustrations and images included in CareNotes® are the copyrighted property of [employer]  or Patrice Danielle  The above information is an  only  It is not intended as medical advice for individual conditions or treatments  Talk to your doctor, nurse or pharmacist before following any medical regimen to see if it is safe and effective for you

## 2021-02-16 NOTE — PROGRESS NOTES
L&D Triage Note - OB/GYN  Lendon Osler 21 y o  female MRN: [id number]  Unit/Bed#: L&D 324-01 Encounter: [id number]      Obi Rao is a patient of [facility]    ASSESSMENT:    Lendon Osler is a 21 y o   at 37w5d with leaking fluid, membranes intact  PLAN:    1) Leaking fluid  - R/o rupture workup negative (see below)  - NST reactive, no contractions on toco  - SVE closed/thick/high  - Discussed with patient that leaking is likely secondary to increased physiologic discharge of pregnancy  2) Continue routine prenatal care  3) Discharge from [facility] triage with term labor precautions    - Reviewed rupture of membranes, false vs true labor, decreased fetal movement, and vaginal bleeding   - Pt to call provider with any concerns and follow up at her next scheduled prenatal appointment on Thursday   - Case discussed with Dr Kleber Mcnulty:    Lendon Osler 21 y o  Samantha Acuña at 37w5d with an Estimated Date of Delivery: 3/4/21 presents with complaint of leaking fluid  Patient states she felt a large gush at 1230 and a continuous trickle since  Denies recent intercourse  Denies vaginal bleeding, contractions, decreased fetal movement  She is GBS negative  Her current obstetrical history is significant for anemia, GERD      OBJECTIVE:    Vitals:    21 1503   BP: 118/69   Pulse: (!) 108   Resp: 18   Temp: 97 7 °F (36 5 °C)       ROS:  Constitutional: Negative  Respiratory: Negative  Cardiovascular: Negative    Gastrointestinal: Negative    General Physical Exam:  General: in no apparent distress and well developed and well nourished  Cardiovascular: Cor RRR  Lungs: non-labored breathing  Abdomen: abdomen is soft without significant tenderness, masses, organomegaly or guarding  Lower extremeties: nontender    Cervical Exam  Speculum: Cervical os is closed  Scant white physiologic discharge present in posterior vaginal vault  No pooling or bleeding noted     SVE: 0 / 30% Rounds held. Nursing reports she feels better than compared to yest.  Jan Saavedra she was on 8L today she is on 5L= 96&. Afebrile, SB. Cardiology ordered echo to be done, spoke with George Merlos in bio-med pt is on schedule for echo today.  Electronically signed by Shane Pan RN on 2/4/2021 at 11:02 AM / -3    Fetal monitoring:  FHT:  140 bpm/ Moderate 6 - 25 bpm / 15 x 15 accelerations present, no decelerations  [place]: contractions absent     KOH/WTMT:     Infection:   - no clue cells    - no hyphae   - no trichomonads present    Membrane status   - no ferning   - negative MD JUAN M Lindquist PGY-1  2/16/2021 3:45 PM

## 2021-02-19 ENCOUNTER — ROUTINE PRENATAL (OUTPATIENT)
Dept: OBGYN CLINIC | Facility: CLINIC | Age: 21
End: 2021-02-19

## 2021-02-19 VITALS — WEIGHT: 204.6 LBS | BODY MASS INDEX: 34.05 KG/M2 | DIASTOLIC BLOOD PRESSURE: 80 MMHG | SYSTOLIC BLOOD PRESSURE: 112 MMHG

## 2021-02-19 DIAGNOSIS — O99.019 ANEMIA AFFECTING PREGNANCY, ANTEPARTUM: ICD-10-CM

## 2021-02-19 DIAGNOSIS — Z3A.38 38 WEEKS GESTATION OF PREGNANCY: Primary | ICD-10-CM

## 2021-02-19 LAB
SL AMB  POCT GLUCOSE, UA: NORMAL
SL AMB POCT URINE PROTEIN: NORMAL

## 2021-02-19 PROCEDURE — PNV: Performed by: OBSTETRICS & GYNECOLOGY

## 2021-02-19 NOTE — PROGRESS NOTES
Patient seen today for prenatal visit  1  38 1 weeks-good fetal movement noted  Fetal movement and labor precautions were reviewed  Patient aware of negative GBS  Counseled about recommended delivery between 39-41 weeks  We will discuss at next visit  2  Chest discomfort-denies any significant issues at this time  Did see primary care doctor, normal EKG noted  Likely related to long history of GERD, musculoskeletal, and pregnancy issues as per Dr Yoli Bolaños  3  History of depression-continue Zoloft  4  Anemia-hemoglobin 9 4 most recently  Continues with iron twice daily  5   Long history of GERD-continues on medications as per GI specialist

## 2021-02-19 NOTE — PATIENT INSTRUCTIONS
Pregnancy at 44 to 40 Weeks   [address] [place], [place] [address] :   What changes are happening in my body? You are now getting close to your due date  Your due date is just an estimate of when your baby will be born  Your baby may be born before or after your due date  Your breathing may be easier if your baby has moved down into a head-down position  You may need to urinate more often because the baby may be pressing on your bladder  You may also feel more discomfort and tire easily  You may also be having trouble sleeping  How do I care for myself at this stage of my pregnancy? · Eat a variety of healthy foods  Healthy foods include fruits, vegetables, whole-grain breads, low-fat dairy foods, beans, lean meats, and fish  Drink liquids as directed  Ask how much liquid to drink each day and which liquids are best for you  Limit caffeine to less than 200 milligrams each day  Limit your intake of fish to 2 servings each week  Choose fish low in mercury such as canned light tuna, shrimp, salmon, cod, or tilapia  Do not  eat fish high in mercury such as swordfish, tilefish, cee mackerel, and shark  · Take prenatal vitamins as directed  Your need for certain vitamins and minerals, such as folic acid, increases during pregnancy  Prenatal vitamins provide some of the extra vitamins and minerals you need  Prenatal vitamins may also help to decrease the risk of certain birth defects  · Rest as needed  Put your feet up if you have swelling in your ankles and feet  · Do not smoke  Smoking increases your risk of a miscarriage and other health problems during your pregnancy  Smoking can cause your baby to be born early or weigh less at birth  Ask your healthcare provider for information if you need help quitting  · Do not drink alcohol  Alcohol passes from your body to your baby through the placenta  It can affect your baby's brain development and cause fetal alcohol syndrome (FAS)   FAS is a group of conditions that causes mental, behavior, and growth problems  · Talk to your healthcare provider before you take any medicines  Many medicines may harm your baby if you take them when you are pregnant  Do not take any medicines, vitamins, herbs, or supplements without first talking to your healthcare provider  Never use illegal or street drugs (such as marijuana or cocaine) while you are pregnant  · Talk to your healthcare provider before you travel  You may not be able to travel in an airplane after 36 weeks  He may also recommend that you avoid long road trips  What are some safety tips during pregnancy? · Avoid hot tubs and saunas  Do not use a hot tub or sauna while you are pregnant, especially during your first trimester  Hot tubs and saunas may raise your baby's temperature and increase the risk of birth defects  · Avoid toxoplasmosis  This is an infection caused by eating raw meat or being around infected cat feces  It can cause birth defects, miscarriages, and other problems  Wash your hands after you touch raw meat  Make sure any meat is well-cooked before you eat it  Avoid raw eggs and unpasteurized milk  Use gloves or ask someone else to clean your cat's litter box while you are pregnant  · Ask your healthcare provider about travel  The most comfortable time to travel is during the second trimester  Ask your healthcare provider if you can travel after 36 weeks  You may not be able to travel in an airplane after 36 weeks  He may also recommend that you avoid long road trips  What changes are happening with my baby? Your baby is ready to be born  At birth, your baby may weigh about 6 to 9 pounds and be about 19 to 21 inches long  Your baby may be in a head-down position  Your baby will also rest lower in your abdomen  What do I need to know about prenatal care? Your healthcare provider will check your blood pressure and weight   You may also need the following:  · A urine test may also be done to check for sugar and protein  These can be signs of gestational diabetes or infection  Protein in your urine may also be a sign of preeclampsia  Preeclampsia is a condition that can develop during week 20 or later of your pregnancy  It causes high blood pressure, and it can cause problems with your kidneys and other organs  · Your baby's heart rate  will be checked  When should I seek immediate care? · You develop a severe headache that does not go away  · You have new or increased vision changes, such as blurred or spotted vision  · You have new or increased swelling in your face or hands  · You have vaginal spotting or bleeding  · Your water broke or you feel warm water gushing or trickling from your vagina  When should I contact my healthcare provider? · You have more than 5 contractions in 1 hour  · You notice any changes in your baby's movements  · You have abdominal cramps, pressure, or tightening  · You have a change in vaginal discharge  · You have chills or a fever  · You have vaginal itching, burning, or pain  · You have yellow, green, white, or foul-smelling vaginal discharge  · You have pain or burning when you urinate, less urine than usual, or pink or bloody urine  · You have questions or concerns about your condition or care  CARE AGREEMENT:   You have the right to help plan your care  Learn about your health condition and how it may be treated  Discuss treatment options with your healthcare providers to decide what care you want to receive  You always have the right to refuse treatment  The above information is an  only  It is not intended as medical advice for individual conditions or treatments  Talk to your doctor, nurse or pharmacist before following any medical regimen to see if it is safe and effective for you    © Copyright [address] Information is for End User's use only and may not be sold, redistributed or otherwise used for commercial purposes   All illustrations and images included in CareNotes® are the copyrighted property of A D A M , Inc  or [facility] [employer]

## 2021-02-25 ENCOUNTER — ROUTINE PRENATAL (OUTPATIENT)
Dept: OBGYN CLINIC | Facility: CLINIC | Age: 21
End: 2021-02-25
Payer: COMMERCIAL

## 2021-02-25 ENCOUNTER — ULTRASOUND (OUTPATIENT)
Dept: OBGYN CLINIC | Facility: CLINIC | Age: 21
End: 2021-02-25
Payer: COMMERCIAL

## 2021-02-25 VITALS — BODY MASS INDEX: 34.11 KG/M2 | SYSTOLIC BLOOD PRESSURE: 122 MMHG | DIASTOLIC BLOOD PRESSURE: 82 MMHG | WEIGHT: 205 LBS

## 2021-02-25 DIAGNOSIS — Z3A.39 39 WEEKS GESTATION OF PREGNANCY: Primary | ICD-10-CM

## 2021-02-25 DIAGNOSIS — O36.8120 DECREASED FETAL MOVEMENTS IN SECOND TRIMESTER, SINGLE OR UNSPECIFIED FETUS: Primary | ICD-10-CM

## 2021-02-25 LAB
SL AMB  POCT GLUCOSE, UA: NORMAL
SL AMB POCT URINE PROTEIN: NORMAL

## 2021-02-25 PROCEDURE — 76815 OB US LIMITED FETUS(S): CPT | Performed by: OBSTETRICS & GYNECOLOGY

## 2021-02-25 PROCEDURE — PNV: Performed by: OBSTETRICS & GYNECOLOGY

## 2021-02-25 PROCEDURE — 59025 FETAL NON-STRESS TEST: CPT | Performed by: OBSTETRICS & GYNECOLOGY

## 2021-02-25 NOTE — PROGRESS NOTES
Patient seen today for prenatal visit  1  39 0 weeks-good fetal movement noted  Fetal movement and labor precautions were reviewed  Patient aware of negative GBS  Interested induction, scheduled 2/28/21 at 2000 hours into 3/1/21, Dr Dank Heller present on that date  She was counseled about Cytotec, Schmid balloon, Pitocin, and amniotomy  2  Decreased fetal movement-has noted some decrease in movement, but overall not concerning   reactive category 1 without decelerations or contractions  Excellent fetal movement was noted during the NST  CONNER was 11 6, vertex presentation with fetal heart tone 126  Fetal movement recommendations were reviewed in detail with the patient and her mother  Strongly urged to call if any concerns about movement, particularly less than 10 movements in 1-2 hour time frame  3  History of depression-continue Zoloft  4  Anemia-continues iron twice daily separate from prenatal vitamin  Most recent hemoglobin 9 4   5  Long history of GERD/chest discomfort-improved at this time

## 2021-02-25 NOTE — Clinical Note
Patient is scheduled for induction 2/28/21 at 2000 hours into 3/1/21 when Dr Elizabeth Hutchinson is on-call  Patient did have decreased fetal movement today with excellent reassuring testing  Strongly urged to call if recurrence of decreased fetal movement prior to induction time

## 2021-02-25 NOTE — PATIENT INSTRUCTIONS
Pregnancy at 44 to 40 Weeks   [address] [place], [place] [address] :   What changes are happening in my body? You are now getting close to your due date  Your due date is just an estimate of when your baby will be born  Your baby may be born before or after your due date  Your breathing may be easier if your baby has moved down into a head-down position  You may need to urinate more often because the baby may be pressing on your bladder  You may also feel more discomfort and tire easily  You may also be having trouble sleeping  How do I care for myself at this stage of my pregnancy? · Eat a variety of healthy foods  Healthy foods include fruits, vegetables, whole-grain breads, low-fat dairy foods, beans, lean meats, and fish  Drink liquids as directed  Ask how much liquid to drink each day and which liquids are best for you  Limit caffeine to less than 200 milligrams each day  Limit your intake of fish to 2 servings each week  Choose fish low in mercury such as canned light tuna, shrimp, salmon, cod, or tilapia  Do not  eat fish high in mercury such as swordfish, tilefish, cee mackerel, and shark  · Take prenatal vitamins as directed  Your need for certain vitamins and minerals, such as folic acid, increases during pregnancy  Prenatal vitamins provide some of the extra vitamins and minerals you need  Prenatal vitamins may also help to decrease the risk of certain birth defects  · Rest as needed  Put your feet up if you have swelling in your ankles and feet  · Do not smoke  Smoking increases your risk of a miscarriage and other health problems during your pregnancy  Smoking can cause your baby to be born early or weigh less at birth  Ask your healthcare provider for information if you need help quitting  · Do not drink alcohol  Alcohol passes from your body to your baby through the placenta  It can affect your baby's brain development and cause fetal alcohol syndrome (FAS)   FAS is a group of conditions that causes mental, behavior, and growth problems  · Talk to your healthcare provider before you take any medicines  Many medicines may harm your baby if you take them when you are pregnant  Do not take any medicines, vitamins, herbs, or supplements without first talking to your healthcare provider  Never use illegal or street drugs (such as marijuana or cocaine) while you are pregnant  · Talk to your healthcare provider before you travel  You may not be able to travel in an airplane after 36 weeks  He may also recommend that you avoid long road trips  What are some safety tips during pregnancy? · Avoid hot tubs and saunas  Do not use a hot tub or sauna while you are pregnant, especially during your first trimester  Hot tubs and saunas may raise your baby's temperature and increase the risk of birth defects  · Avoid toxoplasmosis  This is an infection caused by eating raw meat or being around infected cat feces  It can cause birth defects, miscarriages, and other problems  Wash your hands after you touch raw meat  Make sure any meat is well-cooked before you eat it  Avoid raw eggs and unpasteurized milk  Use gloves or ask someone else to clean your cat's litter box while you are pregnant  · Ask your healthcare provider about travel  The most comfortable time to travel is during the second trimester  Ask your healthcare provider if you can travel after 36 weeks  You may not be able to travel in an airplane after 36 weeks  He may also recommend that you avoid long road trips  What changes are happening with my baby? Your baby is ready to be born  At birth, your baby may weigh about 6 to 9 pounds and be about 19 to 21 inches long  Your baby may be in a head-down position  Your baby will also rest lower in your abdomen  What do I need to know about prenatal care? Your healthcare provider will check your blood pressure and weight   You may also need the following:  · A urine test may also be done to check for sugar and protein  These can be signs of gestational diabetes or infection  Protein in your urine may also be a sign of preeclampsia  Preeclampsia is a condition that can develop during week 20 or later of your pregnancy  It causes high blood pressure, and it can cause problems with your kidneys and other organs  · Your baby's heart rate  will be checked  When should I seek immediate care? · You develop a severe headache that does not go away  · You have new or increased vision changes, such as blurred or spotted vision  · You have new or increased swelling in your face or hands  · You have vaginal spotting or bleeding  · Your water broke or you feel warm water gushing or trickling from your vagina  When should I contact my healthcare provider? · You have more than 5 contractions in 1 hour  · You notice any changes in your baby's movements  · You have abdominal cramps, pressure, or tightening  · You have a change in vaginal discharge  · You have chills or a fever  · You have vaginal itching, burning, or pain  · You have yellow, green, white, or foul-smelling vaginal discharge  · You have pain or burning when you urinate, less urine than usual, or pink or bloody urine  · You have questions or concerns about your condition or care  CARE AGREEMENT:   You have the right to help plan your care  Learn about your health condition and how it may be treated  Discuss treatment options with your healthcare providers to decide what care you want to receive  You always have the right to refuse treatment  The above information is an  only  It is not intended as medical advice for individual conditions or treatments  Talk to your doctor, nurse or pharmacist before following any medical regimen to see if it is safe and effective for you    © Copyright [address] Information is for End User's use only and may not be sold, redistributed or otherwise used for commercial purposes   All illustrations and images included in CareNotes® are the copyrighted property of [employer]  or [employer]

## 2021-02-28 ENCOUNTER — HOSPITAL ENCOUNTER (OUTPATIENT)
Dept: LABOR AND DELIVERY | Facility: HOSPITAL | Age: 21
Discharge: HOME | End: 2021-02-28
Payer: COMMERCIAL

## 2021-02-28 ENCOUNTER — HOSPITAL ENCOUNTER (INPATIENT)
Facility: HOSPITAL | Age: 21
LOS: 3 days | Discharge: HOME | End: 2021-03-03
Attending: OBSTETRICS & GYNECOLOGY | Admitting: OBSTETRICS & GYNECOLOGY
Payer: COMMERCIAL

## 2021-02-28 DIAGNOSIS — Z3A.39 39 WEEKS GESTATION OF PREGNANCY: Primary | ICD-10-CM

## 2021-02-28 LAB
ABO GROUP BLD: NORMAL
AMPHETAMINES SERPL QL SCN: NEGATIVE
BARBITURATES UR QL: NEGATIVE
BENZODIAZ UR QL: NEGATIVE
BLD GP AB SCN SERPL QL: NEGATIVE
COCAINE UR QL: NEGATIVE
ERYTHROCYTE [DISTWIDTH] IN BLOOD BY AUTOMATED COUNT: 14.1 % (ref 11.6–15.1)
HCT VFR BLD AUTO: 28.7 % (ref 34.8–46.1)
HGB BLD-MCNC: 8.9 G/DL (ref 11.5–15.4)
MCH RBC QN AUTO: 25 PG (ref 26.8–34.3)
MCHC RBC AUTO-ENTMCNC: 31 G/DL (ref 31.4–37.4)
MCV RBC AUTO: 81 FL (ref 82–98)
METHADONE UR QL: NEGATIVE
OPIATES UR QL SCN: NEGATIVE
OXYCODONE+OXYMORPHONE UR QL SCN: NEGATIVE
PCP UR QL: NEGATIVE
PLATELET # BLD AUTO: 252 THOUSANDS/UL (ref 149–390)
PMV BLD AUTO: 10.9 FL (ref 8.9–12.7)
RBC # BLD AUTO: 3.56 MILLION/UL (ref 3.81–5.12)
RH BLD: POSITIVE
SPECIMEN EXPIRATION DATE: NORMAL
THC UR QL: NEGATIVE
WBC # BLD AUTO: 10.73 THOUSAND/UL (ref 4.31–10.16)

## 2021-02-28 PROCEDURE — NC001 PR NO CHARGE: Performed by: OBSTETRICS & GYNECOLOGY

## 2021-02-28 PROCEDURE — 85027 COMPLETE CBC AUTOMATED: CPT | Performed by: OBSTETRICS & GYNECOLOGY

## 2021-02-28 PROCEDURE — 3E0D7GC INTRODUCTION OF OTHER THERAPEUTIC SUBSTANCE INTO MOUTH AND PHARYNX, VIA NATURAL OR ARTIFICIAL OPENING: ICD-10-PCS | Performed by: OBSTETRICS & GYNECOLOGY

## 2021-02-28 PROCEDURE — 4A1HXCZ MONITORING OF PRODUCTS OF CONCEPTION, CARDIAC RATE, EXTERNAL APPROACH: ICD-10-PCS | Performed by: OBSTETRICS & GYNECOLOGY

## 2021-02-28 PROCEDURE — 86592 SYPHILIS TEST NON-TREP QUAL: CPT | Performed by: OBSTETRICS & GYNECOLOGY

## 2021-02-28 PROCEDURE — 80307 DRUG TEST PRSMV CHEM ANLYZR: CPT | Performed by: OBSTETRICS & GYNECOLOGY

## 2021-02-28 PROCEDURE — 86901 BLOOD TYPING SEROLOGIC RH(D): CPT | Performed by: OBSTETRICS & GYNECOLOGY

## 2021-02-28 PROCEDURE — 86850 RBC ANTIBODY SCREEN: CPT | Performed by: OBSTETRICS & GYNECOLOGY

## 2021-02-28 PROCEDURE — 86920 COMPATIBILITY TEST SPIN: CPT

## 2021-02-28 PROCEDURE — 86900 BLOOD TYPING SEROLOGIC ABO: CPT | Performed by: OBSTETRICS & GYNECOLOGY

## 2021-02-28 RX ORDER — ALBUTEROL SULFATE 90 UG/1
2 AEROSOL, METERED RESPIRATORY (INHALATION) EVERY 4 HOURS PRN
Status: DISCONTINUED | OUTPATIENT
Start: 2021-02-28 | End: 2021-03-03 | Stop reason: HOSPADM

## 2021-02-28 RX ORDER — ONDANSETRON 2 MG/ML
4 INJECTION INTRAMUSCULAR; INTRAVENOUS EVERY 6 HOURS PRN
Status: DISCONTINUED | OUTPATIENT
Start: 2021-02-28 | End: 2021-03-01

## 2021-02-28 RX ORDER — SUCRALFATE 1 G/1
1 TABLET ORAL 2 TIMES DAILY
Status: DISCONTINUED | OUTPATIENT
Start: 2021-02-28 | End: 2021-03-03 | Stop reason: HOSPADM

## 2021-02-28 RX ORDER — SODIUM CHLORIDE, SODIUM LACTATE, POTASSIUM CHLORIDE, CALCIUM CHLORIDE 600; 310; 30; 20 MG/100ML; MG/100ML; MG/100ML; MG/100ML
125 INJECTION, SOLUTION INTRAVENOUS CONTINUOUS
Status: DISCONTINUED | OUTPATIENT
Start: 2021-02-28 | End: 2021-03-01

## 2021-02-28 RX ORDER — PANTOPRAZOLE SODIUM 40 MG/1
40 TABLET, DELAYED RELEASE ORAL
Status: DISCONTINUED | OUTPATIENT
Start: 2021-03-01 | End: 2021-03-03 | Stop reason: HOSPADM

## 2021-02-28 RX ADMIN — MISOPROSTOL 25 MCG: 100 TABLET ORAL at 20:57

## 2021-02-28 RX ADMIN — IRON SUCROSE 300 MG: 20 INJECTION, SOLUTION INTRAVENOUS at 22:30

## 2021-03-01 ENCOUNTER — ANESTHESIA EVENT (INPATIENT)
Dept: ANESTHESIOLOGY | Facility: HOSPITAL | Age: 21
End: 2021-03-01
Payer: COMMERCIAL

## 2021-03-01 ENCOUNTER — ANESTHESIA (INPATIENT)
Dept: ANESTHESIOLOGY | Facility: HOSPITAL | Age: 21
End: 2021-03-01
Payer: COMMERCIAL

## 2021-03-01 LAB
ABO GROUP BLD: NORMAL
APTT PPP: 30 SECONDS (ref 23–37)
BASE EXCESS BLDCOA CALC-SCNC: -5.5 MMOL/L (ref 3–11)
BASE EXCESS BLDCOV CALC-SCNC: -3.9 MMOL/L (ref 1–9)
ERYTHROCYTE [DISTWIDTH] IN BLOOD BY AUTOMATED COUNT: 14.3 % (ref 11.6–15.1)
ERYTHROCYTE [DISTWIDTH] IN BLOOD BY AUTOMATED COUNT: 14.3 % (ref 11.6–15.1)
FIBRINOGEN PPP-MCNC: 527 MG/DL (ref 227–495)
HCO3 BLDCOA-SCNC: 21.9 MMOL/L (ref 17.3–27.3)
HCO3 BLDCOV-SCNC: 21.8 MMOL/L (ref 12.2–28.6)
HCT VFR BLD AUTO: 24.4 % (ref 34.8–46.1)
HCT VFR BLD AUTO: 27.7 % (ref 34.8–46.1)
HGB BLD-MCNC: 7.3 G/DL (ref 11.5–15.4)
HGB BLD-MCNC: 8.4 G/DL (ref 11.5–15.4)
INR PPP: 1.21 (ref 0.84–1.19)
MCH RBC QN AUTO: 24.6 PG (ref 26.8–34.3)
MCH RBC QN AUTO: 24.9 PG (ref 26.8–34.3)
MCHC RBC AUTO-ENTMCNC: 29.9 G/DL (ref 31.4–37.4)
MCHC RBC AUTO-ENTMCNC: 30.3 G/DL (ref 31.4–37.4)
MCV RBC AUTO: 82 FL (ref 82–98)
MCV RBC AUTO: 82 FL (ref 82–98)
O2 CT VFR BLDCOA CALC: 8.6 ML/DL
OXYHGB MFR BLDCOA: 41.3 %
OXYHGB MFR BLDCOV: 48.3 %
PCO2 BLDCOA: 49.7 MM[HG] (ref 30–60)
PCO2 BLDCOV: 42.1 MM HG (ref 27–43)
PH BLDCOA: 7.26 [PH] (ref 7.23–7.43)
PH BLDCOV: 7.33 [PH] (ref 7.19–7.49)
PLATELET # BLD AUTO: 189 THOUSANDS/UL (ref 149–390)
PLATELET # BLD AUTO: 221 THOUSANDS/UL (ref 149–390)
PMV BLD AUTO: 10.4 FL (ref 8.9–12.7)
PMV BLD AUTO: 10.6 FL (ref 8.9–12.7)
PO2 BLDCOA: 20 MM HG (ref 5–25)
PO2 BLDCOV: 21.7 MM HG (ref 15–45)
PROTHROMBIN TIME: 15.1 SECONDS (ref 11.6–14.5)
RBC # BLD AUTO: 2.97 MILLION/UL (ref 3.81–5.12)
RBC # BLD AUTO: 3.38 MILLION/UL (ref 3.81–5.12)
RH BLD: POSITIVE
RPR SER QL: NORMAL
SAO2 % BLDCOV: 10.3 ML/DL
WBC # BLD AUTO: 12.35 THOUSAND/UL (ref 4.31–10.16)
WBC # BLD AUTO: 17.23 THOUSAND/UL (ref 4.31–10.16)

## 2021-03-01 PROCEDURE — 85610 PROTHROMBIN TIME: CPT | Performed by: OBSTETRICS & GYNECOLOGY

## 2021-03-01 PROCEDURE — 85027 COMPLETE CBC AUTOMATED: CPT | Performed by: OBSTETRICS & GYNECOLOGY

## 2021-03-01 PROCEDURE — 0KQM0ZZ REPAIR PERINEUM MUSCLE, OPEN APPROACH: ICD-10-PCS | Performed by: OBSTETRICS & GYNECOLOGY

## 2021-03-01 PROCEDURE — 30233N1 TRANSFUSION OF NONAUTOLOGOUS RED BLOOD CELLS INTO PERIPHERAL VEIN, PERCUTANEOUS APPROACH: ICD-10-PCS | Performed by: OBSTETRICS & GYNECOLOGY

## 2021-03-01 PROCEDURE — 82805 BLOOD GASES W/O2 SATURATION: CPT | Performed by: OBSTETRICS & GYNECOLOGY

## 2021-03-01 PROCEDURE — 85384 FIBRINOGEN ACTIVITY: CPT | Performed by: OBSTETRICS & GYNECOLOGY

## 2021-03-01 PROCEDURE — 59400 OBSTETRICAL CARE: CPT | Performed by: OBSTETRICS & GYNECOLOGY

## 2021-03-01 PROCEDURE — 85730 THROMBOPLASTIN TIME PARTIAL: CPT | Performed by: OBSTETRICS & GYNECOLOGY

## 2021-03-01 RX ORDER — DOCUSATE SODIUM 100 MG/1
100 CAPSULE, LIQUID FILLED ORAL 2 TIMES DAILY
Status: DISCONTINUED | OUTPATIENT
Start: 2021-03-01 | End: 2021-03-03 | Stop reason: HOSPADM

## 2021-03-01 RX ORDER — OXYTOCIN/RINGER'S LACTATE 30/500 ML
1-30 PLASTIC BAG, INJECTION (ML) INTRAVENOUS
Status: DISCONTINUED | OUTPATIENT
Start: 2021-03-01 | End: 2021-03-01

## 2021-03-01 RX ORDER — BUTORPHANOL TARTRATE 1 MG/ML
1 INJECTION, SOLUTION INTRAMUSCULAR; INTRAVENOUS ONCE
Status: COMPLETED | OUTPATIENT
Start: 2021-03-01 | End: 2021-03-01

## 2021-03-01 RX ORDER — ONDANSETRON 2 MG/ML
4 INJECTION INTRAMUSCULAR; INTRAVENOUS EVERY 4 HOURS PRN
Status: DISCONTINUED | OUTPATIENT
Start: 2021-03-01 | End: 2021-03-01

## 2021-03-01 RX ORDER — ROPIVACAINE HYDROCHLORIDE 2 MG/ML
INJECTION, SOLUTION EPIDURAL; INFILTRATION; PERINEURAL
Status: COMPLETED
Start: 2021-03-01 | End: 2021-03-01

## 2021-03-01 RX ORDER — DIAPER,BRIEF,INFANT-TODD,DISP
1 EACH MISCELLANEOUS 4 TIMES DAILY PRN
Status: DISCONTINUED | OUTPATIENT
Start: 2021-03-01 | End: 2021-03-03 | Stop reason: HOSPADM

## 2021-03-01 RX ORDER — PROMETHAZINE HYDROCHLORIDE 25 MG/ML
12.5 INJECTION, SOLUTION INTRAMUSCULAR; INTRAVENOUS ONCE
Status: COMPLETED | OUTPATIENT
Start: 2021-03-01 | End: 2021-03-01

## 2021-03-01 RX ORDER — ACETAMINOPHEN 325 MG/1
650 TABLET ORAL EVERY 6 HOURS SCHEDULED
Status: DISCONTINUED | OUTPATIENT
Start: 2021-03-01 | End: 2021-03-03 | Stop reason: HOSPADM

## 2021-03-01 RX ORDER — CALCIUM CARBONATE 200(500)MG
1000 TABLET,CHEWABLE ORAL DAILY PRN
Status: DISCONTINUED | OUTPATIENT
Start: 2021-03-01 | End: 2021-03-03 | Stop reason: HOSPADM

## 2021-03-01 RX ORDER — METOCLOPRAMIDE HYDROCHLORIDE 5 MG/ML
10 INJECTION INTRAMUSCULAR; INTRAVENOUS EVERY 6 HOURS PRN
Status: DISCONTINUED | OUTPATIENT
Start: 2021-03-01 | End: 2021-03-01

## 2021-03-01 RX ORDER — ROPIVACAINE HYDROCHLORIDE 2 MG/ML
INJECTION, SOLUTION EPIDURAL; INFILTRATION; PERINEURAL
Status: COMPLETED | OUTPATIENT
Start: 2021-03-01 | End: 2021-03-01

## 2021-03-01 RX ORDER — IBUPROFEN 600 MG/1
600 TABLET ORAL EVERY 6 HOURS SCHEDULED
Status: DISCONTINUED | OUTPATIENT
Start: 2021-03-01 | End: 2021-03-03 | Stop reason: HOSPADM

## 2021-03-01 RX ORDER — SIMETHICONE 80 MG
80 TABLET,CHEWABLE ORAL 4 TIMES DAILY PRN
Status: DISCONTINUED | OUTPATIENT
Start: 2021-03-01 | End: 2021-03-03 | Stop reason: HOSPADM

## 2021-03-01 RX ADMIN — BENZOCAINE AND LEVOMENTHOL 1 APPLICATION: 200; 5 SPRAY TOPICAL at 18:45

## 2021-03-01 RX ADMIN — BUTORPHANOL TARTRATE 1 MG: 1 INJECTION, SOLUTION INTRAMUSCULAR; INTRAVENOUS at 02:43

## 2021-03-01 RX ADMIN — SODIUM CHLORIDE, SODIUM LACTATE, POTASSIUM CHLORIDE, AND CALCIUM CHLORIDE 125 ML/HR: .6; .31; .03; .02 INJECTION, SOLUTION INTRAVENOUS at 07:54

## 2021-03-01 RX ADMIN — PROMETHAZINE HYDROCHLORIDE 12.5 MG: 25 INJECTION INTRAMUSCULAR; INTRAVENOUS at 02:43

## 2021-03-01 RX ADMIN — WITCH HAZEL 1 PAD: 500 SOLUTION RECTAL; TOPICAL at 18:45

## 2021-03-01 RX ADMIN — METOCLOPRAMIDE 10 MG: 5 INJECTION, SOLUTION INTRAMUSCULAR; INTRAVENOUS at 14:31

## 2021-03-01 RX ADMIN — SODIUM CHLORIDE, SODIUM LACTATE, POTASSIUM CHLORIDE, AND CALCIUM CHLORIDE 125 ML/HR: .6; .31; .03; .02 INJECTION, SOLUTION INTRAVENOUS at 06:44

## 2021-03-01 RX ADMIN — SERTRALINE HYDROCHLORIDE 50 MG: 50 TABLET ORAL at 09:06

## 2021-03-01 RX ADMIN — ONDANSETRON 4 MG: 2 INJECTION INTRAMUSCULAR; INTRAVENOUS at 06:30

## 2021-03-01 RX ADMIN — MISOPROSTOL 25 MCG: 100 TABLET ORAL at 01:09

## 2021-03-01 RX ADMIN — SUCRALFATE 1 G: 1 TABLET ORAL at 09:06

## 2021-03-01 RX ADMIN — IBUPROFEN 600 MG: 600 TABLET ORAL at 19:25

## 2021-03-01 RX ADMIN — Medication 2 MILLI-UNITS/MIN: at 09:53

## 2021-03-01 RX ADMIN — BUTORPHANOL TARTRATE 1 MG: 1 INJECTION, SOLUTION INTRAMUSCULAR; INTRAVENOUS at 05:24

## 2021-03-01 RX ADMIN — ACETAMINOPHEN 650 MG: 325 TABLET, COATED ORAL at 21:47

## 2021-03-01 RX ADMIN — ROPIVACAINE HYDROCHLORIDE: 2 INJECTION, SOLUTION EPIDURAL; INFILTRATION at 07:43

## 2021-03-01 RX ADMIN — PANTOPRAZOLE SODIUM 40 MG: 40 TABLET, DELAYED RELEASE ORAL at 07:43

## 2021-03-01 RX ADMIN — ONDANSETRON 4 MG: 2 INJECTION INTRAMUSCULAR; INTRAVENOUS at 10:34

## 2021-03-01 RX ADMIN — ROPIVACAINE HYDROCHLORIDE 10 ML: 2 INJECTION, SOLUTION EPIDURAL; INFILTRATION at 07:16

## 2021-03-01 RX ADMIN — SODIUM CHLORIDE, SODIUM LACTATE, POTASSIUM CHLORIDE, AND CALCIUM CHLORIDE 125 ML/HR: .6; .31; .03; .02 INJECTION, SOLUTION INTRAVENOUS at 02:43

## 2021-03-01 NOTE — DISCHARGE INSTRUCTIONS
Vaginal Delivery   WHAT YOU SHOULD KNOW:   A vaginal delivery is the birth of your baby through your vagina (birth canal)  AFTER YOU LEAVE:   Medicines:  · NSAIDs  help decrease swelling and pain or fever  This medicine is available with or without a doctor's order  NSAIDs can cause stomach bleeding or kidney problems in certain people  If you take blood thinner medicine, always ask your healthcare provider if NSAIDs are safe for you  Always read the medicine label and follow directions  · Take your medicine as directed  Call your healthcare provider if you think your medicine is not helping or if you have side effects  Tell him if you are allergic to any medicine  Keep a list of the medicines, vitamins, and herbs you take  Include the amounts, and when and why you take them  Bring the list or the pill bottles to follow-up visits  Carry your medicine list with you in case of an emergency  Follow up with your primary healthcare provider:  Most women need to return 6 weeks after a vaginal delivery  Ask about how to care for your wounds or stitches  Write down your questions so you remember to ask them during your visits  Activity:  Rest as much as possible  Try to keep all activities short  You may be able to do some exercise soon after you have your baby  Talk with your primary healthcare provider before you start exercising  If you work outside the home, ask when you can return to your job  Kegel exercises:  Kegel exercises may help your vaginal and rectal muscles heal faster  You can do Kegel exercises by tightening and relaxing the muscles around your vagina  Kegel exercises help make the muscles stronger  Breast care:  When your milk comes in, your breasts may feel full and hard  Ask how to care for your breasts, even if you are not breastfeeding  Constipation:  Do not try to push the bowel movement out if it is too hard   High-fiber foods, extra liquids, and regular exercise can help you prevent constipation  Examples of high-fiber foods are fruit and bran  Prune juice and water are good liquids to drink  Regular exercise helps your digestive system work  You may also be told to take over-the-counter fiber and stool softener medicines  Take these items as directed  Hemorrhoids:  Pregnancy can cause severe hemorrhoids  You may have rectal pain because of the hemorrhoids  Ask how to prevent or treat hemorrhoids  Perineum care: Your perineum is the area between your vagina and anus  Keep the area clean and dry to help it heal and to prevent infection  Wash the area gently with soap and water when you bathe or shower  Rinse your perineum with warm water when you use the toilet  Your primary healthcare provider may suggest you use a warm sitz bath to help decrease pain  A sitz bath is a bathtub or basin filled to hip level  Stay in the sitz bath for 20 to 30 minutes, or as directed  Vaginal discharge: You will have vaginal discharge, called lochia, after your delivery  The lochia is bright red the first day or two after the birth  By the fourth day, the amount decreases, and it turns red-brown  Use a sanitary pad rather than a tampon to prevent a vaginal infection  It is normal to have lochia up to 8 weeks after your baby is born  Monthly periods: Your period may start again within 7 to 12 weeks after your baby is born  If you are breastfeeding, it may take longer for your period to start again  You can still get pregnant again even though you do not have your monthly period  Talk with your primary healthcare provider about a birth control method that will be good for you if you do not want to get pregnant  Mood changes: Many new mothers have some kind of mood changes after delivery  Some of these changes occur because of lack of sleep, hormone changes, and caring for a new baby  Some mood changes can be more serious, such as postpartum depression   Talk with your primary healthcare provider if you feel unable to care for yourself or your baby  Sexual activity:  You may need to avoid sex for 6 to 7 weeks after you have your baby  You may notice you have a decreased desire for sex, or sex may be painful  You may need to use a vaginal lubricant (gel) to help make sex more comfortable  Contact your primary healthcare provider if:   · You have heavy vaginal bleeding that fills 1 or more sanitary pads in 1 hour  · You have a fever  · Your pain does not go away, or gets worse  · The skin between your vagina and rectum is swollen, warm, or red  · You have swollen, hard, or painful breasts  · You feel very sad or depressed  · You feel more tired than usual      · You have questions or concerns about your condition or care  Seek care immediately or call 911 if:   · You have pus or yellow drainage coming from your vagina or wound  · You are urinating very little, or not at all  · Your arm or leg feels warm, tender, and painful  It may look swollen and red  · You feel lightheaded, have sudden and worsening chest pain, or trouble breathing  You may have more pain when you take deep breaths or cough, or you may cough up blood  © 2014 [address] is for End User's use only and may not be sold, redistributed or otherwise used for commercial purposes  All illustrations and images included in CareNotes® are the copyrighted property of [employer]  or Patrice Danielle  The above information is an  only  It is not intended as medical advice for individual conditions or treatments  Talk to your doctor, nurse or pharmacist before following any medical regimen to see if it is safe and effective for you  Self Care After Delivery   AMBULATORY CARE:   The postpartum period  is the period of time from delivery to about 6 weeks  During this time you may experience many physical and emotional changes   It is important to understand what is normal and when you need to call your healthcare provider  It is also important to know how to care for yourself during this time  Call your local emergency number (911 in the [address]) for any of the following:   · You see or hear things that are not there, or have thoughts of harming yourself or your baby  · You soak through 1 pad in 15 minutes, have blurry vision, clammy or pale skin, and feel faint  · You faint or lose consciousness  · You have trouble breathing  · You cough up blood  · Your  incision comes apart  Seek care immediately if:   · Your heart is beating faster than usual     · You have a bad headache or changes in your vision  · Your episiotomy or  incision is red, swollen, bleeding, or draining pus  · You have severe abdominal pain  Call your doctor or obstetrician if:   · Your leg is painful, red, and larger than usual     · You soak through 1 or more pads in an hour, or pass blood clots larger than a quarter from your vagina  · You have a fever  · You have new or worsening pain in your abdomen or vagina  · You continue to have depression 1 to 2 weeks after you deliver  · You have trouble sleeping  · You have foul-smelling discharge from your vagina  · You have pain or burning when you urinate  · You do not have a bowel movement for 3 days or more  · You have nausea or are vomiting  · You have hard lumps or red streaks over your breasts  · You have cracked nipples or bleed from your nipples  · You have questions or concerns about your condition or care  Physical changes:   The following are normal changes after you give birth:  · Pain in the area between your anus and vagina    · Breast pain    · Constipation or hemorrhoids    · Hot or cold flashes    · Vaginal bleeding or discharge    · Mild to moderate abdominal cramping    · Difficulty controlling bowel movements or urine    Emotional changes:  A drop in hormone levels after you deliver may cause changes in your emotions  You may feel irritable, sad, or anxious  You may cry easily or for no reason  You may also feel depressed  Depression that continues can be a sign of postpartum depression, a condition that can be treated  Treatment may include talk therapy, medicines, or both  Healthcare providers will ask how you are feeling and if you have any depression  These talks can happen during appointments for your medical care and for your baby's care, such as well child visits  Providers can help you find ways to care for yourself and your baby  Talk to your providers about the following:  · When emotional changes or depression started, and if it is getting worse over time    · Problems you are having with daily activities, sleep, or caring for your baby    · If anything makes you feel worse, or makes you feel better    · Feeling that you are not bonding with your baby the way you want    · Any problems your baby has with sleeping or feeding    · Your baby is fussy or cries a lot    · Support you have from friends, family, or others    Breast care for breastfeeding mothers: You may have sore breasts for 3 to 6 days after you give birth  This happens as your milk begins to fill your breasts  You may also have sore breasts if you do not breastfeed frequently  Do the following to care for your breasts:  · Apply a moist, warm, compress to your breast as directed  This may help soothe your breasts  Make sure the washcloth is not too hot before you apply it to your breast     · Nurse your baby or pump your milk frequently  This may prevent clogged milk ducts  Ask your healthcare provider how often to nurse or pump  · Massage your breasts as directed  This may help increase your milk flow  Gently rub your breasts in a circular motion before you breastfeed  You may need to gently squeeze your breast or nipple to help release milk   You can also use a breast pump to help release milk from your breast     · Wash your breasts with warm water only  Do not put soap on your nipples  Soap may cause your nipples to become dry  · Apply lanolin cream to your nipples as directed  Lanolin cream may add moisture to your skin and prevent nipple dryness  Always  wash off lanolin cream with warm water before you breastfeed  · Place pads in your bra  Your nipples may leak milk when you are not breastfeeding  You can place pads inside of your bra to help prevent leaking onto your clothing  Ask your healthcare provider where to purchase bra pads  · Get breastfeeding support if needed  Healthcare providers can answer questions about breastfeeding and provide you with support  Ask your healthcare provider who you can contact if you need breastfeeding support  Breast care for non-breastfeeding mothers:  Milk will fill your breasts even if you bottle feed your baby  Do the following to help stop your milk from filling your breasts and causing pain:  · Wear a bra with support at all times  A sports bra or a tight-fitting bra will help stop your milk from coming in  · Apply ice on each breast for 15 to 20 minutes every hour or as directed  Use an ice pack, or put crushed ice in a plastic bag  Cover it with a towel before you apply it to your breast  Ice helps your milk ducts shrink  · Keep your breasts away from warm water  Warm water will make it easier for milk to fill your breasts  Stand with your breasts away from warm water in the shower  · Limit how much you touch your breasts  This will prevent them from filling with milk  Perineum care: Your perineum is the area between your rectum and vagina  It is normal to have swelling and pain in this area after you give birth  If you had an episiotomy, your healthcare provider may give you special instructions  · Clean your perineum after you use the bathroom  This may prevent infection and help with healing   Use a spray bottle with warm water to clean your perineum  You may also gently spray warm water against your perineum when you urinate  Always wipe front to back  · Take a sitz bath as directed  A sitz bath may help relieve swelling and pain  Fill your bath tub or bucket with water up to your hips and sit in the water  Use cold water for 2 days after you deliver  Then use warm water  Ask your healthcare provider for more information about a sitz bath  · Apply ice packs for the first 24 hours or as directed  Use a plastic glove filled with ice or buy an ice pack  Wrap the ice pack or plastic glove in a small towel or wash cloth  Place the ice pack on your perineum for 20 minutes at a time  · Sit on a donut-shaped pillow  This may relieve pressure on your perineum when you sit  · Use wipes that contain medicine or take pills as directed  Your healthcare provider may tell you to use witch hazel pads  You can place witch hazel pads in the refrigerator before you apply them to your perineum  Your provider may also tell you to take NSAIDs  Ask him or her how often to take pills or use the wipes  · Do not go swimming or take tub baths for 4 to 6 weeks or as directed  This will help prevent an infection in your vagina or uterus  Bowel and bladder care: It may take 3 to 5 days to have a bowel movement after you deliver your baby  You can do the following to prevent or manage constipation, and get control of your bowel or bladder:  · Take stool softeners as directed  A stool softener is medicine that will make your bowel movements softer  This may prevent or relieve constipation  A stool softener may also make bowel movements less painful  · Drink plenty of liquids  Ask how much liquid to drink each day and which liquids are best for you  Liquids may help prevent constipation  · Eat foods high in fiber  Examples include fruits, vegetables, grains, beans, and lentils  Ask your healthcare provider how much fiber you need each day  Fiber may prevent constipation  · Do Kegel exercises as directed  Kegel exercises will help strengthen the muscles that control bowel movements and urination  Ask your healthcare provider for more information on Kegel exercises  · Apply cold compresses or medicine to hemorrhoids as directed  This may relieve swelling and pain  Your healthcare provider may tell you to apply ice or wipes that contain medicine to your hemorrhoids  He or she may also tell you to use a sitz bath  Ask your provider for more information on how to manage hemorrhoids  Nutrition:  Good nutrition is important in the postpartum period  It will help you return to a healthy weight, increase your energy levels, and prevent constipation  It will also help you get enough nutrients and calories if you are going to breastfeed your baby  · Eat a variety of healthy foods  Healthy foods include fruits, vegetables, whole-grain breads, low-fat dairy products, beans, lean meats, and fish  You may need 500 to 700 extra calories each day if you breastfeed your baby  You may also need extra protein  · Limit foods with added sugar and high amounts of fat  These foods are high in calories and low in healthy nutrients  Read food labels so you know how much sugar and fat is in the food you want to eat  · Drink 8 to 10 glasses of water per day  Water will help you make plenty of milk for your baby  It will also help prevent constipation  Drink a glass of water every time you breastfeed your baby  · Take vitamins as directed  Ask your healthcare provider what vitamins you need  · Limit caffeine and alcohol if you are breastfeeding  Caffeine and alcohol can get into your breast milk  Caffeine and alcohol can make your baby fussy  They can also interfere with your baby's sleep  Ask your healthcare provider if you can drink alcohol or caffeine  Rest and sleep: You may feel very tired in the postpartum period   Enough sleep will help you heal and give you energy to care for your baby  The following may help you get sleep and rest:  · Nap when your baby naps  Your baby may nap several times during the day  Get rest during this time  · Limit visitors  Many people may want to see you and your baby  Ask friends or family to visit on different days  This will give you time to rest     · Do not plan too much for one day  Put off household chores so that you have time to rest  Gradually do more each day  · Ask for help from family, friends, or neighbors  Ask them to help you with laundry, cleaning, or errands  Also ask someone to watch the baby while you take a nap or relax  Ask your partner to help with the care of your baby  Pump some of your breast milk so your partner can feed your baby during the night  Exercise after delivery:  Wait until your healthcare provider says it is okay to exercise  Exercise can help you lose weight, increase your energy levels, and manage your mood  It can also prevent constipation and blood clots  Start with gentle exercises such as walking  Do more as you have more energy  You may need to avoid abdominal exercises for 1 to 2 weeks after you deliver  Talk to your healthcare provider about an exercise plan that is right for you  Sexual activity after delivery:   · Do not have sex until your healthcare provider says it is okay  You may need to wait 4 to 6 weeks before you have sex  This may prevent infection and allow time to heal     · Your menstrual cycle may begin as soon as 3 weeks after you deliver  Your period may be delayed if you breastfeed your baby  You can become pregnant before you get your first postpartum period  Talk to your healthcare provider about birth control that is right for you  Some types of birth control are not safe during breastfeeding  For support and more information:  Join a support group for new mothers   Ask for help from family and friends with chores, errands, and care of your baby  · Office of Women's Health,  Department of Health and Human Services  [address], [address] [facility] [address]  [address], [address] [facility] [address]  Phone: [phone]  Web Address: www womenshealth gov  · [facility] \[facility] [place] [facility]\"" , [address]  [address] [facility] , [address] [place] [address]  Web Address: [employer] [email or web address]  Follow up with your doctor or obstetrician as directed: You will need to follow up within 2 to 6 weeks of delivery  Write down your questions so you remember to ask them at your visits  © Copyright [address] Information is for End User's use only and may not be sold, redistributed or otherwise used for commercial purposes  All illustrations and images included in CareNotes® are the copyrighted property of [employer]  or [facility] [place] Jay Helm   The above information is an  only  It is not intended as medical advice for individual conditions or treatments  Talk to your doctor, nurse or pharmacist before following any medical regimen to see if it is safe and effective for you

## 2021-03-01 NOTE — OB LABOR/OXYTOCIN SAFETY PROGRESS
Labor Progress Note - Sobeida Perez 21 y o  female MRN: [id number]    Unit/Bed#: L&D 325-01 Encounter: [id number]       Contraction Frequency (minutes): 3-4  Contraction Quality: Mild      Cervical Dilation: Fingertip        Cervical Effacement: 50  Fetal Station: -1  Baseline Rate: 120 bpm                     Vital Signs:   Vitals:    03/01/21 0400   BP: 133/84   Pulse: 81   Resp:    Temp:            Notes/comments:      Patient has received two doses of Cytotec and began aria regularly and becoming much more uncomfortable  On exam, her cervix was very posterior and thick, unable to easily feel the internal os  She was placed in stirrups and a cervical baker balloon was placed under direct visualization using a speculum  Upon feeding the baker balloon through the cervix, there was noted to be a significant amount of blood return through the baker with brisk bleeding  The baker was inflated with 60 cc of normal saline and then clamped off  Afterwards, there was still active bleeding and Dr Tunde Parks was called into the room to assess the situation  A digital sterile exam was done and there were no clots present and baker balloon was in place  The bleeding then began to slow down    The total QBL was 437 cc from the chucks pads and green cloth pad  Patient was reevaluated 10-15 minutes afterwards and the bleeding had stopped  She has a starting hemoglobin of 8 9 and received Venofer 300 on admission  She has now been typed and crossed for 2 units  BP remained stable throughout this episode  Patient received Stadol prior to placement, though she is still feeling pain with regular contractions  FHT has been category I, reactive with moderate variability and no decelerations       Dr Candis Aktinson is aware         Shawn Osei MD 3/1/2021 6:15 AM

## 2021-03-01 NOTE — OB LABOR/OXYTOCIN SAFETY PROGRESS
Labor Progress Note - Kim Hernandez 21 y o  female MRN: [id number]    Unit/Bed#: L&D 325-01 Encounter: [id number]    Dose (aniyah-units/min) Oxytocin: 2 aniyah-units/min  Contraction Frequency (minutes): 3  Contraction Quality: Mild, Moderate  Tachysystole: No   Cervical Dilation: 4-5        Cervical Effacement: 80  Fetal Station: -3  Baseline Rate: 135 bpm  Fetal Heart Rate: 150 BPM  FHR Category: Category I  Oxytocin Safety Progress Check: Safety check completed            Vital Signs:   Vitals:    03/01/21 0809   BP: 105/54   Pulse: 81   Resp:    Temp:            Notes/comments:   Clear fluid noted in bed by nursing around 1000, we were notified  Nitrazine positive  SVE 4-5/80/-3  Membranes feel ruptured however there is a forebag in place  Baker balloon still in place  Balloon was gently deflated and baker was gently removed without complication  Pitocin was started at 0953  Continue titration for now  Recheck in 2 hours  Pt feeling nauseous, will give Zofran IV  D/w Dr Shahzad Meadows MD 3/1/2021 10:27 AM

## 2021-03-01 NOTE — OB LABOR/OXYTOCIN SAFETY PROGRESS
Oxytocin Safety Progress Check Note - Nieves Perdue 21 y o  female MRN: [id number]    Unit/Bed#: L&D 325-01 Encounter: [id number]    Dose (aniyah-units/min) Oxytocin: 8 aniyah-units/min  Contraction Frequency (minutes): 2  Contraction Quality: Moderate, Mild  Tachysystole: No   Cervical Dilation: 10  Dilation Complete Date: 03/01/21  Dilation Complete Time: 1415  Cervical Effacement: 100  Fetal Station: 2  Baseline Rate: 130 bpm  Fetal Heart Rate: 130 BPM  FHR Category: Category I  Oxytocin Safety Progress Check: Safety check completed          Vitals:    03/01/21 1321   BP:    Pulse:    Resp:    Temp: 98 6 °F (37 °C)           Notes/comments:      Complete, +2 station  Bulging membrane/forebag palpated  Dr Claude Hoffmann on his way        Yvette Serrano MD 3/1/2021 2:17 PM

## 2021-03-01 NOTE — OB LABOR/OXYTOCIN SAFETY PROGRESS
Labor Progress Note - Rene Caro 21 y o  female MRN: [id number]    Unit/Bed#: L&D 325-01 Encounter: [id number]    Dose (aniyah-units/min) Oxytocin: 6 aniyah-units/min  Contraction Frequency (minutes): 2  Contraction Quality: Moderate, Mild  Tachysystole: No   Cervical Dilation: 4-5        Cervical Effacement: 80  Fetal Station: -3  Baseline Rate: 130 bpm  Fetal Heart Rate: 130 BPM  FHR Category: Category I  Oxytocin Safety Progress Check: Safety check completed            Vital Signs:   Vitals:    03/01/21 1154   BP: 115/63   Pulse: 88   Resp:    Temp:            Notes/comments:   Pt comfortable, sleeping at time of this visit  Last SVE was two hours ago  Pt desires to sleep longer prior to next exam    Will recheck cervix in another 1-2 hours  Continue pitocin titration      Usman Rios MD 3/1/2021 12:34 PM

## 2021-03-01 NOTE — OB LABOR/OXYTOCIN SAFETY PROGRESS
Labor Progress Note - Wilmer Lewis 21 y o  female MRN: [id number]    Unit/Bed#: L&D 325-01 Encounter: [id number]       Contraction Frequency (minutes): 4-5  Contraction Quality: Mild      Cervical Dilation: Closed        Cervical Effacement: 30  Fetal Station: -1  Baseline Rate: 13 bpm                     Vital Signs:   Vitals:    03/01/21 0114   BP: 113/58   Pulse: 72   Resp:    Temp:            Notes/comments:      Cervix unchanged, having some contractions on toco but just feeling mild cramping  Second dose of vaginal Cytotec placed  FHT category I  Will recheck in 3 hours       Yinka Alexandre MD 3/1/2021 1:25 AM

## 2021-03-01 NOTE — PLAN OF CARE
Problem: Knowledge Deficit  Goal: Verbalizes understanding of labor plan  Description: Assess patient/family/caregiver's baseline knowledge level and ability to understand information  Provide education via patient/family/caregiver's preferred learning method at appropriate level of understanding  1  Provide teaching at level of understanding  2  Provide teaching via preferred learning method(s)  Outcome: Completed     Problem: Labor & Delivery  Goal: Manages discomfort  Description: Assess and monitor for signs and symptoms of discomfort  Assess patient's pain level regularly and per hospital policy  Administer medications as ordered  Support use of nonpharmacological methods to help control pain such as distraction, imagery, relaxation, and application of heat and cold  Collaborate with interdisciplinary team and patient to determine appropriate pain management plan  1  Include patient in decisions related to comfort  2  Offer non-pharmacological pain management interventions  3  Report ineffective pain management to physician  Outcome: Completed     Problem: Labor & Delivery  Goal: Patient vital signs are stable  Description: 1  Assess vital signs - vaginal delivery    Outcome: Completed     Problem: PAIN - ADULT  Goal: Verbalizes/displays adequate comfort level or baseline comfort level  Description: Interventions:  - Encourage patient to monitor pain and request assistance  - Assess pain using appropriate pain scale  - Administer analgesics based on type and severity of pain and evaluate response  - Implement non-pharmacological measures as appropriate and evaluate response  - Consider cultural and social influences on pain and pain management  - Notify physician/advanced practitioner if interventions unsuccessful or patient reports new pain  Outcome: Progressing     Problem: INFECTION - ADULT  Goal: Absence or prevention of progression during hospitalization  Description: INTERVENTIONS:  - Assess and monitor for signs and symptoms of infection  - Monitor lab/diagnostic results  - Monitor all insertion sites, i e  indwelling lines, tubes, and drains  - Monitor endotracheal if appropriate and nasal secretions for changes in amount and color  - [place] appropriate cooling/warming therapies per order  - Administer medications as ordered  - Instruct and encourage patient and family to use good hand hygiene technique  - Identify and instruct in appropriate isolation precautions for identified infection/condition  Outcome: Progressing     Problem: SAFETY ADULT  Goal: Patient will remain free of falls  Description: INTERVENTIONS:  - Assess patient frequently for physical needs  -  Identify cognitive and physical deficits and behaviors that affect risk of falls    -  [place] fall precautions as indicated by assessment   - Educate patient/family on patient safety including physical limitations  - Instruct patient to call for assistance with activity based on assessment  - Modify environment to reduce risk of injury  - Consider OT/PT consult to assist with strengthening/mobility  Outcome: Progressing     Problem: BIRTH - VAGINAL/ SECTION  Goal: Fetal and maternal status remain reassuring during the birth process  Description: INTERVENTIONS:  - Monitor vital signs  - Monitor fetal heart rate  - Monitor uterine activity  - Monitor labor progression (vaginal delivery)  - DVT prophylaxis  - Antibiotic prophylaxis  Outcome: Completed  Goal: Emotionally satisfying birthing experience for mother/fetus  Description: Interventions:  - Assess, plan, implement and evaluate the nursing care given to the patient in labor  - Advocate the philosophy that each childbirth experience is a unique experience and support the family's chosen level of involvement and control during the labor process   - Actively participate in both the patient's and family's teaching of the birth process  - Consider cultural, [place] and age-specific factors and plan care for the patient in labor  Outcome: Completed

## 2021-03-01 NOTE — H&P
H&P Exam - Obstetrics   Kely Winkler 21 y o  female MRN: [id number]  Unit/Bed#: L&D 325-01 Encounter: [id number]      ASSESSMENT:  26yo  at 39w3d weeks gestation who is being admitted for an elective IOL  EFW: 7 5 lbs  VTX by transabdominal ultrasound     PLAN:    Pregnancy at 39w3d  Admit  Follow up CBC, RPR, Blood Type  Start with vaginal Cytotec   GBS negative status  Analgesia and/or epidural at patient request  Anticipate     Discussed with Dr Tim Guajardo       This patient will be an INPATIENT  and I certify the anticipated length of stay is >2 Midnights  History of Present Illness     Chief Complaint: Here for IOL    HPI:  Kely Winkler is a 21 y o  Marc Bugler female with an VAHID of 3/4/2021, by Last Menstrual Period at 39w3d weeks gestation who is being admitted for an elective induction of labor  She is not feeling any contractions and denies leakage of fluid, vaginal bleeding, and reports good fetal movement  She is a [facility] patient  PREGNANCY COMPLICATIONS:   1  Anemia (8 9)  2  Depression, on Zoloft  3  COVID during pregnancy (Dec)  4  Marginal cord insertion on     OB History    Para Term  AB Living   2 0 0 0 1 0   SAB TAB Ectopic Multiple Live Births   1 0 0 0 0      # Outcome Date GA Lbr Lino/2nd Weight Sex Delivery Anes PTL Lv   2 Current            1 SAB                Baby complications/comments: None    Review of Systems   Constitutional: Negative for chills and fever  Eyes: Negative for visual disturbance  Respiratory: Negative for cough and shortness of breath  Cardiovascular: Negative for chest pain, palpitations and leg swelling  Gastrointestinal: Negative for abdominal pain, diarrhea, nausea and vomiting  Genitourinary: Negative for dysuria, hematuria, pelvic pain, vaginal bleeding, vaginal discharge and vaginal pain  Neurological: Negative for dizziness, light-headedness and headaches           Historical Information   Past Medical History:   Diagnosis Date    Abnormal ECG     no diagnosis    Anemia     Anxiety     Asthma     Depression     Endometriosis     Frequent sinus infections     GERD (gastroesophageal reflux disease)     Migraine     Syncope     Trauma     Varicella     vaccine     Past Surgical History:   Procedure Laterality Date    TONSILLECTOMY  2006    UPPER GASTROINTESTINAL ENDOSCOPY  05/18/2017    Dysphagia, dilated to 47 [facility]   UPPER GASTROINTESTINAL ENDOSCOPY  10/07/2016    Dysphagia, dilated to 47 [facility]  Biopsies negative for eosinophilic esophagitis    UPPER GASTROINTESTINAL ENDOSCOPY  02/22/2018    Dysphagia, dilated to 64 [facility]  Biopsies negative for eosinophilic esophagitis    WISDOM TOOTH EXTRACTION       Social History   Social History     Substance and Sexual Activity   Alcohol Use Not Currently    Comment: social     Social History     Substance and Sexual Activity   Drug Use Not Currently    Types: Marijuana    Comment: last time a year ago      Social History     Tobacco Use   Smoking Status Never Smoker   Smokeless Tobacco Never Used     Family History: non-contributory    Meds/Allergies      Medications Prior to Admission   Medication    albuterol (PROVENTIL HFA,VENTOLIN HFA) 90 mcg/act inhaler    aspirin 81 mg chewable tablet    Ferrex 150 150 MG capsule    Ferrous Sulfate (SLOW FE PO)    metoclopramide (REGLAN) 5 mg tablet    ondansetron (ZOFRAN) 4 MG/5ML solution    pantoprazole (PROTONIX) 40 mg tablet    Prenatal Multivit-Min-Fe-FA (PRENATAL 1 + IRON PO)    sertraline (ZOLOFT) 50 mg tablet    sucralfate (CARAFATE) 1 g tablet        Allergies   Allergen Reactions    Strawberry C [Ascorbate] Anaphylaxis    Mold Extract [Trichophyton]     Cat Hair Extract        OBJECTIVE:    Vitals: Last menstrual period 05/28/2020, not currently breastfeeding  There is no height or weight on file to calculate BMI      Physical Exam  Constitutional:       General: She is not in acute distress  Appearance: She is well-developed  HENT:      Head: Normocephalic and atraumatic  Cardiovascular:      Rate and Rhythm: Normal rate and regular rhythm  Heart sounds: Normal heart sounds  Pulmonary:      Effort: Pulmonary effort is normal  No respiratory distress  Breath sounds: Normal breath sounds  Abdominal:      Tenderness: There is no abdominal tenderness  There is no guarding  Comments: Gravid uterus   Genitourinary:     General: Normal vulva  Vagina: No vaginal discharge  Musculoskeletal:         General: No tenderness  Skin:     General: Skin is warm and dry  Neurological:      General: No focal deficit present  Mental Status: She is alert and oriented to person, place, and time  Mental status is at baseline  Psychiatric:         Mood and Affect: Mood normal          Behavior: Behavior normal          Thought Content:  Thought content normal          Judgment: Judgment normal           Cervix:   0/30/-1    Fetal heart rate:    135 BPM reactive with moderate variability and no decelerations     [place]:    None    GBS: Negative     Prenatal Labs:   Blood Type:   Lab Results   Component Value Date/Time    ABO Grouping O 08/17/2020 12:01 PM     , D (Rh type):   Lab Results   Component Value Date/Time    Rh Type Positive 08/17/2020 12:01 PM   HCT/HGB:   Lab Results   Component Value Date/Time    Hematocrit 28 7 (L) 02/28/2021 08:36 PM    Hemoglobin 8 9 (L) 02/28/2021 08:36 PM      , MCV:   Lab Results   Component Value Date/Time    MCV 81 (L) 02/28/2021 08:36 PM      , Platelets:   Lab Results   Component Value Date/Time    Platelets 342 33/67/3548 08:36 PM      , 1 hour Glucola:   Lab Results   Component Value Date/Time    Glucose 92 11/12/2020 10:13 AM   Rubella: Immune   VDRL/RPR:   Lab Results   Component Value Date/Time    RPR Non Reactive 11/12/2020 10:13 AM      , Urine Culture/Screen:   Lab Results   Component Value Date/Time    Urine Culture 50,000-59,000 cfu/ml  08/01/2020 04:08 PM      Hep B:   Lab Results   Component Value Date/Time    Hepatitis B Surface Ag Non-reactive 08/02/2020 04:53 AM     HIV:   Lab Results   Component Value Date/Time    HIV-1/HIV-2 AB Non-Reactive 11/12/2020     , Chlamydia:   Lab Results   Component Value Date/Time    External Chlamydia Screen Negative 07/28/2020     , Gonorrhea:   Lab Results   Component Value Date/Time    N gonorrhoeae, DNA Probe Negative 07/28/2020 11:30 AM    N gonorrhoeae, DNA Probe N  gonorrhoeae Amplified DNA Negative 04/26/2017 09:52 PM     , Group B Strep:    Lab Results   Component Value Date/Time    Strep Grp B PCR Negative for Beta Hemolytic Strep Grp B by PCR 02/04/2021 10:45 AM          Invasive Devices     None

## 2021-03-01 NOTE — DISCHARGE SUMMARY
Discharge Summary - OB/GYN   Tristen Muñoz 21 y o  female MRN: [id number]  Unit/Bed#: L&D 325-01 Encounter: [id number]      Admission Date: 2021     Discharge Date: 3/03/2021    Admitting Diagnosis:   1  Pregnancy at 39w4d  2  Anemia  3  Depression   4  COVID-19 during pregnancy   5  Marginal cord insertion     Discharge Diagnosis:   Same, delivered    Hemorrhage    Procedures: spontaneous vaginal delivery    Admitting Attending: Dr Price Seek  Delivery Attending: Leon Frias DO   Discharge Attending: Dr Dunn Come Course:     Tristen Muñoz is a 21 y o  Alan Bishop female who was initially admitted to &D on 2021 at 39w4d for [place]  On admission, her Hgb was noted to be 8 9; she received a venofer transfusion  Her labor course was notable for cervical ripening with 2 doses of cytotec  She then received a baker balloon for further cervical ripening  420cc blood loss was noted at time of baker balloon insertion  The patient was type and crossed for 2 u pRBCs  She received stadol and an epidural for pain management  She received pitocin titration for induction of labor  She had SROM for clear fluid  Baker balloon was removed after SROM occurred  She then progressed to complete without difficulty at 1415 and started pushing at 1437  She pushed for     She delivered a viable male  on 21 at 1 via spontaneous vaginal delivery  Patient tolerated the procedure well  Apgars were 7 (1 min) and 8 (5 min)  Weight was 7lbs 6 7oz   stayed in the delivery room after birth  Her post-partum course was complicated by a hemorrhage  Her QBL after the cervical baker balloon placement was 465 cc and after delivery was 769 cc for a total QBL of 1234 cc  Following delivery, she reported feeling lightheaded, fatigued, and was tachycardic  A CBC at that time was 7 3 but a repeat decreased to 6 4   She was transfused 2 units of pRBC and symptoms greatly improved and her vitals remained stable  The rest of her postpartum course was uncomplicated  She was spontaneously voiding after delivery  She was discharged on PPD#2, by which point her pain was controlled and she was tolerating PO  She had appropriate bowel function  She was discharged with standard post partum instructions to follow up with her physician in 3 weeks  Complications: none apparent    Condition at discharge: good     Discharge instructions/Information to patient and family:   See after visit summary for information provided to patient and family  Provisions for Follow-Up Care:  See after visit summary for information related to follow-up care and any pertinent home health orders  Disposition: Home    Planned Readmission: No    Discharge instructions/Information to patient and family:   - Do not place anything (no partner, tampons or douche) in your vagina for 6 weeks  -You may gradually return to your usual activities as tolerated  Avoid heavy lifting for the first few weeks    -Please do not drive for 1 week if you have no stitches and for 2 weeks if you have stitches or underwent a  delivery     -You may take baths or shower per your preference  -Examine your breasts in the mirror daily and call for redness or tenderness or increased warmth    -Please call us for temperature > 100 4*F or 38* C, worsening pain or a foul discharge  Discharge Medications:   Prenatal vitamin daily for 6 months or the duration of nursing whichever is longer  Motrin 600 mg orally every 6 hours as needed for pain  Tylenol (over the counter) per bottle directions as needed for pain: do NOT use with percocet  Hydrocortisone cream 1% (over the counter) applied 1-2x daily to hemorrhoids as needed    Provisions for Follow-Up Care: Follow up with your doctor in 4-6 weeks for postpartum care

## 2021-03-01 NOTE — ANESTHESIA POSTPROCEDURE EVALUATION
Post-Op Assessment Note    CV Status:  Stable    Pain management: adequate     Mental Status:  Alert and awake   Hydration Status:  Euvolemic   PONV Controlled:  Controlled   Airway Patency:  Patent      Post Op Vitals Reviewed: Yes      Staff: Anesthesiologist     Post-op block assessment: site cleaned and catheter intact      No complications documented      BP      Temp      Pulse     Resp      SpO2

## 2021-03-01 NOTE — ANESTHESIA PREPROCEDURE EVALUATION
Procedure:  LABOR ANALGESIA    Relevant Problems   CARDIO (within normal limits)      GI/HEPATIC   (+) GERD (gastroesophageal reflux disease)      GYN   (+) 39 weeks gestation of pregnancy      HEMATOLOGY   (+) Anemia affecting pregnancy, antepartum      NEURO/PSYCH   (+) Major depressive disorder, recurrent severe without psychotic features (HCC)   (+) Post traumatic stress disorder (PTSD)      PULMONARY (within normal limits)        Physical Exam    Airway    Mallampati score: I  TM Distance: >3 FB  Neck ROM: full     Dental   No notable dental hx     Cardiovascular  Cardiovascular exam normal    Pulmonary  Pulmonary exam normal     Other Findings        Anesthesia Plan  ASA Score- 2     Anesthesia Type- epidural with ASA Monitors  Additional Monitors:   Airway Plan:           Plan Factors-    Chart reviewed  Existing labs reviewed  Induction-     Postoperative Plan-     Informed Consent- Anesthetic plan and risks discussed with patient

## 2021-03-01 NOTE — L&D DELIVERY NOTE
Vaginal Delivery Summary - OB/GYN   Pinky Nobles 21 y o  female MRN: [id number]  Unit/Bed#: L&D 325-01 Encounter: [id number]        Pre-delivery Diagnosis:   1  Pregnancy at 39w4d   2  Anemia  3  Depression    Post-delivery Diagnosis: same, delivered    Procedure: Spontaneous Vaginal Delivery, repair of 2nd degree perineal laceration    Attending: Juan Shafer    Assistant(s): Dominick    Anesthesia: Epidural    QBL: 891OW    Complications: none apparent    Specimens:   1  Arterial and venous cord gases  2  Cord blood  3  Segment of umbilical cord  4  Placenta to storage     Findings:  1  Viable male on 3/1/2021 at 1507, with APGARS of 7 and 8 at 1 and 5 minutes respectively  2  Spontaneous delivery of intact placenta at 1509  3  2nd degree laceration repaired with 3-0 Vicryl rapide  4  Blood gases:   Arterial pH: 7 262   Arterial base excess: -5 5   Venous pH: 7 333   Venous base excess: -3 9    Disposition:  Patient tolerated the procedure well and was recovering in labor and delivery room       Brief history and labor course:  Ms Pinky Nobles is a 21 y o   at 39wk4d  She presented to labor and delivery for elective IOL  Her pregnancy was complicated by anemia and depression  On arrival, she was closed/thick/high  She received two doses of cervical misoprostol for cervical ripening  A baker balloon was then placed, during which patient had brisk bright red vaginal bleeding (QBL 420ml)  Her Hgb remained stable at 8 4  She received an epidural and was started on pitocin  She then spontaneously ruptured and the baker balloon was removed, at which point patient was 4/80/-3  Patient then progressed to completely shortly after and began pushing  Description of procedure:  After pushing for 30 minutes, at 1507 patient delivered a viable male , wt pending, apgars of 7 (1 min) and 8 (5 min)  The fetal vertex delivered spontaneously  There was no nuchal cord   The anterior shoulder delivered atraumatically with maternal expulsive forces and the assistance of gentle downward traction  The posterior shoulder delivered with maternal expulsive forces and the assistance of gentle upward traction  The remainder of the fetus delivered spontaneously  Upon delivery, the infant was placed on the mothers abdomen and the cord was clamped and cut  The infant was noted to cry spontaneously and was moving all extremities appropriately  There was no evidence for injury  Awaiting nurse resuscitators evaluated the   Arterial and venous cord blood gases and cord blood was collected for analysis  These were promptly sent to the lab  In the immediate post-partum, 30 units of IV pitocin was administered, and the uterus was noted to contract down well with massage and pitocin  The placenta delivered spontaneously at 1509 and was noted to have a centrally inserted 3 vessel cord  The vagina, cervix, perineum, and rectum were inspected and there was noted to be a 2nd degree perineal laceration  The apex of the vaginal laceration was identified and a suture of 3-0 Vicryl Rapide was placed 1cm above the apex  The vaginal mucosa and underlying rectovaginal fascia were closed using a running locked suture to the hymenal ring  The suture was then brought underneath the hymenal ring  A stitch was then placed through the bulbocavernosus muscle  Continuing with the same suture, the transverse perineal muscles were re-approximated  The suture was brought to the posterior apex of the skin laceration and then the skin was re-approximated in a subcuticular fashion to the hymenal ring  Hemostasis was achieved  At the conclusion of the procedure, all needle, sponge, and instrument counts were noted to be correct  Patient tolerated the procedure well and was allowed to recover in labor and delivery room with family and  before being transferred to the post-partum floor   Dr Laura Lee was present and participated in all key portions of the case        Joan Lei MD  OB/GYN PGY-3  3/1/2021  3:32 PM

## 2021-03-01 NOTE — OB LABOR/OXYTOCIN SAFETY PROGRESS
Oxytocin Safety Progress Check Note - Marion Mention 21 y o  female MRN: [id number]    Unit/Bed#: L&D 325-01 Encounter: [id number]    Dose (aniyah-units/min) Oxytocin: 6 aniyah-units/min  Contraction Frequency (minutes): 2  Contraction Quality: Moderate, Mild  Tachysystole: No   Cervical Dilation: 4-5  Cervical Effacement: 80  Fetal Station: -3  Baseline Rate: 130 bpm  Fetal Heart Rate: 130 BPM  FHR Category: Category I  Oxytocin Safety Progress Check: Safety check completed            Vitals:    03/01/21 1154   BP: 115/63   Pulse: 88   Resp:    Temp:            Notes/comments:      Comfortable w/ epidural  Cervical exam deferred  Continue pitocin titration as tolerated  Cat I  Will check in about 2 hours or as clinically indicated      Joan Pitts MD 3/1/2021 12:57 PM

## 2021-03-01 NOTE — ANESTHESIA PROCEDURE NOTES
Epidural Block    Patient location during procedure: OB  Start time: 3/1/2021 7:10 AM  Reason for block: at surgeon's request and primary anesthetic  Staffing  Anesthesiologist: Kristi Solomon MD  Performed: anesthesiologist   Preanesthetic Checklist  Completed: patient identified, site marked, surgical consent, pre-op evaluation, timeout performed, IV checked, risks and benefits discussed and monitors and equipment checked  Epidural  Patient position: sitting  Prep: Betadine  Patient monitoring: frequent blood pressure checks  Approach: midline  Location: lumbar (1-5)  Injection technique: DANIEL saline  Needle  Needle type: Tuohy   Needle gauge: 18 G  Catheter type: side hole  Catheter size: 20 G  Test dose: negativeropivacaine (NAROPIN) 0 2% epidural injection, 10 mL  Assessment  Sensory level: [id number] aspiration for CSF, negative aspiration for heme and no paresthesia on injection  patient tolerated the procedure well with no immediate complications

## 2021-03-02 LAB
ERYTHROCYTE [DISTWIDTH] IN BLOOD BY AUTOMATED COUNT: 14.3 % (ref 11.6–15.1)
ERYTHROCYTE [DISTWIDTH] IN BLOOD BY AUTOMATED COUNT: 14.4 % (ref 11.6–15.1)
HCT VFR BLD AUTO: 20.7 % (ref 34.8–46.1)
HCT VFR BLD AUTO: 27.8 % (ref 34.8–46.1)
HGB BLD-MCNC: 6.4 G/DL (ref 11.5–15.4)
HGB BLD-MCNC: 8.6 G/DL (ref 11.5–15.4)
MCH RBC QN AUTO: 25.2 PG (ref 26.8–34.3)
MCH RBC QN AUTO: 25.4 PG (ref 26.8–34.3)
MCHC RBC AUTO-ENTMCNC: 30.9 G/DL (ref 31.4–37.4)
MCHC RBC AUTO-ENTMCNC: 30.9 G/DL (ref 31.4–37.4)
MCV RBC AUTO: 82 FL (ref 82–98)
MCV RBC AUTO: 82 FL (ref 82–98)
PLATELET # BLD AUTO: 207 THOUSANDS/UL (ref 149–390)
PLATELET # BLD AUTO: 224 THOUSANDS/UL (ref 149–390)
PMV BLD AUTO: 10.5 FL (ref 8.9–12.7)
PMV BLD AUTO: 10.7 FL (ref 8.9–12.7)
RBC # BLD AUTO: 2.54 MILLION/UL (ref 3.81–5.12)
RBC # BLD AUTO: 3.38 MILLION/UL (ref 3.81–5.12)
WBC # BLD AUTO: 14.09 THOUSAND/UL (ref 4.31–10.16)
WBC # BLD AUTO: 14.99 THOUSAND/UL (ref 4.31–10.16)

## 2021-03-02 PROCEDURE — P9016 RBC LEUKOCYTES REDUCED: HCPCS

## 2021-03-02 PROCEDURE — 85027 COMPLETE CBC AUTOMATED: CPT | Performed by: OBSTETRICS & GYNECOLOGY

## 2021-03-02 PROCEDURE — 99024 POSTOP FOLLOW-UP VISIT: CPT | Performed by: OBSTETRICS & GYNECOLOGY

## 2021-03-02 RX ADMIN — IBUPROFEN 600 MG: 600 TABLET ORAL at 20:34

## 2021-03-02 RX ADMIN — SERTRALINE HYDROCHLORIDE 50 MG: 50 TABLET ORAL at 09:28

## 2021-03-02 RX ADMIN — PANTOPRAZOLE SODIUM 40 MG: 40 TABLET, DELAYED RELEASE ORAL at 16:07

## 2021-03-02 RX ADMIN — PANTOPRAZOLE SODIUM 40 MG: 40 TABLET, DELAYED RELEASE ORAL at 06:22

## 2021-03-02 RX ADMIN — IBUPROFEN 600 MG: 600 TABLET ORAL at 07:32

## 2021-03-02 RX ADMIN — SUCRALFATE 1 G: 1 TABLET ORAL at 18:09

## 2021-03-02 RX ADMIN — IBUPROFEN 600 MG: 600 TABLET ORAL at 01:18

## 2021-03-02 RX ADMIN — ACETAMINOPHEN 650 MG: 325 TABLET, COATED ORAL at 20:35

## 2021-03-02 RX ADMIN — IBUPROFEN 600 MG: 600 TABLET ORAL at 14:18

## 2021-03-02 RX ADMIN — DOCUSATE SODIUM 100 MG: 100 CAPSULE, LIQUID FILLED ORAL at 18:09

## 2021-03-02 RX ADMIN — ACETAMINOPHEN 650 MG: 325 TABLET, COATED ORAL at 04:23

## 2021-03-02 RX ADMIN — BENZOCAINE AND LEVOMENTHOL: 200; 5 SPRAY TOPICAL at 22:39

## 2021-03-02 RX ADMIN — WITCH HAZEL 1 PAD: 500 SOLUTION RECTAL; TOPICAL at 22:39

## 2021-03-02 RX ADMIN — SUCRALFATE 1 G: 1 TABLET ORAL at 09:28

## 2021-03-02 RX ADMIN — DOCUSATE SODIUM 100 MG: 100 CAPSULE, LIQUID FILLED ORAL at 09:28

## 2021-03-02 RX ADMIN — ACETAMINOPHEN 650 MG: 325 TABLET, COATED ORAL at 14:20

## 2021-03-02 NOTE — UTILIZATION REVIEW
Notification of Maternity/Delivery & Tracy Birth Information for Admission   Notification of Maternity/Delivery for Admission to our facility [address]  Be advised that this patient was admitted to our facility under Inpatient Status  Contact Kevin Vaughn at [phone] for additional admission information  [facility] PARENT/CHILD HE[facility] DEPT  DEDICATED [phone]  Mother & Tracy Information   Patient Name: Kiesha Apodaca YOB: 2000   Delivering clinician: [facility]   OB History        2    Para   1    Term   1       0    AB   1    Living   1       SAB   1    TAB   0    Ectopic   0    Multiple   0    Live Births   1               Tracy Name & MRN:   Information for the patient's :  Alexandrestacie John) [[id number]]      Delivery Information:  Sex: male  Delivered 3/1/2021 3:07 PM by Vaginal, Spontaneous; Gestational Age: 43w3d    Tracy Measurements:  Weight: 7 lb 6 7 oz (3365 g); Height: 20 5"    APGAR 1 minute 5 minutes 10 minutes   Totals: 7 8       Birth Information: 21 y o  female MRN: [id number] Unit/Bed#: L&D 308-01 Estimated Date of Delivery: 3/4/21  Birthweight: No birth weight on file  Gestational Age: <None> Delivery Type: Vaginal, Spontaneous          APGARS  One minute Five minutes Ten minutes   Totals:                 [address]   [address]:   Dave Roy  Tax ID: [id number]  NPI: [id number] Attending Provider/NPI:  Phone:  Address: Deb Samaniego [[id number]]  [phone]  Same as Facility   Place of Service Code: 24 Place of Service Name: [address] [place] [address]   Start Date: 21   Discharge Date & Time: No discharge date for patient encounter      Type of Admission: Inpatient Status Discharge Disposition (if discharged): Home/Self Care   Patient Diagnoses:   Encounter for induction of labor [Z34 90]  The encounter diagnosis was 39 weeks gestation of pregnancy  1  39 weeks gestation of pregnancy       Orders: Admission Orders (From admission, onward)     Ordered        02/28/21 2011  INPATIENT ADMISSION  Once                    Assigned Utilization Review Contact: Juanita Bonilla  Utilization   Network Utilization Review Department  Phone: [phone]; Fax [phone]  Email: [email or web address]

## 2021-03-02 NOTE — PLAN OF CARE
Problem: PAIN - ADULT  Goal: Verbalizes/displays adequate comfort level or baseline comfort level  Description: Interventions:  - Encourage patient to monitor pain and request assistance  - Assess pain using appropriate pain scale  - Administer analgesics based on type and severity of pain and evaluate response  - Implement non-pharmacological measures as appropriate and evaluate response  - Consider cultural and social influences on pain and pain management  - Notify physician/advanced practitioner if interventions unsuccessful or patient reports new pain  Outcome: Progressing     Problem: INFECTION - ADULT  Goal: Absence or prevention of progression during hospitalization  Description: INTERVENTIONS:  - Assess and monitor for signs and symptoms of infection  - Monitor lab/diagnostic results  - Monitor all insertion sites, i e  indwelling lines, tubes, and drains  - Monitor endotracheal if appropriate and nasal secretions for changes in amount and color  - [place] appropriate cooling/warming therapies per order  - Administer medications as ordered  - Instruct and encourage patient and family to use good hand hygiene technique  - Identify and instruct in appropriate isolation precautions for identified infection/condition  Outcome: Progressing     Problem: SAFETY ADULT  Goal: Patient will remain free of falls  Description: INTERVENTIONS:  - Assess patient frequently for physical needs  -  Identify cognitive and physical deficits and behaviors that affect risk of falls    -  [place] fall precautions as indicated by assessment   - Educate patient/family on patient safety including physical limitations  - Instruct patient to call for assistance with activity based on assessment  - Modify environment to reduce risk of injury  - Consider OT/PT consult to assist with strengthening/mobility  Outcome: Progressing

## 2021-03-02 NOTE — PROGRESS NOTES
Progress Note - OB/GYN   Oscar Lynn 21 y o  female MRN: [id number]  Unit/Bed#: L&D 308-01 Encounter: [id number]    Assessment:  Post partum Day #1 s/p  with a total QBL of 1234 cc    Plan:    1  Symptomatic blood loss anemia w/ a hemorrhage   - QBL of 465 cc following placement of the baker balloon and then 769 cc after delivery   - Following delivery, patient was lightheaded on standing and tachycardia  She was given a bolus and felt somewhat better, but has reported feeling fatigued  - CBC was checked after delivery and hemoglobin was 7 3--> this morning returned at 6 4  - Patient with symptomatic anemia, will transfuse 2 units of pRBC and f/u 4 hour post transfusion CBC    2  Depression  - Continue Zoloft 50 mg, mood stable    3  GERD  - Continue home meds: Protonix 40 mg BID and Carafate 1g daily    4  Continue routine post partum care  - Encourage ambulation  - Encourage breastfeeding  - Anticipate discharge tomorrow       Subjective/Objective   Chief Complaint:     Post delivery  Lochia WNL  Pain well controlled  She reports still being somewhat lightheaded when ambulating and feels fatigued  Denies shortness of breath or palpitations      Subjective:     Pain: yes, cramping, improved with meds  Tolerating PO: yes  Voiding: yes  Ambulating: yes  Chest pain: no  Shortness of breath: no  Leg pain: no  Lochia: minimal    Objective:     Vitals: /70 (BP Location: Right arm)   Pulse 89   Temp 98 1 °F (36 7 °C) (Oral)   Resp 17   Ht 5' 5" (1 651 m)   Wt 90 3 kg (199 lb)   LMP 2020 (Exact Date)   SpO2 97%   Breastfeeding Yes   BMI 33 12 kg/m²       Intake/Output Summary (Last 24 hours) at 3/2/2021 0525  Last data filed at 3/1/2021 2217  Gross per 24 hour   Intake --   Output 3110 ml   Net -3110 ml       Lab Results   Component Value Date    WBC 14 09 (H) 2021    HGB 6 4 (LL) 2021    HCT 20 7 (L) 2021    MCV 82 2021     2021       Physical Exam: Gen: AAOx3, NAD  Abd: Soft, non-tender, non-distended, no rebound or guarding  Uterine fundus firm and non-tender, below the umbilicus     Ext: Non tender    Addison Roberts MD  3/2/2021  5:25 AM

## 2021-03-02 NOTE — PLAN OF CARE
Problem: PAIN - ADULT  Goal: Verbalizes/displays adequate comfort level or baseline comfort level  Description: Interventions:  - Encourage patient to monitor pain and request assistance  - Assess pain using appropriate pain scale  - Administer analgesics based on type and severity of pain and evaluate response  - Implement non-pharmacological measures as appropriate and evaluate response  - Consider cultural and social influences on pain and pain management  - Notify physician/advanced practitioner if interventions unsuccessful or patient reports new pain  Outcome: Progressing     Problem: INFECTION - ADULT  Goal: Absence or prevention of progression during hospitalization  Description: INTERVENTIONS:  - Assess and monitor for signs and symptoms of infection  - Monitor lab/diagnostic results  - Monitor all insertion sites, i e  indwelling lines, tubes, and drains  - Monitor endotracheal if appropriate and nasal secretions for changes in amount and color  - [place] appropriate cooling/warming therapies per order  - Administer medications as ordered  - Instruct and encourage patient and family to use good hand hygiene technique  - Identify and instruct in appropriate isolation precautions for identified infection/condition  Outcome: Progressing     Problem: SAFETY ADULT  Goal: Patient will remain free of falls  Description: INTERVENTIONS:  - Assess patient frequently for physical needs  -  Identify cognitive and physical deficits and behaviors that affect risk of falls    -  [place] fall precautions as indicated by assessment   - Educate patient/family on patient safety including physical limitations  - Instruct patient to call for assistance with activity based on assessment  - Modify environment to reduce risk of injury  - Consider OT/PT consult to assist with strengthening/mobility  Outcome: Progressing

## 2021-03-02 NOTE — LACTATION NOTE
This note was copied from a baby's chart  Met with mother  Provided mother with Ready, Set, Baby booklet  Discussed Skin to Skin contact an benefits to mom and baby  Talked about the delay of the first bath until baby has adjusted  Spoke about the benefits of rooming in  Feeding on cue and what that means for recognizing infant's hunger  Avoidance of pacifiers for the first month discussed  Talked about exclusive breastfeeding for the first 6 months  Positioning and latch reviewed as well as showing images of other feeding positions  Discussed the properties of a good latch in any position  Reviewed hand/manual expression  Discussed s/s that baby is getting enough milk and some s/s that breastfeeding dyad may need further help  Gave information on common concerns, what to expect the first few weeks after delivery, preparing for other caregivers, and how partners can help  Resources for support also provided  Mother verbalized breastfeeding is going well on the right side, but is having some difficulty on the left  I chela  cross cradle hold, how to hand express and how to get a deep latch  Baby latched well  Enc to call for assistance as needed,phone # given

## 2021-03-03 ENCOUNTER — TRANSITIONAL CARE MANAGEMENT (OUTPATIENT)
Dept: FAMILY MEDICINE CLINIC | Facility: CLINIC | Age: 21
End: 2021-03-03

## 2021-03-03 VITALS
DIASTOLIC BLOOD PRESSURE: 79 MMHG | RESPIRATION RATE: 17 BRPM | TEMPERATURE: 98 F | HEIGHT: 65 IN | HEART RATE: 98 BPM | WEIGHT: 199 LBS | SYSTOLIC BLOOD PRESSURE: 121 MMHG | OXYGEN SATURATION: 95 % | BODY MASS INDEX: 33.15 KG/M2

## 2021-03-03 LAB
ABO GROUP BLD BPU: NORMAL
BPU ID: NORMAL
CROSSMATCH: NORMAL
UNIT DISPENSE STATUS: NORMAL
UNIT PRODUCT CODE: NORMAL
UNIT RH: NORMAL

## 2021-03-03 PROCEDURE — 99024 POSTOP FOLLOW-UP VISIT: CPT | Performed by: OBSTETRICS & GYNECOLOGY

## 2021-03-03 RX ORDER — ACETAMINOPHEN 325 MG/1
650 TABLET ORAL EVERY 6 HOURS SCHEDULED
Refills: 0
Start: 2021-03-03

## 2021-03-03 RX ORDER — DOCUSATE SODIUM 100 MG/1
100 CAPSULE, LIQUID FILLED ORAL 2 TIMES DAILY
Qty: 10 CAPSULE | Refills: 0
Start: 2021-03-03 | End: 2021-03-12 | Stop reason: SDUPTHER

## 2021-03-03 RX ORDER — IBUPROFEN 600 MG/1
600 TABLET ORAL EVERY 6 HOURS SCHEDULED
Qty: 30 TABLET | Refills: 0
Start: 2021-03-03 | End: 2021-04-27 | Stop reason: ALTCHOICE

## 2021-03-03 RX ADMIN — ACETAMINOPHEN 650 MG: 325 TABLET, COATED ORAL at 02:33

## 2021-03-03 RX ADMIN — IRON SUCROSE 200 MG: 20 INJECTION, SOLUTION INTRAVENOUS at 07:29

## 2021-03-03 RX ADMIN — PANTOPRAZOLE SODIUM 40 MG: 40 TABLET, DELAYED RELEASE ORAL at 06:52

## 2021-03-03 RX ADMIN — SERTRALINE HYDROCHLORIDE 50 MG: 50 TABLET ORAL at 09:10

## 2021-03-03 RX ADMIN — IBUPROFEN 600 MG: 600 TABLET ORAL at 02:33

## 2021-03-03 RX ADMIN — DOCUSATE SODIUM 100 MG: 100 CAPSULE, LIQUID FILLED ORAL at 09:10

## 2021-03-03 RX ADMIN — IBUPROFEN 600 MG: 600 TABLET ORAL at 07:29

## 2021-03-03 RX ADMIN — ACETAMINOPHEN 650 MG: 325 TABLET, COATED ORAL at 09:10

## 2021-03-03 RX ADMIN — SUCRALFATE 1 G: 1 TABLET ORAL at 09:10

## 2021-03-03 NOTE — SOCIAL WORK
CM consulted for edinburgh score of 10, first baby, young mother    MOB is Nick Barrett  FOB is Unknown  Infant is Donell Lin    Confirmed address:  [address] [place] [address]     Confirmed telephone numbers:   [phone]      Lives with: her parents  Support system: family, parents, has an older sister  Supplies: has all necessary items, carseat for discharge, crib/bassinet for safe sleep  Feeding: breast - has pump  Government assistance: WIC and [place]  Inf[facility] not [facility] under [employer], [facility]  Childcare: will be at home with baby for now  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

## 2021-03-03 NOTE — PLAN OF CARE
Problem: PAIN - ADULT  Goal: Verbalizes/displays adequate comfort level or baseline comfort level  Description: Interventions:  - Encourage patient to monitor pain and request assistance  - Assess pain using appropriate pain scale  - Administer analgesics based on type and severity of pain and evaluate response  - Implement non-pharmacological measures as appropriate and evaluate response  - Consider cultural and social influences on pain and pain management  - Notify physician/advanced practitioner if interventions unsuccessful or patient reports new pain  Outcome: Adequate for Discharge     Problem: INFECTION - ADULT  Goal: Absence or prevention of progression during hospitalization  Description: INTERVENTIONS:  - Assess and monitor for signs and symptoms of infection  - Monitor lab/diagnostic results  - Monitor all insertion sites, i e  indwelling lines, tubes, and drains  - Monitor endotracheal if appropriate and nasal secretions for changes in amount and color  - [place] appropriate cooling/warming therapies per order  - Administer medications as ordered  - Instruct and encourage patient and family to use good hand hygiene technique  - Identify and instruct in appropriate isolation precautions for identified infection/condition  Outcome: Adequate for Discharge     Problem: SAFETY ADULT  Goal: Patient will remain free of falls  Description: INTERVENTIONS:  - Assess patient frequently for physical needs  -  Identify cognitive and physical deficits and behaviors that affect risk of falls    -  [place] fall precautions as indicated by assessment   - Educate patient/family on patient safety including physical limitations  - Instruct patient to call for assistance with activity based on assessment  - Modify environment to reduce risk of injury  - Consider OT/PT consult to assist with strengthening/mobility  Outcome: Adequate for Discharge

## 2021-03-03 NOTE — LACTATION NOTE
[place] AMBULATORY ENCOUNTER  GASTROENTEROLOGY CONSULT NOTE    PCP: Antonette Neri DO  REQUESTING PROVIDER:  Antonette Neri DO    CHIEF COMPLAINT:  Consultation; Rectal Problem (pressure; ongoing for months); Other (fecal urgency; ongoing for months); Abdominal Pain (LUQ); and Constipation (x5 years)     SUBJECTIVE:    Mar Valdez is a 58 year old female seen today at the request of Antonette Neri DO for multiple GI complaints.    Patient reports history of intermittent epigastric and left upper quadrant discomfort.  She reports history of gastric ulcer.  She denies heartburn, nausea vomiting.    She reports having a colonoscopy in 2017 which found \"precancerous polyp.\"  Per medical record, last EGD was in 2017 and had mild gastritis.    Patient has history of breast cancer with resection and radiation therapy.  She has history of radiation esophagitis and stricture requiring dilation.  She denies symptoms of dysphagia currently.    Her maternal grandmother vital colon cancer in her 60s.   Her father  of alcoholic liver cirrhosis.     Patient is very concerned about intestinal cancer due to her personal history of breast cancer as well as family history of colon cancer. She says she gets colonoscopy every 2 years.    She denies diarrhea or constipation.  She denies melena or rectal bleeding.  She denies unexplained weight loss.      MEDICATIONS:    Current Outpatient Medications   Medication Sig Dispense Refill   • ranitidine (ZANTAC) 150 MG tablet Take 150 mg by mouth 2 times daily.     • LORazepam (ATIVAN) 0.5 MG tablet Take 1 tablet by mouth 2 times daily as needed for Anxiety. 60 tablet 0   • albuterol 108 (90 Base) MCG/ACT inhaler Inhale 2 puffs into the lungs every 4 hours as needed for Shortness of Breath or Wheezing. 1 Inhaler 0   • Multiple Vitamin (MULTIVITAMIN) capsule      • lidocaine (LIDODERM) 5 % patch Apply to the skin.     • Coenzyme Q-10 200 MG Cap Take by mouth.     • cholecalciferol (VITAMIN  This note was copied from a baby's chart  Met with mother  Provided mother with Ready, Set, Baby booklet  Discussed Skin to Skin contact an benefits to mom and baby  Talked about the delay of the first bath until baby has adjusted  Spoke about the benefits of rooming in  Feeding on cue and what that means for recognizing infant's hunger  Avoidance of pacifiers for the first month discussed  Talked about exclusive breastfeeding for the first 6 months  Positioning and latch reviewed as well as showing images of other feeding positions  Discussed the properties of a good latch in any position  Reviewed hand/manual expression  Discussed s/s that baby is getting enough milk and some s/s that breastfeeding dyad may need further help  Gave information on common concerns, what to expect the first few weeks after delivery, preparing for other caregivers, and how partners can help  Resources for support also provided  Grandmother supportive at bedside  C/O sore nipples  Information given about sore nipples and how to correct with positioning techniques  Discussed maneuvers to latch infant on properly to avoid nipple pain and promote healing  Discussed treatments that could be utilized to promote healing  Hydro gel dressings given with instruction and verbalization of understanding of cleaning and duration of use  With mom's permission, Worked on positioning infant up at chest level and starting to feed infant with nose arriving at the nipple  Then, using areolar compression to achieve a deep latch that is comfortable and exchanges optimum amounts of milk  Used cross cradle on left breast to guide baby to deep latch then cradle for comfort as per mom  Mom noted less discomfort immediately and relaxed tone of baby  [employer] MOB  to call for assistance, questions and concerns  Extension number for inpatient lactation support provided  D-1000 MAX ST) 1000 units tablet Take 500 Units by mouth daily.     • NON FORMULARY 5http     • omeprazole (PRILOSEC) 20 MG capsule Take 2 capsules by mouth daily. Take 1/2 hour before first meal of day 30 capsule 3   • cyclobenzaprine (FLEXERIL) 5 MG tablet 5 mg as needed. States cuts in half.     • fluticasone-salmeterol (ADVAIR) 100-50 MCG/DOSE AEPB Inhale 1 puff into the lungs two times daily.       No current facility-administered medications for this visit.        HISTORIES:    ALLERGIES:   Allergen Reactions   • Cat Dander Other (See Comments)     respiratory   • Codeine HIVES   • Hydroxyzine Other (See Comments)   • Latex      Respiratory however is negligible at this time per pt   • Sulfa Antibiotics HIVES     Past Medical History:   Diagnosis Date   • Breast cancer (CMS/HCC)    • Esophageal reflux    • RAD (reactive airway disease)     allergy to cats or excercised induced asthma     Past Surgical History:   Procedure Laterality Date   • Breast surgery  2005    left lumpectomy , surgery, radiation, chemo   • Echo stress  8/8/2013    Abnormal EKG   • Gynecologic cryosurgery  fall 2005    bilateral oophorectomy   • Gynecologic cryosurgery      several laparoscopy for endometriosis   • Holter 24-48hrs dr interpret wearable  8/7-8/8/2013     Social History     Socioeconomic History   • Marital status: /Civil Union     Spouse name: None   • Number of children: None   • Years of education: None   • Highest education level: None   Social Needs   • Financial resource strain: None   • Food insecurity - worry: None   • Food insecurity - inability: None   • Transportation needs - medical: None   • Transportation needs - non-medical: None   Occupational History   • None   Tobacco Use   • Smoking status: Former Smoker     Types: Cigarettes   • Smokeless tobacco: Never Used   • Tobacco comment: Quit 1991   Substance and Sexual Activity   • Alcohol use: Yes     Alcohol/week: 0.6 oz     Types: 1 Glasses of wine per  week   • Drug use: No   • Sexual activity: None   Other Topics Concern   • None   Social History Narrative   • None     Family History   Problem Relation Age of Onset   • Diabetes Mother    • Hypertension Mother    • Asthma Sister    • Diabetes Sister    • Cancer Maternal Aunt 80        Bladder   • Diabetes Maternal Grandmother    • Cancer Maternal Grandfather 70        Bladder   • Cancer Paternal Grandmother         Colon         REVIEW OF SYSTEMS:    All other systems are reviewed and are negative except as documented in the history of present illness.    PHYSICAL EXAM:    Vital Signs: Blood pressure 123/74, pulse 75, resp. rate 14, height 5' 7\" (1.702 m), weight 64.5 kg.  General: The patient is well developed, well nourished, in no acute distress, appears stated age.   Head: Normocephalic.  Eyes: Normal conjunctivae and sclerae.  Pupils equal, round.  Extraocular movements intact.  HEENT: Oropharynx clear, moist mucous membranes, external ear and nose are normal to inspection.  Neck: Symmetric without swelling or tenderness. No mass palpated,  Respiratory: Respiratory effort normal.   Clear to auscultation bilaterally.  Cardiovascular: Regular rate and rhythm.     Extremities: No swelling or tenderness.  No edema.  Gastrointestinal:  Soft and nontender.  Normal bowel sounds.  No hepatomegaly or splenomegaly.  No guarding or rebound.  RECTAL: Deferred by the patient.  LYMPHATIC: No cervical or supraclavicular lymphadenopathy .  Neurologic/Psychiatric: Alert. Normal mood and affect, normal speech, no gross tremor. Oriented to time, place and person. No asterixis.  Skin: Warm and dry, normal turgor.    LABORATORY DATA:    Lab Results   Component Value Date    WBC 6.4 09/27/2018    HGB 13.2 09/27/2018    HCT 40.8 09/27/2018     09/27/2018    MCV 92.5 09/27/2018    and   Lab Results   Component Value Date    SODIUM 142 09/27/2018    POTASSIUM 4.2 09/27/2018    BUN 21 (H) 09/27/2018    CREATININE 0.88  09/27/2018    CALCIUM 9.3 09/27/2018    ALBUMIN 4.2 09/27/2018    BILIRUBIN 0.6 09/27/2018    ALKPT 58 09/27/2018    AST 16 09/27/2018    GPT 28 09/27/2018       ASSESSMENT:    R10.13 Epigastric abdominal pain  (primary encounter diagnosis)  Z80.0 Family history of colon cancer  Z86.010 Personal history of colonic polyps    PLAN:   Procedure: Colonoscopy (58563) with SuPrep and EGD (90665)  Diagnosis:   R10.13 Epigastric abdominal pain  (primary encounter diagnosis)  Z80.0 Family history of colon cancer  Z86.010 Personal history of colonic polyps  Tentative Date: Routine (next available or patient preference)  Tentative Time: To be determined  Duration of Procedure: 45 min  Anesthesia: No sedation      Instructions provided as documented in the after visit summary.  The patient indicated understanding of the diagnosis and agreed with the plan of care.     A copy of this note was sent to the referring provider.   Thank you for the kind referral and the opportunity to care for this patient.    COPY TO:  Antonette Neri DO    SIGNED:  Marie Leon MD  2/27/2019 3:30 PM

## 2021-03-03 NOTE — PROGRESS NOTES
Progress Note - OB/GYN   Nieves Perdue 21 y o  female MRN: [id number]  Unit/Bed#: L&D 308-01 Encounter: [id number]    Assessment:  Post partum Day #2 s/p , stable, baby in room    Plan:  1) ABLA   - QBL 1234cc   - Hgb 8 9 --> 8 4 --> 7 3 --> 6 4 --> 2u pRBCs --> 8 6   - S/p venofer, 2u pRBCs   - Tachycardia improved   - Second dose of Venofer today  2) Depression   - Continue zoloft  3) Continue routine post partum care   Encourage ambulation   Encourage breastfeeding   Anticipate discharge PPD#2     Subjective/Objective   Chief Complaint:     Post delivery  Patient is doing well  Lochia WNL  Pain well controlled  She states she is still tired but the feeling of tingling/pins and needles in her hands has resolved  She denies dizziness/lightheadeness and presyncopal episodes  Ambulating without difficulty  Subjective:     Pain: yes, cramping, improved with meds  Tolerating PO: yes  Voiding: yes  Flatus: yes  BM: no  Ambulating: yes  Chest pain: no  Shortness of breath: no  Leg pain: no  Lochia: minimal    Objective:     Vitals: /75 (BP Location: Left arm)   Pulse 93   Temp 97 6 °F (36 4 °C) (Temporal)   Resp 18   Ht 5' 5" (1 651 m)   Wt 90 3 kg (199 lb)   LMP 2020 (Exact Date)   SpO2 95%   Breastfeeding Yes   BMI 33 12 kg/m²       Intake/Output Summary (Last 24 hours) at 3/3/2021 0635  Last data filed at 3/2/2021 1130  Gross per 24 hour   Intake 606 67 ml   Output --   Net 606 67 ml       Lab Results   Component Value Date    WBC 14 99 (H) 2021    HGB 8 6 (L) 2021    HCT 27 8 (L) 2021    MCV 82 2021     2021       Physical Exam:     Gen: AAOx3, NAD  CV: RRR  Lungs: CTA b/l  Abd: Soft, non-tender, non-distended, no rebound or guarding  Uterine fundus firm and non-tender, 1 cm below the umbilicus     Ext: Non tender    Fish Watkins MD  3/3/2021  6:35 AM

## 2021-03-03 NOTE — PLAN OF CARE
Problem: PAIN - ADULT  Goal: Verbalizes/displays adequate comfort level or baseline comfort level  Description: Interventions:  - Encourage patient to monitor pain and request assistance  - Assess pain using appropriate pain scale  - Administer analgesics based on type and severity of pain and evaluate response  - Implement non-pharmacological measures as appropriate and evaluate response  - Consider cultural and social influences on pain and pain management  - Notify physician/advanced practitioner if interventions unsuccessful or patient reports new pain  3/2/2021 1942 by Zoraida Goddrad RN  Outcome: Progressing  3/2/2021 1942 by Zoraida Goddard RN  Outcome: Progressing     Problem: INFECTION - ADULT  Goal: Absence or prevention of progression during hospitalization  Description: INTERVENTIONS:  - Assess and monitor for signs and symptoms of infection  - Monitor lab/diagnostic results  - Monitor all insertion sites, i e  indwelling lines, tubes, and drains  - Monitor endotracheal if appropriate and nasal secretions for changes in amount and color  - [place] appropriate cooling/warming therapies per order  - Administer medications as ordered  - Instruct and encourage patient and family to use good hand hygiene technique  - Identify and instruct in appropriate isolation precautions for identified infection/condition  3/2/2021 1942 by Zoraida Goddard RN  Outcome: Progressing  3/2/2021 1942 by Zoraida Goddard RN  Outcome: Progressing     Problem: SAFETY ADULT  Goal: Patient will remain free of falls  Description: INTERVENTIONS:  - Assess patient frequently for physical needs  -  Identify cognitive and physical deficits and behaviors that affect risk of falls    -  [place] fall precautions as indicated by assessment   - Educate patient/family on patient safety including physical limitations  - Instruct patient to call for assistance with activity based on assessment  - Modify environment to reduce risk of injury  - Consider OT/PT consult to assist with strengthening/mobility  3/2/2021 1942 by Adrian Rizo RN  Outcome: Progressing  3/2/2021 1942 by Adrian Rizo RN  Outcome: Progressing

## 2021-03-04 LAB
ABO GROUP BLD BPU: NORMAL
ABO GROUP BLD BPU: NORMAL
BPU ID: NORMAL
BPU ID: NORMAL
CROSSMATCH: NORMAL
CROSSMATCH: NORMAL
UNIT DISPENSE STATUS: NORMAL
UNIT DISPENSE STATUS: NORMAL
UNIT PRODUCT CODE: NORMAL
UNIT PRODUCT CODE: NORMAL
UNIT RH: NORMAL
UNIT RH: NORMAL

## 2021-03-04 NOTE — UTILIZATION REVIEW
UPDATED INSURANCE      Notification of Maternity/Delivery & [facility] Birth Information for Admission   Notification of Maternity/Delivery for Admission to our facility [facility] [address]  Be advised that this patient was admitted to our facility under Inpatient Status  Contact Sepideh Leanne at [phone] for additional admission information  Sandra Thao PARENT/CHILD HEALTH UR DEPT  DEDICATED [phone]  Mother & [facility] Information   Patient Name: Marion Lynne YOB: 2000   Delivering clinician: [facility]   OB History        2    Para   1    Term   1       0    AB   1    Living   1       SAB   1    TAB   0    Ectopic   0    Multiple   0    Live Births   1               [facility] Name & MRN:   Information for the patient's :  Luis Dotson [[id number]]      Delivery Information:  Sex: male  Delivered 3/1/2021 3:07 PM by Vaginal, Spontaneous; Gestational Age: 43w3d    [facility] Measurements:  Weight: 7 lb 6 7 oz (3365 g); Height: 20 5"    APGAR 1 minute 5 minutes 10 minutes   Totals: 7 8       Birth Information: 21 y o  female MRN: [id number] Unit/Bed#: L&D 308-01 Estimated Date of Delivery: 3/4/21  Birthweight: No birth weight on file   Gestational Age: <None> Delivery Type: Vaginal, Spontaneous          APGARS  One minute Five minutes Ten minutes   Totals:                 [address]   [address]:   [address]  Tax ID: [id number]  NPI: [id number] Attending Provider/NPI:  Phone:  Address: Nirali Santos [[id number]]  [phone]  Same as Facility   Place of Service Code: 24 Place of Service Name: [address] [place] [address]   Start Date: 21   Discharge Date & Time: 3/3/2021 12:10 PM    Type of Admission: Inpatient Status Discharge Disposition (if discharged): Home/Self Care   Patient Diagnoses:   Encounter for induction of labor [Z34 90]  The primary encounter diagnosis was 39 weeks gestation of pregnancy  A diagnosis of  (spontaneous vaginal delivery) was also pertinent to this visit  1  39 weeks gestation of pregnancy    2   (spontaneous vaginal delivery)       Orders: Admission Orders (From admission, onward)     Ordered        21  INPATIENT ADMISSION  Once                    Assigned Utilization Review Contact: Garcia Pilling  Utilization   Network Utilization Review Department  Phone: [phone]; Fax [phone]  Email: [email or web address]  [facility]

## 2021-03-05 ENCOUNTER — HOSPITAL ENCOUNTER (EMERGENCY)
Facility: HOSPITAL | Age: 21
Discharge: HOME | End: 2021-03-05
Attending: EMERGENCY MEDICINE | Admitting: EMERGENCY MEDICINE
Payer: COMMERCIAL

## 2021-03-05 VITALS
RESPIRATION RATE: 17 BRPM | DIASTOLIC BLOOD PRESSURE: 72 MMHG | TEMPERATURE: 98.5 F | HEART RATE: 78 BPM | SYSTOLIC BLOOD PRESSURE: 122 MMHG | BODY MASS INDEX: 33.27 KG/M2 | OXYGEN SATURATION: 99 % | WEIGHT: 199.96 LBS

## 2021-03-05 DIAGNOSIS — D64.9 ANEMIA: ICD-10-CM

## 2021-03-05 LAB
ABO GROUP BLD: NORMAL
ALBUMIN SERPL BCP-MCNC: 2.4 G/DL (ref 3.5–5)
ALP SERPL-CCNC: 119 U/L (ref 46–116)
ALT SERPL W P-5'-P-CCNC: 28 U/L (ref 12–78)
ANION GAP SERPL CALCULATED.3IONS-SCNC: 8 MMOL/L (ref 4–13)
APTT PPP: 32 SECONDS (ref 23–37)
AST SERPL W P-5'-P-CCNC: 21 U/L (ref 5–45)
BASOPHILS # BLD AUTO: 0.03 THOUSANDS/ΜL (ref 0–0.1)
BASOPHILS NFR BLD AUTO: 0 % (ref 0–1)
BILIRUB SERPL-MCNC: 0.24 MG/DL (ref 0.2–1)
BUN SERPL-MCNC: 4 MG/DL (ref 5–25)
CALCIUM ALBUM COR SERPL-MCNC: 9.6 MG/DL (ref 8.3–10.1)
CALCIUM SERPL-MCNC: 8.3 MG/DL (ref 8.3–10.1)
CHLORIDE SERPL-SCNC: 105 MMOL/L (ref 100–108)
CO2 SERPL-SCNC: 26 MMOL/L (ref 21–32)
CREAT SERPL-MCNC: 0.62 MG/DL (ref 0.6–1.3)
EOSINOPHIL # BLD AUTO: 0.34 THOUSAND/ΜL (ref 0–0.61)
EOSINOPHIL NFR BLD AUTO: 3 % (ref 0–6)
ERYTHROCYTE [DISTWIDTH] IN BLOOD BY AUTOMATED COUNT: 15.4 % (ref 11.6–15.1)
GFR SERPL CREATININE-BSD FRML MDRD: 130 ML/MIN/1.73SQ M
GLUCOSE SERPL-MCNC: 75 MG/DL (ref 65–140)
HCT VFR BLD AUTO: 30.8 % (ref 34.8–46.1)
HGB BLD-MCNC: 9.5 G/DL (ref 11.5–15.4)
IMM GRANULOCYTES # BLD AUTO: 0.11 THOUSAND/UL (ref 0–0.2)
IMM GRANULOCYTES NFR BLD AUTO: 1 % (ref 0–2)
INR PPP: 1.16 (ref 0.84–1.19)
LYMPHOCYTES # BLD AUTO: 1.38 THOUSANDS/ΜL (ref 0.6–4.47)
LYMPHOCYTES NFR BLD AUTO: 13 % (ref 14–44)
MCH RBC QN AUTO: 25.8 PG (ref 26.8–34.3)
MCHC RBC AUTO-ENTMCNC: 30.8 G/DL (ref 31.4–37.4)
MCV RBC AUTO: 84 FL (ref 82–98)
MONOCYTES # BLD AUTO: 0.58 THOUSAND/ΜL (ref 0.17–1.22)
MONOCYTES NFR BLD AUTO: 5 % (ref 4–12)
NEUTROPHILS # BLD AUTO: 8.33 THOUSANDS/ΜL (ref 1.85–7.62)
NEUTS SEG NFR BLD AUTO: 78 % (ref 43–75)
NRBC BLD AUTO-RTO: 0 /100 WBCS
PLATELET # BLD AUTO: 218 THOUSANDS/UL (ref 149–390)
PMV BLD AUTO: 9.4 FL (ref 8.9–12.7)
POTASSIUM SERPL-SCNC: 4 MMOL/L (ref 3.5–5.3)
PROT SERPL-MCNC: 5.7 G/DL (ref 6.4–8.2)
PROTHROMBIN TIME: 14.6 SECONDS (ref 11.6–14.5)
RBC # BLD AUTO: 3.68 MILLION/UL (ref 3.81–5.12)
RH BLD: POSITIVE
SODIUM SERPL-SCNC: 139 MMOL/L (ref 136–145)
SPECIMEN EXPIRATION DATE: NORMAL
WBC # BLD AUTO: 10.77 THOUSAND/UL (ref 4.31–10.16)

## 2021-03-05 PROCEDURE — 80053 COMPREHEN METABOLIC PANEL: CPT | Performed by: PHYSICIAN ASSISTANT

## 2021-03-05 PROCEDURE — NC001 PR NO CHARGE: Performed by: OBSTETRICS & GYNECOLOGY

## 2021-03-05 PROCEDURE — 96360 HYDRATION IV INFUSION INIT: CPT

## 2021-03-05 PROCEDURE — 85610 PROTHROMBIN TIME: CPT | Performed by: PHYSICIAN ASSISTANT

## 2021-03-05 PROCEDURE — 86850 RBC ANTIBODY SCREEN: CPT | Performed by: PHYSICIAN ASSISTANT

## 2021-03-05 PROCEDURE — 99284 EMERGENCY DEPT VISIT MOD MDM: CPT | Performed by: PHYSICIAN ASSISTANT

## 2021-03-05 PROCEDURE — 86900 BLOOD TYPING SEROLOGIC ABO: CPT | Performed by: PHYSICIAN ASSISTANT

## 2021-03-05 PROCEDURE — 36415 COLL VENOUS BLD VENIPUNCTURE: CPT | Performed by: PHYSICIAN ASSISTANT

## 2021-03-05 PROCEDURE — 85730 THROMBOPLASTIN TIME PARTIAL: CPT | Performed by: PHYSICIAN ASSISTANT

## 2021-03-05 PROCEDURE — 86901 BLOOD TYPING SEROLOGIC RH(D): CPT | Performed by: PHYSICIAN ASSISTANT

## 2021-03-05 PROCEDURE — 99284 EMERGENCY DEPT VISIT MOD MDM: CPT

## 2021-03-05 PROCEDURE — 85025 COMPLETE CBC W/AUTO DIFF WBC: CPT | Performed by: PHYSICIAN ASSISTANT

## 2021-03-05 RX ORDER — IBUPROFEN 600 MG/1
600 TABLET ORAL ONCE
Status: COMPLETED | OUTPATIENT
Start: 2021-03-05 | End: 2021-03-05

## 2021-03-05 RX ADMIN — IBUPROFEN 600 MG: 600 TABLET ORAL at 14:34

## 2021-03-05 RX ADMIN — SODIUM CHLORIDE 1000 ML: 0.9 INJECTION, SOLUTION INTRAVENOUS at 13:31

## 2021-03-05 NOTE — ED PROVIDER NOTES
History  Chief Complaint   Patient presents with    Vaginal Bleeding     gave bith on mondy passing golf ball size clots c/o feelinng dizzy and light headaed also c/o headache     Patient is a 77-year-old female  currently 4 days postpartum who presents with vaginal bleeding, and lightheadedness since yesterday  Patient had a uncomplicated pregnancy and gave birth to a 44 week gestation infant on 3/1  Patient did require a transfusion of 2 units PRBCs on 3/2 for anemia and was discharged on   Patient has a history of anemia and was taking iron during her pregnancy, with persistently low hemoglobin levels  Patient considered iron transfusions, but given how close to full gestation she was, doctors recommended against and holding off until after pregnancy  Patient denies any need for transfusion prior to 2 days ago  Patient today presents with "normal" vaginal bleeding since discharge from the hospital that she describes a light and variable, but states she did pass 2-3 large golf ball size clots from her vagina last night and has had intermittent lightheadedness  She denies any associated headache, vision changes, neck pain or stiffness, numbness, tingling, weakness, nausea, vomiting, chest pain, shortness of breath, palpitations  Patient is breastfeeding and states she has been nervous and not sleeping well since returning home  Patient otherwise is eating and drinking well  Patient notes mild intermittent cramping abdominal pain, which is unchanged since the delivery  Patient denies any nausea, vomiting, constipation, diarrhea, dysuria, hematuria, urinary frequency or urgency, fevers, chills, diaphoresis, known sick contacts  Prior to Admission Medications   Prescriptions Last Dose Informant Patient Reported? Taking?    Ferrex 150 150 MG capsule   No No   Sig: TAKE 1 CAPSULE BY MOUTH TWICE A DAY   Ferrous Sulfate (SLOW FE PO) 3/5/2021 at Unknown time Self Yes Yes   Sig: Take 325 mg by mouth daily   Prenatal Multivit-Min-Fe-FA (PRENATAL 1 + IRON PO) 3/5/2021 at Unknown time Self Yes Yes   Sig: Take by mouth daily   acetaminophen (TYLENOL) 325 mg tablet   No No   Sig: Take 2 tablets (650 mg total) by mouth every 6 (six) hours   albuterol (PROVENTIL HFA,VENTOLIN HFA) 90 mcg/act inhaler  Self Yes No   Sig: Inhale 2 puffs as needed    docusate sodium (COLACE) 100 mg capsule 3/5/2021 at Unknown time  No Yes   Sig: Take 1 capsule (100 mg total) by mouth 2 (two) times a day   ibuprofen (MOTRIN) 600 mg tablet   No No   Sig: Take 1 tablet (600 mg total) by mouth every 6 (six) hours   pantoprazole (PROTONIX) 40 mg tablet 3/5/2021 at Unknown time Self No Yes   Sig: Take 1 tablet (40 mg total) by mouth 2 (two) times a day   sertraline (ZOLOFT) 50 mg tablet 3/5/2021 at Unknown time Self Yes Yes   Sig: Take 50 mg by mouth daily   sucralfate (CARAFATE) 1 g tablet 3/5/2021 at Unknown time Self No Yes   Sig: Take 1 tablet (1 g total) by mouth 2 (two) times a day      Facility-Administered Medications: None       Past Medical History:   Diagnosis Date    Abnormal ECG     no diagnosis    Anemia     Anxiety     Asthma     Depression     Endometriosis     Frequent sinus infections     GERD (gastroesophageal reflux disease)     Migraine     Syncope     Trauma     Varicella     vaccine       Past Surgical History:   Procedure Laterality Date    TONSILLECTOMY  2006    UPPER GASTROINTESTINAL ENDOSCOPY  05/18/2017    Dysphagia, dilated to [facility]   UPPER GASTROINTESTINAL ENDOSCOPY  10/07/2016    Dysphagia, dilated to [facility]  Biopsies negative for eosinophilic esophagitis    UPPER GASTROINTESTINAL ENDOSCOPY  02/22/2018    Dysphagia, dilated to [facility]    Biopsies negative for eosinophilic esophagitis    WISDOM TOOTH EXTRACTION         Family History   Problem Relation Age of Onset    Hypertension Mother    [place] Arthritis Mother     Colon polyps Mother     Hyperlipidemia Father     Hypertension Maternal Grandfather     Stroke Maternal Grandfather     Myasthenia gravis Maternal Grandfather     Diabetes Paternal Grandfather     Stroke Paternal Grandfather     Cancer Paternal Grandfather     Bipolar disorder Sister     Suicide Attempts Sister     Mental illness Other     Colon cancer Other     Thyroid disease Maternal Grandmother     Colon polyps Maternal Grandmother     Bipolar disorder Paternal Aunt     Drug abuse Paternal Aunt     Eating disorder Paternal [de-identified]     Lung cancer Maternal Uncle     Substance Abuse Neg Hx      I have reviewed and agree with the history as documented  E-Cigarette/Vaping    E-Cigarette Use Never User      E-Cigarette/Vaping Substances    Nicotine No     THC No     CBD No     Flavoring No     Other No     Unknown No      Social History     Tobacco Use    Smoking status: Never Smoker    Smokeless tobacco: Never Used   Substance Use Topics    Alcohol use: Not Currently     Comment: social    Drug use: Not Currently     Types: Marijuana     Comment: last time a year ago        Review of Systems   Constitutional: Negative for chills, diaphoresis and fever  HENT: Negative for congestion and sore throat  Eyes: Negative for visual disturbance  Respiratory: Negative for cough, shortness of breath, wheezing and stridor  Cardiovascular: Negative for chest pain, palpitations and leg swelling  Gastrointestinal: Positive for abdominal pain (unchanged from post-delivery)  Negative for diarrhea, nausea and vomiting  Genitourinary: Positive for vaginal bleeding  Negative for difficulty urinating, dysuria, frequency, hematuria and urgency  Musculoskeletal: Negative for myalgias, neck pain and neck stiffness  Skin: Negative for color change, pallor and rash  Neurological: Positive for light-headedness  Negative for dizziness, weakness, numbness and headaches  All other systems reviewed and are negative        Physical Exam  Physical Exam  Vitals signs and nursing note reviewed  Constitutional:       General: She is awake  She is not in acute distress  Appearance: She is well-developed  She is not ill-appearing, toxic-appearing or diaphoretic  HENT:      Head: Normocephalic and atraumatic  Right Ear: External ear normal       Left Ear: External ear normal       Nose: Nose normal    Eyes:      Extraocular Movements: Extraocular movements intact  Right eye: No nystagmus  Left eye: No nystagmus  Conjunctiva/sclera: Conjunctivae normal       Pupils: Pupils are equal, round, and reactive to light  Neck:      Musculoskeletal: Normal range of motion and neck supple  Cardiovascular:      Rate and Rhythm: Normal rate and regular rhythm  Pulses: Normal pulses  Heart sounds: Normal heart sounds, S1 normal and S2 normal    Pulmonary:      Effort: Pulmonary effort is normal  No respiratory distress  Breath sounds: Normal breath sounds  No stridor  No decreased breath sounds, wheezing, rhonchi or rales  Abdominal:      General: Bowel sounds are normal  There is no distension  Palpations: Abdomen is soft  Tenderness: There is no abdominal tenderness  Musculoskeletal: Normal range of motion  Comments: Neurovascularly intact, 5/5 strength in bilateral upper and lower extremities   Skin:     General: Skin is warm and dry  Capillary Refill: Capillary refill takes less than 2 seconds  Neurological:      General: No focal deficit present  Mental Status: She is alert and oriented to person, place, and time  GCS: GCS eye subscore is 4  GCS verbal subscore is 5  GCS motor subscore is 6  Cranial Nerves: Cranial nerves are intact  Sensory: Sensation is intact  Motor: Motor function is intact  Coordination: Coordination is intact  Psychiatric:         Behavior: Behavior is cooperative           Vital Signs  ED Triage Vitals [03/05/21 1220]   Temperature Pulse Respirations Blood Pressure SpO2   98 5 °F (36 9 °C) 88 18 126/71 98 %      Temp Source Heart Rate Source Patient Position - Orthostatic VS BP Location FiO2 (%)   Oral Monitor Sitting Right arm --      Pain Score       4           Vitals:    03/05/21 1220 03/05/21 1436   BP: 126/71 122/72   Pulse: 88 78   Patient Position - Orthostatic VS: Sitting Lying         Visual Acuity      ED Medications  Medications   sodium chloride 0 9 % bolus 1,000 mL (0 mL Intravenous Stopped 3/5/21 1435)   ibuprofen (MOTRIN) tablet 600 mg (600 mg Oral Given 3/5/21 1434)       Diagnostic Studies  Results Reviewed     Procedure Component Value Units Date/Time    Comprehensive metabolic panel [[id number]]  (Abnormal) Collected: 03/05/21 1328    Lab Status: Final result Specimen: Blood from Arm, Left Updated: 03/05/21 1401     Sodium 139 mmol/L      Potassium 4 0 mmol/L      Chloride 105 mmol/L      CO2 26 mmol/L      ANION GAP 8 mmol/L      BUN 4 mg/dL      Creatinine 0 62 mg/dL      Glucose 75 mg/dL      Calcium 8 3 mg/dL      Corrected Calcium 9 6 mg/dL      AST 21 U/L      ALT 28 U/L      Alkaline Phosphatase 119 U/L      Total Protein 5 7 g/dL      Albumin 2 4 g/dL      Total Bilirubin 0 24 mg/dL      eGFR 130 ml/min/1 73sq m     Narrative:      [place] guidelines for Chronic Kidney Disease (CKD):     Stage 1 with normal or high GFR (GFR > 90 mL/min/1 73 square meters)    Stage 2 Mild CKD (GFR = 60-89 mL/min/1 73 square meters)    Stage 3A Moderate CKD (GFR = 45-59 mL/min/1 73 square meters)    Stage 3B Moderate CKD (GFR = 30-44 mL/min/1 73 square meters)    Stage 4 Severe CKD (GFR = 15-29 mL/min/1 73 square meters)    Stage 5 End Stage CKD (GFR <15 mL/min/1 73 square meters)  Note: GFR calculation is accurate only with a steady state creatinine    Protime-INR [[id number]]  (Abnormal) Collected: 03/05/21 1328    Lab Status: Final result Specimen: Blood from Arm, Left Updated: 03/05/21 1350     Protime 14 6 seconds      INR 1  16    APTT [[id number]]  (Normal) Collected: 03/05/21 1328    Lab Status: Final result Specimen: Blood from Arm, Left Updated: 03/05/21 1350     PTT 32 seconds     CBC and differential [[id number]]  (Abnormal) Collected: 03/05/21 1328    Lab Status: Final result Specimen: Blood from Arm, Left Updated: 03/05/21 1335     WBC 10 77 Thousand/uL      RBC 3 68 Million/uL      Hemoglobin 9 5 g/dL      Hematocrit 30 8 %      MCV 84 fL      MCH 25 8 pg      MCHC 30 8 g/dL      RDW 15 4 %      MPV 9 4 fL      Platelets 179 Thousands/uL      nRBC 0 /100 WBCs      Neutrophils Relative 78 %      Immat GRANS % 1 %      Lymphocytes Relative 13 %      Monocytes Relative 5 %      Eosinophils Relative 3 %      Basophils Relative 0 %      Neutrophils Absolute 8 33 Thousands/µL      Immature Grans Absolute 0 11 Thousand/uL      Lymphocytes Absolute 1 38 Thousands/µL      Monocytes Absolute 0 58 Thousand/µL      Eosinophils Absolute 0 34 Thousand/µL      Basophils Absolute 0 03 Thousands/µL                  No orders to display              Procedures  Procedures         ED Course  ED Course as of Mar 05 1615   Fri Mar 05, 2021   1315 Initially, patient might have been private for OB, but OB states patient must first be evaluated by ED staff  I will evaluate and contact OB again      1410 Improved from 3 days ago, 8 6   Hemoglobin(!): 9 5   1420 OBGYN has seen and evaluated patient  Patient is passing normal lochia, no acute findings on their exam  Patient's hgb is stable  Sleep deprivation is believed to be playing a factor in her symptoms as well  Patient can be discharged with follow-up with outpatient follow-up next week  1426 Patient notes improvement of her symptoms and states she is feeling much better  Especially knowing that everything looks good   Patient is having some vaginal pain from OB  exam  OB recommends 600mg ibuprofen, which can be given prior to d/c                                              MDM  Number of Diagnoses or Management Options  Anemia:   Postpartum bleeding:   Diagnosis management comments: Reviewed all results with patient, answered questions  Patient's vitals and hemoglobin are stable  Patient's symptoms improved  Reviewed treatment at home  OB provided education regarding postpartum care  Recommended follow-up with OB next week as instructed  Recommended follow-up with PCP as needed  The management plan was discussed in detail with the patient at bedside and all questions were answered  Provided both verbal and written instructions  Reviewed red flag symptoms and strict return to ED instructions  Patient notes understanding and agrees to plan  Disposition  Final diagnoses:   Postpartum bleeding   Anemia - Stable     Time reflects when diagnosis was documented in both MDM as applicable and the Disposition within this note     Time User Action Codes Description Comment    3/5/2021  2:20 PM Kathrynn Comfort Add [O72 1] Postpartum bleeding     3/5/2021  2:20 PM Kathrynn Comfort Add [D64 9] Anemia     3/5/2021  2:20 PM Kathrynn Comfort Modify [D64 9] Anemia Stable      ED Disposition     ED Disposition Condition Date/Time Comment    Discharge Stable Fri Mar 5, 2021  2:20 PM Marion Lynne discharge to home/self care  Follow-up Information     Follow up With Specialties Details Why [facility] Schedule an appointment as soon as possible for a visit The office will call you next week to make a follow up appointment  If they do not call, please call the office to schedule   [address] [facility] Emergency Medicine  If symptoms worsen [place] [id number]  [address] [place] [address] Emergency Department, [address] [place] [address], [address] [facility], DO Family Medicine  As needed [address]  [phone]             Discharge Medication List as of 3/5/2021  2:29 PM      CONTINUE these medications which have NOT CHANGED    Details   docusate sodium (COLACE) 100 mg capsule Take 1 capsule (100 mg total) by mouth 2 (two) times a day, Starting Wed 3/3/2021, No Print      Ferrous Sulfate (SLOW FE PO) Take 325 mg by mouth daily, Historical Med      pantoprazole (PROTONIX) 40 mg tablet Take 1 tablet (40 mg total) by mouth 2 (two) times a day, Starting Wed 7/29/2020, Print      Prenatal Multivit-Min-Fe-FA (PRENATAL 1 + IRON PO) Take by mouth daily, Historical Med      sertraline (ZOLOFT) 50 mg tablet Take 50 mg by mouth daily, Starting Thu 8/6/2020, Historical Med      sucralfate (CARAFATE) 1 g tablet Take 1 tablet (1 g total) by mouth 2 (two) times a day, Starting Wed 7/29/2020, Print      acetaminophen (TYLENOL) 325 mg tablet Take 2 tablets (650 mg total) by mouth every 6 (six) hours, Starting Wed 3/3/2021, No Print      albuterol (PROVENTIL HFA,VENTOLIN HFA) 90 mcg/act inhaler Inhale 2 puffs as needed , Historical Med      Ferrex 150 150 MG capsule TAKE 1 CAPSULE BY MOUTH TWICE A DAY, Normal      ibuprofen (MOTRIN) 600 mg tablet Take 1 tablet (600 mg total) by mouth every 6 (six) hours, Starting Wed 3/3/2021, No Print           No discharge procedures on file      PDMP Review       Value Time User    PDMP Reviewed  Yes 7/11/2020  5:11 PM Farzana Holman DO          ED Provider  Electronically Signed by           Samia Trujillo PA-C  03/05/21 [facility]  03/05/21 9904

## 2021-03-05 NOTE — DISCHARGE INSTRUCTIONS
Postpartum Bleeding   AMBULATORY CARE:   What you need to know about postpartum bleeding:  Postpartum bleeding is vaginal bleeding after childbirth  This bleeding is normal, whether your baby was born vaginally or by   It contains blood and the tissue that lined the inside of your uterus when you were pregnant  What to expect with postpartum bleeding:  Postpartum bleeding usually lasts at least 10 days, and may last longer than 6 weeks  Your bleeding may range from light (barely staining a pad) to heavy (soaking a pad in 1 hour)  Usually, you have heavier bleeding right after childbirth, which slows over the next few weeks until it stops  The bleeding is red or dark brown with clots for the first 1 to 3 days  It then turns pink for several days, and then becomes a white or yellow discharge until it ends  Call 911 for any of the following:   · You are suddenly short of breath and feel lightheaded  · You have sudden chest pain  Seek care immediately if:   · You are breathing faster than normal     · Your heart is beating faster than normal     Contact your healthcare provider if:   · Your bleeding increases, or you have heavy bleeding that soaks a pad in 1 hour for 2 hours in a row  · You pass large blood clots  · You feel dizzy  · You have a low back ache, abdominal pain or tenderness, or loss of appetite  · You are urinating less than usual, or not at all  · You have questions or concerns about your condition or care  What to expect with postpartum bleeding:  Postpartum bleeding usually lasts at least 10 days, and may last longer than 6 weeks  Your bleeding may range from light (barely staining a pad) to heavy (soaking a pad in 1 hour)  Usually, you have heavier bleeding right after childbirth, which slows over the next few weeks until it stops  The bleeding is red or dark brown with clots for the first 1 to 3 days   It then turns pink for several days, and then becomes a white or yellow discharge until it ends  Follow up with your obstetrician as directed:  Do not have sex until your obstetrician says it is okay  Write down your questions so you remember to ask them during your visits  © Copyright [address] Information is for End User's use only and may not be sold, redistributed or otherwise used for commercial purposes  All illustrations and images included in CareNotes® are the copyrighted property of A D A M , Inc  or [facility] Jay Helm   The above information is an  only  It is not intended as medical advice for individual conditions or treatments  Talk to your doctor, nurse or pharmacist before following any medical regimen to see if it is safe and effective for you

## 2021-03-05 NOTE — CONSULTS
Consultation - Obstetrics/Gynecology  Marion Lynne 21 y o  female MRN: [id number]  Unit/Bed#: ED 17 Encounter: [id number]      Inpatient consult to Obstetrics / Gynecology  Consult performed by: Court Garcia MD  Consult ordered by: Rachel De La Cruz PA-C            Chief Complaint   Patient presents with    Vaginal Bleeding     gave bith on mondy passing golf ball size clots c/o feelinng dizzy and light headaed also c/o headache       History of Present Illness   Physician Requesting Consult: Dayday Gonzalez MD  Reason for Consult / Principal Problem: postpartum bleeding   Subspeciality: General GYN  HPI: Marion Lynne is a 21 y o  female who presents with vaginal bleeding and dizziness  Pt is PPD#4 from  complicated by 2nd degree laceration and hemorrhage  Her Hgb during admission was noted to drop to 6 4 after delivery, and she received 2u pRBCs  Her hgb then responded to 8 6 by the time of discharge  She was discharged on 3/3/21  Since being home she has felt okay  She has not slept much at all the last few nights and thinks this has caused dizziness/headache  Her postpartum lochia had been normal until last night, when she passed two "golf ball-sized blood clots " She has noticed some discharge as well  She denies fever, chills, nausea, vomiting  She has not had LOC  She denies decrease in urination  She is tolerating PO intake  She continues to breastfeed successfully  Has noticed increase in milk production  Review of Systems   Constitutional: Negative for appetite change, chills and fever  Eyes: Negative for visual disturbance  Respiratory: Negative for cough and shortness of breath  Cardiovascular: Negative for chest pain and palpitations  Gastrointestinal: Negative for abdominal pain, blood in stool, nausea and vomiting  Genitourinary: Positive for vaginal bleeding, vaginal discharge and vaginal pain  Negative for decreased urine volume, dysuria and pelvic pain     Skin: Negative for color change and pallor  Neurological: Positive for dizziness and headaches (from lack of sleep)  Negative for weakness  Psychiatric/Behavioral: Negative for behavioral problems and confusion  Historical Information   Past Medical History:   Diagnosis Date    Abnormal ECG     no diagnosis    Anemia     Anxiety     Asthma     Depression     Endometriosis     Frequent sinus infections     GERD (gastroesophageal reflux disease)     Migraine     Syncope     Trauma     Varicella     vaccine     Past Surgical History:   Procedure Laterality Date    TONSILLECTOMY  2006    UPPER GASTROINTESTINAL ENDOSCOPY  2017    Dysphagia, dilated to 47 [facility]   UPPER GASTROINTESTINAL ENDOSCOPY  10/07/2016    Dysphagia, dilated to 47 [facility]  Biopsies negative for eosinophilic esophagitis    UPPER GASTROINTESTINAL ENDOSCOPY  2018    Dysphagia, dilated to 64 [facility]    Biopsies negative for eosinophilic esophagitis    WISDOM TOOTH EXTRACTION       OB History    Para Term  AB Living   2 1 1 0 1 1   SAB TAB Ectopic Multiple Live Births   1 0 0 0 1      # Outcome Date GA Lbr Lino/2nd Weight Sex Delivery Anes PTL Lv   2 Term 21 39w4d / 00:52 3365 g (7 lb 6 7 oz) M Vag-Spont EPI N SEN   1 SAB              Family History   Problem Relation Age of Onset    Hypertension Mother    [de-identified] Arthritis Mother     Colon polyps Mother     Hyperlipidemia Father     Hypertension Maternal Grandfather     Stroke Maternal Grandfather     Myasthenia gravis Maternal Grandfather     Diabetes Paternal Grandfather     Stroke Paternal Grandfather     Cancer Paternal Grandfather     Bipolar disorder Sister     Suicide Attempts Sister     Mental illness Other     Colon cancer Other     Thyroid disease Maternal Grandmother     Colon polyps Maternal Grandmother     Bipolar disorder Paternal Aunt     Drug abuse Paternal Aunt     Eating disorder Paternal Aunt     Lung cancer Maternal Uncle     Substance Abuse Neg Hx      Social History   Social History     Substance and Sexual Activity   Alcohol Use Not Currently    Comment: social     Social History     Substance and Sexual Activity   Drug Use Not Currently    Types: Marijuana    Comment: last time a year ago      Social History     Tobacco Use   Smoking Status Never Smoker   Smokeless Tobacco Never Used       Meds/Allergies   Current Facility-Administered Medications   Medication Dose Route Frequency    sodium chloride 0 9 % bolus 1,000 mL  1,000 mL Intravenous Once         Allergies   Allergen Reactions    Strawberry C [Ascorbate] Anaphylaxis    Mold Extract [Trichophyton]     Cat Hair Extract        Objective   Vitals: Blood pressure 126/71, pulse 88, temperature 98 5 °F (36 9 °C), temperature source Oral, resp  rate 18, weight 90 7 kg (199 lb 15 3 oz), last menstrual period 05/28/2020, SpO2 98 %, currently breastfeeding  Body mass index is 33 27 kg/m²  No intake or output data in the 24 hours ending 03/05/21 1423    Invasive Devices     Peripheral Intravenous Line            Peripheral IV 03/05/21 Left Antecubital less than 1 day                Physical Exam  Constitutional:       Appearance: Normal appearance  She is normal weight  She is not ill-appearing or diaphoretic  HENT:      Head: Normocephalic and atraumatic  Mouth/Throat:      Mouth: Mucous membranes are moist    Eyes:      Conjunctiva/sclera: Conjunctivae normal    Cardiovascular:      Rate and Rhythm: Normal rate and regular rhythm  Pulmonary:      Effort: Pulmonary effort is normal  No respiratory distress  Abdominal:      General: Abdomen is flat  There is no distension  Palpations: Abdomen is soft  Tenderness: There is no abdominal tenderness  Genitourinary:     Comments: Normal appearing external genitalia  2nd degree laceration healing well  Sutures intact  Vaginal mucosa intact on palpation  Tenderness noted on insertion of speculum   Cervix normal in appearance, slightly dilated, which is expected postpartum  Moderate lochia noted in the vaginal vault  Small blood clot noted at the external cervical os  This was extracted using ringed forceps  No active bleeding noted  Neurological:      Mental Status: She is alert           Lab Results:   Recent Results (from the past 24 hour(s))   CBC and differential    Collection Time: 03/05/21  1:28 PM   Result Value Ref Range    WBC 10 77 (H) 4 31 - 10 16 Thousand/uL    RBC 3 68 (L) 3 81 - 5 12 Million/uL    Hemoglobin 9 5 (L) 11 5 - 15 4 g/dL    Hematocrit 30 8 (L) 34 8 - 46 1 %    MCV 84 82 - 98 fL    MCH 25 8 (L) 26 8 - 34 3 pg    MCHC 30 8 (L) 31 4 - 37 4 g/dL    RDW 15 4 (H) 11 6 - 15 1 %    MPV 9 4 8 9 - 12 7 fL    Platelets 476 828 - 666 Thousands/uL    nRBC 0 /100 WBCs    Neutrophils Relative 78 (H) 43 - 75 %    Immat GRANS % 1 0 - 2 %    Lymphocytes Relative 13 (L) 14 - 44 %    Monocytes Relative 5 4 - 12 %    Eosinophils Relative 3 0 - 6 %    Basophils Relative 0 0 - 1 %    Neutrophils Absolute 8 33 (H) 1 85 - 7 62 Thousands/µL    Immature Grans Absolute 0 11 0 00 - 0 20 Thousand/uL    Lymphocytes Absolute 1 38 0 60 - 4 47 Thousands/µL    Monocytes Absolute 0 58 0 17 - 1 22 Thousand/µL    Eosinophils Absolute 0 34 0 00 - 0 61 Thousand/µL    Basophils Absolute 0 03 0 00 - 0 10 Thousands/µL   Comprehensive metabolic panel    Collection Time: 03/05/21  1:28 PM   Result Value Ref Range    Sodium 139 136 - 145 mmol/L    Potassium 4 0 3 5 - 5 3 mmol/L    Chloride 105 100 - 108 mmol/L    CO2 26 21 - 32 mmol/L    ANION GAP 8 4 - 13 mmol/L    BUN 4 (L) 5 - 25 mg/dL    Creatinine 0 62 0 60 - 1 30 mg/dL    Glucose 75 65 - 140 mg/dL    Calcium 8 3 8 3 - 10 1 mg/dL    Corrected Calcium 9 6 8 3 - 10 1 mg/dL    AST 21 5 - 45 U/L    ALT 28 12 - 78 U/L    Alkaline Phosphatase 119 (H) 46 - 116 U/L    Total Protein 5 7 (L) 6 4 - 8 2 g/dL    Albumin 2 4 (L) 3 5 - 5 0 g/dL    Total Bilirubin 0 24 0 20 - 1 00 mg/dL    eGFR 130 ml/min/1 73sq m   Protime-INR    Collection Time: 21  1:28 PM   Result Value Ref Range    Protime 14 6 (H) 11 6 - 14 5 seconds    INR 1 16 0 84 - 1 19   APTT    Collection Time: 21  1:28 PM   Result Value Ref Range    PTT 32 23 - 37 seconds       Imaging:   none    EKG, Pathology, and Other Studies: I have personally reviewed pertinent reports  Assessment/Plan     Assessment:  Helayne Hammans is a 21 y o   female PPD#4 from  complicated by 2nd degree laceration and PPH, presenting with increased vaginal bleeding  Normal lochia noted on physical exam  Her vitals are stable and her Hgb is rising up appropriately since admission  Patient likely experiencing symptoms of dizziness due to lack of sleep the last few days  Plan:  - D/c home with precautions  - Reassured patient that she is having normal postpartum lochia and that her vitals are stable  - Encouraged follow up visit next week with [facility]ERNA [facility]  Their office was notified to schedule patient for a visit  Code Status: Prior    Counseling / Coordination of Care  Total floor / unit time spent today20 minutes  minutes  Greater than 50% of total time was spent with the patient and / or family counseling and / or coordination of care       Donald Quigley MD  3/5/2021  2:23 PM

## 2021-03-05 NOTE — ED NOTES
Pt states did call the OB provider was told to go to ER and they would be seeing her in the [address] Harjit Stein RN  03/05/21 0003

## 2021-03-09 LAB — PLACENTA IN STORAGE: NORMAL

## 2021-03-10 DIAGNOSIS — Z23 ENCOUNTER FOR IMMUNIZATION: ICD-10-CM

## 2021-03-12 ENCOUNTER — OFFICE VISIT (OUTPATIENT)
Dept: POSTPARTUM | Facility: CLINIC | Age: 21
End: 2021-03-12
Payer: COMMERCIAL

## 2021-03-12 ENCOUNTER — OFFICE VISIT (OUTPATIENT)
Dept: OBGYN CLINIC | Facility: CLINIC | Age: 21
End: 2021-03-12
Payer: COMMERCIAL

## 2021-03-12 VITALS — SYSTOLIC BLOOD PRESSURE: 118 MMHG | DIASTOLIC BLOOD PRESSURE: 74 MMHG

## 2021-03-12 DIAGNOSIS — D50.0 IRON DEFICIENCY ANEMIA DUE TO CHRONIC BLOOD LOSS: ICD-10-CM

## 2021-03-12 DIAGNOSIS — N39.0 URINARY TRACT INFECTION WITHOUT HEMATURIA, SITE UNSPECIFIED: ICD-10-CM

## 2021-03-12 DIAGNOSIS — B96.89 BV (BACTERIAL VAGINOSIS): ICD-10-CM

## 2021-03-12 DIAGNOSIS — N76.0 BV (BACTERIAL VAGINOSIS): ICD-10-CM

## 2021-03-12 LAB
SL AMB  POCT GLUCOSE, UA: ABNORMAL
SL AMB LEUKOCYTE ESTERASE,UA: 2
SL AMB POCT BILIRUBIN,UA: ABNORMAL
SL AMB POCT BLOOD,UA: ABNORMAL
SL AMB POCT CLARITY,UA: ABNORMAL
SL AMB POCT COLOR,UA: YELLOW
SL AMB POCT KETONES,UA: ABNORMAL
SL AMB POCT NITRITE,UA: ABNORMAL
SL AMB POCT PH,UA: 5
SL AMB POCT SPECIFIC GRAVITY,UA: 1
SL AMB POCT URINE PROTEIN: ABNORMAL
SL AMB POCT UROBILINOGEN: 0.2

## 2021-03-12 PROCEDURE — 99404 PREV MED CNSL INDIV APPRX 60: CPT | Performed by: PEDIATRICS

## 2021-03-12 PROCEDURE — 81002 URINALYSIS NONAUTO W/O SCOPE: CPT | Performed by: OBSTETRICS & GYNECOLOGY

## 2021-03-12 PROCEDURE — 99213 OFFICE O/P EST LOW 20 MIN: CPT | Performed by: OBSTETRICS & GYNECOLOGY

## 2021-03-12 RX ORDER — DOCUSATE SODIUM 100 MG/1
100 CAPSULE, LIQUID FILLED ORAL 2 TIMES DAILY PRN
Qty: 30 CAPSULE | Refills: 2
Start: 2021-03-12 | End: 2021-04-27 | Stop reason: ALTCHOICE

## 2021-03-12 RX ORDER — CLINDAMYCIN HYDROCHLORIDE 300 MG/1
300 CAPSULE ORAL 2 TIMES DAILY
Qty: 14 CAPSULE | Refills: 0 | Status: SHIPPED | OUTPATIENT
Start: 2021-03-12 | End: 2021-03-19

## 2021-03-12 NOTE — PROGRESS NOTES
INITIAL BREAST FEEDING EVALUATION    Informant/Relationship:Self    Discussion of General Lactation Issues: painful latches with a burning sensation on the nipples that lasts throughout the entire feeding  Infant is 6 old today          History:  Fertility Problem:no  Breast changes:yes - leaking  : yes - blood transfusion in postpartum - due to induction  Full term:yes - 39 weeks   labor:no  First nursing/attempt < 1 hour after birth:2 hours after birth   Skin to skin following delivery:yes - in labor room  Breast changes after delivery:yes - day 2 milk came; engorgement  Rooming in (infant in room with mother with exception of procedures, eg  Circumcision: yes - roomed in rest of time  Blood sugar issues:no  NICU stay:no  Jaundice:no  Phototherapy:no  Supplement given: (list supplement and method used as well as reason(s):no    Past Medical History:   Diagnosis Date    Abnormal ECG     no diagnosis    Anemia     Anxiety     Asthma     Depression     Endometriosis     Frequent sinus infections     GERD (gastroesophageal reflux disease)     Migraine     Syncope     Trauma     Varicella     vaccine         Current Outpatient Medications:     acetaminophen (TYLENOL) 325 mg tablet, Take 2 tablets (650 mg total) by mouth every 6 (six) hours, Disp: , Rfl: 0    albuterol (PROVENTIL HFA,VENTOLIN HFA) 90 mcg/act inhaler, Inhale 2 puffs as needed , Disp: , Rfl:     docusate sodium (COLACE) 100 mg capsule, Take 1 capsule (100 mg total) by mouth 2 (two) times a day, Disp: 10 capsule, Rfl: 0    Ferrex 150 150 MG capsule, TAKE 1 CAPSULE BY MOUTH TWICE A DAY, Disp: 180 capsule, Rfl: 0    Ferrous Sulfate (SLOW FE PO), Take 325 mg by mouth daily, Disp: , Rfl:     ibuprofen (MOTRIN) 600 mg tablet, Take 1 tablet (600 mg total) by mouth every 6 (six) hours, Disp: 30 tablet, Rfl: 0    pantoprazole (PROTONIX) 40 mg tablet, Take 1 tablet (40 mg total) by mouth 2 (two) times a day, Disp: 90 tablet, Rfl: 3    Prenatal Multivit-Min-Fe-FA (PRENATAL 1 + IRON PO), Take by mouth daily, Disp: , Rfl:     sertraline (ZOLOFT) 50 mg tablet, Take 50 mg by mouth daily, Disp: , Rfl:     sucralfate (CARAFATE) 1 g tablet, Take 1 tablet (1 g total) by mouth 2 (two) times a day, Disp: 180 tablet, Rfl: 3    Allergies   Allergen Reactions    Strawberry C [Ascorbate] Anaphylaxis    Mold Extract [Trichophyton]     Cat Hair Extract        Social History     Substance and Sexual Activity   Drug Use Not Currently    Types: Marijuana    Comment: last time a year ago        Social History     Interval Breastfeeding History:    Frequency of breast feeding: 10  Does mother feel breastfeeding is effective: Yes  Does infant appear satisfied after nursing:Yes  Stooling pattern normal: Yes - 5-7  Urinating frequently:Yes 10-12  Using shield or shells: No    Alternative/Artificial Feedings:   Bottle: N/A  Cup: No  Syringe/Finger: No           Formula Type: n/a                     Amount: n/a            Breast Milk:                      Amount: n/a            Frequency Q n/a Hr between feedings  Elimination Problems: No      Equipment:  Nipple Shield             Type: n/a             Size: n/a             Frequency of Use: n/a  Pump            Type: Anabel Baby            Frequency of Use: attempted one time  Shells            Type: n/a            Frequency of use: n/a    Equipment Problems: yes  -painful to use    Mom:  Breast: medium, round breasts with the left breast presenting larger than the right  Visible veining, areolas are light in color  Upon palpation, full glandular tissue  Nipple Assessment in General: Normal: elongated/eraser, no discoloration and no damage noted  Left nipple has a red face that Jamila Box states feels irritated  Mother's Awareness of Feeding Cues                 Recognizes:  Yes                  Verbalizes: Yes  Support System: Mom  History of Breastfeeding: first child  Changes/Stressors/Violence: nipple pain; exhausting; going back to work in the summer  Concerns/Goals: Renee Boggs wants to exclusively breast feed    Problems with Mom: exhausted    Physical Exam  HENT:      Head: Normocephalic  Nose: Nose normal    Neck:      Musculoskeletal: Normal range of motion  Cardiovascular:      Rate and Rhythm: Normal rate  Pulmonary:      Effort: Pulmonary effort is normal    Musculoskeletal: Normal range of motion  Neurological:      Mental Status: She is alert and oriented to person, place, and time  Skin:     General: Skin is warm and dry  Capillary Refill: Capillary refill takes less than 2 seconds  Psychiatric:         Mood and Affect: Mood is anxious and depressed  Behavior: Behavior normal       Comments: Renee Boggs shares she is anxious about taking care of El  She also states she is feeling depressed about life choices, missing a semester at college and yet feels she is the only person who can take care of [facility]  Renee Boggs would like to speak to the a postpartum therapist          Infant:  Behaviors: Alert and hunger cues  Color: Pink  Birth weight: 3365 g  Current weight: 3415 g    Problems with infant: painful latch      General Appearance:  Alert, active, no distress; hunger cues                             Head:  Normocephalic, AFOF, sutures opposed                             Eyes:  Conjunctiva clear, no drainage                              Ears:  Normally placed, no anomalies                             Nose:  Septum intact, no drainage or erythema                           Mouth:  No lesions  Blister on top lip; Palate presents with mild ridging; arched  Tongue extends, elevates WNL; bilateral laterization is minimal  Upon digital suck exam, El cups the finger well  Some disorganized suckling on the digit  Some chompy, some peristaltic movements  Finger sweep under tongue presents with small speed bump                      Neck:  Tighter on left with restriction, symmetrical, trachea midline, no adenopathy; thyroid: no enlargement, symmetric, no tenderness/mass/nodules                 Respiratory:  No grunting, flaring, retractions, breath sounds clear and equal            Cardiovascular:  Regular rate and rhythm  No murmur  Adequate perfusion/capillary refill  Femoral pulse present                    Abdomen:   Soft, non-tender, no masses, bowel sounds present, no HSM             Genitourinary:  Normal male, testes descended, no discharge, swelling, or pain, anus patent                          Spine:   No abnormalities noted        Musculoskeletal:  Full range of motion          Skin/Hair/Nails:   Skin warm, dry, and intact, no rashes or abnormal dyspigmentation or lesions                Neurologic:   No abnormal movement, tone appropriate for gestational age    [place] Latch:  Efficiency:               Lips Flanged: No, top lip blister              Depth of latch: shallow              Audible Swallow: Yes              Visible Milk: Yes              Wide Open/ Asymmetrical: No, shallow gape              Suck Swallow Cycle: Breathing: yes, Coordinated: sometimes  Nipple Assessment after latch: Normal: elongated/eraser, no discoloration and no damage noted  Latch Problems: shallow latch    Position:  Infant's Ergonomics/Body               Body Alignment: Yes               Head Supported: Yes               Close to Mom's body/ Lifted/ Supported: sometimes               Mom's Ergonomics/Body: No, hunches over breast                           Supported:  Yes                           Sitting Back: No                           Brings Baby to her breast: No, takes breast to baby  Positioning Problems: Sae Gordy will continue to try to bring El to the breast, not breast to [place]      Handouts:   Paced Bottle Feeding  Accept a bottle  Latch Check list  Breast Compressions  Hands on Pumping    Education:  Reviewed Latch: align the nose to the nipple, drag down to the chin to achieve a wide, deep latch; bring El to breast  Reviewed Positioning for Dyad: laid back, side lying and natural breastfeeding position  Reviewed Frequency/Supply & Demand: breast compressions; non nutritive suck on the breast  Reviewed Infant:Cues and varied States of Awareness  Reviewed Infant Elimination: continue to monitor  Reviewed Alternative/Artificial Feedings: paced bottle feeding; wait 4-6 weeks after birth; monitor pacifier use  Reviewed Mom/Breast care: wet wound care; reattach El when nipple pain occurs  Reviewed Equipment: paced bottle; will review pump at next consult      Plan:  Zehra Gregory is encouraged to:    Milk Supply:   - Respond to El's early feeding cues (eye movement; hand movement; rooting reflex)   - Offer both breasts at every breastfeeding session  You may offer each breast up to 2 times   - Practice Skin to Skin at home to increase milk supply and transfer        Feedings:   - Align the nipple to the nose, drag down to the chin to achieve a wide, deep latch   - Bring El to the breast not the breast to [place]   - Try new positions like laid back, side lying and natural breastfeeding position   - When Blue opens his mouth wide, Bring El to the breast not the breast to [place]   - Use breast compressions during feedings to increase milk transfer   - When nipple pain occurs, unlatch El and realign at the breast    Additional:   - Join community groups like Moms helping Moms and [facility]   - Referral to Postpartum wellness for anxiety about [place] and becoming a new mom   - review the [employer] video on paced bottle feeding   - Watch videos on [address] on good latch  To help your nipples heal, in addition to paying close attention to latch and positioning, apply protective ointment after feeding or pumping and cover with an occlusive dressing      Handouts:  Paced Bottle Feeding  Accept a bottle  Latch Check list  Breast Compressions  Hands on Pumping    Follow up in 1 week for lactation:           I have spent 90 minutes with Patient and family today in which greater than 50% of this time was spent in counseling/coordination of care regarding Patient and family education

## 2021-03-12 NOTE — PATIENT INSTRUCTIONS
[facility] is encouraged to:    Milk Supply:   - Respond to El's early feeding cues (eye movement; hand movement; rooting reflex)   - Offer both breasts at every breastfeeding session  You may offer each breast up to 2 times   - Practice Skin to Skin at home to increase milk supply and transfer        Feedings:   - Align the nipple to the nose, drag down to the chin to achieve a wide, deep latch   - Bring El to the breast not the breast to [place]   - Try new positions like laid back, side lying and natural breastfeeding position   - When Blue opens his mouth wide, Bring El to the breast not the breast to [place]   - Use breast compressions during feedings to increase milk transfer   - When nipple pain occurs, unlatch El and realign at the breast    Additional:   - Join community groups like Moms helping Moms and [facility]   - Referral to Postpartum wellness for anxiety about Blue and becoming a new mom   - review the [employer] video on paced bottle feeding   - Watch videos on [address] on good latch  To help your nipples heal, in addition to paying close attention to latch and positioning, apply protective ointment after feeding or pumping and cover with an occlusive dressing      Handouts:  Paced Bottle Feeding  Accept a bottle  Latch Check list  Breast Compressions  Hands on Pumping    Follow up in 1 week for lactation:

## 2021-03-12 NOTE — PROGRESS NOTES
Assessment/Plan:    Diagnoses and all orders for this visit:    Postpartum care and examination    BV (bacterial vaginosis)  -     clindamycin (CLEOCIN) 300 MG capsule; Take 1 capsule (300 mg total) by mouth 2 (two) times a day for 7 days    Urinary tract infection without hematuria, site unspecified  -     POCT urine dip  -     Urinalysis with microscopic  -     Urine culture     (spontaneous vaginal delivery)  -     docusate sodium (COLACE) 100 mg capsule; Take 1 capsule (100 mg total) by mouth 2 (two) times a day as needed for constipation    Iron deficiency anemia due to chronic blood loss  -     CBC; Future  -     CBC        Subjective: here for follow-up     Patient ID: Felicia Lester is a 21 y o  female  HPI    59-year-old female status post spontaneous vaginal delivery on 2021  She had a second-degree vaginal perineal laceration at the time of delivery have been doing well  She has a long history of chronic anemia followed through her pregnancy as well  During her immediate postpartum  Day 1 patient felt lightheaded and had some tachycardia after standing  She is found to  Have progression in anemia and was  Now symptomatic  Because this she was transfused 2 units of packed red blood cells  She ultimately felt better and was discharged on postpartum day 2, hemoglobin was 8 9 on discharge  She was seen and examined in the emergency room on  for recurrent bleeding and passing large blood clots  After evaluation by the gyn resident there was no further active bleeding noted and she was felt to be stable and ultimately discharged , her hemoglobin was 9 5 hematocrit 30 8  She is here today for follow-up  States she has only has moderate lochia at this time she has some a discomfort on voiding and ongoing discharge  She no longer has any symptoms of lightheadedness or dizziness  Has some discomfort with voiding and discharged UA C&S collected      Review of Systems Respiratory: Negative  Cardiovascular: Negative  Gastrointestinal: Negative  Genitourinary: Positive for vaginal bleeding and vaginal pain  Neurological: Negative  Psychiatric/Behavioral: Negative  All other systems reviewed and are negative  Objective:  No acute distress  LMP 05/28/2020 (Exact Date)      Physical Exam  Vitals signs reviewed  Exam conducted with a chaperone present  Constitutional:       Appearance: Normal appearance  Eyes:      Pupils: Pupils are equal, round, and reactive to light  Pulmonary:      Effort: Pulmonary effort is normal    Genitourinary:     Comments:  Vaginal exam, without speculum,  Yellow vaginal discharge with odor  Bimanual exam was deferred  Second-degree vaginal perineal  Laceration healing normally sutures are still in place  Wet mount positive for clue cells consistent with BV  Musculoskeletal: Normal range of motion  Skin:     General: Skin is warm and dry  Neurological:      Mental Status: She is alert and oriented to person, place, and time  Psychiatric:         Mood and Affect: Mood normal          Behavior: Behavior normal          Thought Content: Thought content normal          Judgment: Judgment normal           Please note    In addition to the time spent discussing the findings and results of today's visit and exam, I spent approximately 20 minutes of face-to-face time with the patient, greater than 50% of which was spent in counseling and coordination of care for this patient

## 2021-03-13 NOTE — PROGRESS NOTES
I have reviewed the notes, assessments, and/or procedures performed by Korene Seip, MA, IBCLC, I concur with her/his documentation of Tere Celis MD 03/13/21

## 2021-03-14 LAB
APPEARANCE UR: CLEAR
BACTERIA UR CULT: NORMAL
BACTERIA URNS QL MICRO: ABNORMAL
BILIRUB UR QL STRIP: NEGATIVE
COLOR UR: YELLOW
EPI CELLS #/AREA URNS HPF: ABNORMAL /HPF (ref 0–10)
GLUCOSE UR QL: NEGATIVE
HGB UR QL STRIP: NEGATIVE
KETONES UR QL STRIP: NEGATIVE
LEUKOCYTE ESTERASE UR QL STRIP: ABNORMAL
Lab: NORMAL
MICRO URNS: ABNORMAL
MUCOUS THREADS URNS QL MICRO: PRESENT
NITRITE UR QL STRIP: NEGATIVE
PH UR STRIP: 7 [PH] (ref 5–7.5)
PROT UR QL STRIP: NEGATIVE
RBC #/AREA URNS HPF: ABNORMAL /HPF (ref 0–2)
SP GR UR: 1.01 (ref 1–1.03)
UROBILINOGEN UR STRIP-ACNC: 0.2 MG/DL (ref 0.2–1)
WBC #/AREA URNS HPF: ABNORMAL /HPF (ref 0–5)

## 2021-03-18 ENCOUNTER — TELEPHONE (OUTPATIENT)
Dept: OBGYN CLINIC | Facility: CLINIC | Age: 21
End: 2021-03-18

## 2021-03-19 ENCOUNTER — OFFICE VISIT (OUTPATIENT)
Dept: POSTPARTUM | Facility: CLINIC | Age: 21
End: 2021-03-19
Payer: COMMERCIAL

## 2021-03-19 ENCOUNTER — TELEPHONE (OUTPATIENT)
Dept: OBGYN CLINIC | Facility: CLINIC | Age: 21
End: 2021-03-19

## 2021-03-19 VITALS — SYSTOLIC BLOOD PRESSURE: 112 MMHG | DIASTOLIC BLOOD PRESSURE: 66 MMHG

## 2021-03-19 DIAGNOSIS — O92.79 POOR LATCH ON, POSTPARTUM: ICD-10-CM

## 2021-03-19 DIAGNOSIS — Z62.820 COUNSELING FOR PARENT-CHILD PROBLEM: Primary | ICD-10-CM

## 2021-03-19 DIAGNOSIS — Z71.89 COUNSELING FOR PARENT-CHILD PROBLEM: Primary | ICD-10-CM

## 2021-03-19 PROCEDURE — 99404 PREV MED CNSL INDIV APPRX 60: CPT | Performed by: PEDIATRICS

## 2021-03-19 PROCEDURE — 1036F TOBACCO NON-USER: CPT | Performed by: PEDIATRICS

## 2021-03-19 NOTE — PATIENT INSTRUCTIONS
Korina Glover is encouraged to:  Milk Supply:   - Allow for non-nutritive suck at the breast to stimulate supply   - Allow for skin to skin during and after each breastfeeding session   - Use massage, heat, and hand expression prior to feedings to assist with deep latch   - Offer both breasts at least one time, can offer up to two times every breastfeeding session    Feedings:   - Position your baby facing you with your with your nipple at his nose  His ear, shoulder and hip should be in alignment  When he opens wide, move him onto the breast by applying pressure with your hand on his shoulders  - When feeding your expressed milk, use paced bottle feeding  This method is less stressful for your baby, prevents overfeeding and protects the breastfeeding relationship  Pay close attention to positioning for a deeper latch  Refer to the instructional video "Attaching Your Baby at the Breast" on the [address] [place] [address] website for further review  Additional:   - Educate self on sleeping patterns for newborns  Sweet Sleep by [facility] Int ; Nighttime Parenting by Dr Patricia Lion time after each diaper change to assist with muscle development   - Cycle your pump through stimulation and expression mode several times in a session to stimulate several let downs  Use hands on pumping and hand expression to increase your output  Maintain your pump as recommended       - Review the website for pumping pals or the official baby [facility] website for small smaller flange size    Handouts:  Tongue tie  Paced Bottle    Follow up in two weeks: March 31st @ 2 pm  Initial visit with Dr Nevin Dickens on April 20th @ 10 am

## 2021-03-19 NOTE — TELEPHONE ENCOUNTER
Patient called c/o did not hear back from Dr Belia Diane  Informed he is not in the office today  Advised finish Rx as directed, will continue to work over the next couple of days  Advised can use OTC HCC on labial  tissue that feels sore and irritated  Advised can call eduardo TRACY over weekend if sx worsen

## 2021-03-19 NOTE — PROGRESS NOTES
BREAST FEEDING FOLLOW UP VISIT    Informant/Relationship:Self    Discussion of General Lactation Issues: Renee Boggs states there is no nipple pain; breastfeeding is going better    Infant is 3weeks old today  Interval Breastfeeding History:    Frequency of breast feeding: 10-12  Does mother feel breastfeeding is effective: Yes  Does infant appear satisfied after nursing:Yes  Stooling pattern normal:Yes 8-9  Urinating frequently:Yes 10  Using shield or shells:No    Alternative/Artificial Feedings:   Bottle: baby jes; slow flow nipple  Cup: No  Syringe/Finger: No           Formula Type: n/a                     Amount: n/a            Breast Milk:                      Amount: 2 5 oz            Frequency: one time   Elimination Problems: No      Equipment:  Nipple Shield             Type: n/a             Size: n/a             Frequency of Use: n/a  Pump            Type: Baby Jes            Frequency of Use: one time  Shells            Type: n/a            Frequency of use: n/a    Equipment Problems: yes  Watched a [employer] video      Mom:  Breast: medium, round breasts with the left breast presenting larger than the right  Visible veining, areolas are light in color  Upon palpation, full glandular tissue on right breast, modest glandular tissue on the left  Renee Boggs states El ate from the left breast for the previous feeding  Nipple Assessment in General: Normal: elongated/eraser, no discoloration and no damage noted  Mother's Awareness of Feeding Cues                 Recognizes: Yes                  Verbalizes: Yes  Support System: maternal mother  History of Breastfeeding: first child  Changes/Stressors/Violence: still very tired; but no nipple pain; hear smacking and/or clicking sounds at the breast  Concerns/Goals: Renee Boggs wants to exclusively breast feed    Problems with Mom: very tired    Physical Exam  Constitutional:       Appearance: Normal appearance  HENT:      Head: Normocephalic        Nose: Nose normal    Neck:      Musculoskeletal: Normal range of motion  Cardiovascular:      Rate and Rhythm: Normal rate and regular rhythm  Pulses: Normal pulses  Pulmonary:      Effort: Pulmonary effort is normal    Musculoskeletal: Normal range of motion  Neurological:      Mental Status: She is alert and oriented to person, place, and time  Skin:     General: Skin is warm and dry  Capillary Refill: Capillary refill takes 2 to 3 seconds  Psychiatric:         Mood and Affect: Mood normal          Behavior: Behavior normal          Thought Content: Thought content normal          Judgment: Judgment normal          Infant:  Behaviors: Alert and hungry  Color: Pink  Birth weight: 3365 g  Current weight: 3640 g    Problems with infant: chompy at breast      General Appearance:  Alert, active, no distress; hunger cues                             Head:  Normocephalic, AFOF, sutures opposed                             Eyes:  Conjunctiva clear, no drainage                              Ears:  Normally placed, no anomolies                             Nose:  Septum intact, no drainage or erythema                           Mouth:  No lesions  Blister on top lip; Palate is mildly arched with ridging  Tongue extends over lower aveolar ridge, turns down; Elevation is at tip only; minimal bilateral laterization; Upon digital suck exam, cupping occurs but is lost frequently  Chompy on digit with some peristalitic movement; Finger sweep under tongue presents with thick, tight frenulum mid posterally placed  Neck:  Tighter on left with less restriction; shoulders are misaligned on laying prone, symmetrical, trachea midline, no adenopathy; thyroid: no enlargement, symmetric, no tenderness/mass/nodules                 Respiratory:  No grunting, flaring, retractions, breath sounds clear and equal            Cardiovascular:  Regular rate and rhythm  No murmur  Adequate perfusion/capillary refill   Femoral pulse present                    Abdomen:   Soft, non-tender, no masses, bowel sounds present, no HSM             Genitourinary:  Normal male, testes descended, no discharge, swelling, or pain, anus patent                          Spine:   No abnormalities noted        Musculoskeletal:  Full range of motion          Skin/Hair/Nails:   Skin warm, dry, and intact, no rashes or abnormal dyspigmentation or lesions                Neurologic:   No abnormal movement, tone appropriate for gestational age    [place] Latch:  Efficiency:               Lips Flanged: No, top lip blister              Depth of latch: shallow              Audible Swallow: Yes              Visible Milk: Yes              Wide Open/ Asymmetrical: No, shallow gape              Suck Swallow Cycle: Breathing: yes, Coordinated: sometimes  Nipple Assessment after latch: compressed stripe  Latch Problems: shallow latch; chompy at breast    Position:  Infant's Ergonomics/Body               Body Alignment: Yes               Head Supported: Yes               Close to Mom's body/ Lifted/ Supported: Yes               Mom's Ergonomics/Body: Yes                           Supported:  Yes                           Sitting Back: Yes                           Brings Baby to her breast: Yes  Positioning Problems: none      Handouts:   Paced bottle feeding and tongue tie    Education:  Reviewed Latch: flange lips; continue to align the nose to the nipple to entice a wide mouth and deep latch  Reviewed Positioning for Dyad: cross cradle  Reviewed Frequency/Supply & Demand: continue to meet early feeding cues and allow non-nutritional suck  Reviewed Infant:Cues and varied States of Awareness  Reviewed Infant Elimination: continue to monitor  Reviewed Alternative/Artificial Feedings: paced bottle  Reviewed Mom/Breast care: smaller flange; warmth, massage and breast compressions   Reviewed Equipment: cycle and hands on pumping; smaller flange; pumping pals      Plan: Roly Larsen is encouraged to:  Milk Supply:   - Allow for non-nutritive suck at the breast to stimulate supply   - Allow for skin to skin during and after each breastfeeding session   - Use massage, heat, and hand expression prior to feedings to assist with deep latch   - Offer both breasts at least one time, can offer up to two times every breastfeeding session    Feedings:   - Position your baby facing you with your with your nipple at his nose  His ear, shoulder and hip should be in alignment  When he opens wide, move him onto the breast by applying pressure with your hand on his shoulders  - When feeding your expressed milk, use paced bottle feeding  This method is less stressful for your baby, prevents overfeeding and protects the breastfeeding relationship  Pay close attention to positioning for a deeper latch  Refer to the instructional video "Attaching Your Baby at the Breast" on the [address] [place] [address] website for further review  Additional:   - Educate self on sleeping patterns for newborns  Sweet Sleep by [facility] Int ; Nighttime Parenting by Dr Gaetano Choi time after each diaper change to assist with muscle development   - Cycle your pump through stimulation and expression mode several times in a session to stimulate several let downs  Use hands on pumping and hand expression to increase your output  Maintain your pump as recommended  - Review the website for pumping pals or the official baby [facility] website for small smaller flange size    Handouts:  Tongue tie  Paced Bottle    Follow up in two weeks: March 31st @ 2 pm  Initial visit with Dr Warren Boeck on April 20th @ 10 am      I have spent 90 minutes with Patient and family today in which greater than 50% of this time was spent in counseling/coordination of care regarding Patient and family education

## 2021-03-21 NOTE — PROGRESS NOTES
I have reviewed the notes, assessments, and/or procedures performed by Kacie Mckoy MA, IBCLC, I concur with her/his documentation of Paul Whelan MD 03/21/21

## 2021-03-26 ENCOUNTER — TELEPHONE (OUTPATIENT)
Dept: OBGYN CLINIC | Facility: CLINIC | Age: 21
End: 2021-03-26

## 2021-03-26 DIAGNOSIS — N39.0 URINARY TRACT INFECTION WITHOUT HEMATURIA, SITE UNSPECIFIED: Primary | ICD-10-CM

## 2021-03-26 NOTE — TELEPHONE ENCOUNTER
Left a detailed message     ordered Urine culture  Donny Fry  asked pt to call back and schedule an exam with Dr Romero Alu

## 2021-03-31 ENCOUNTER — TELEPHONE (OUTPATIENT)
Dept: OBGYN CLINIC | Facility: CLINIC | Age: 21
End: 2021-03-31

## 2021-03-31 DIAGNOSIS — R79.89 LOW TSH LEVEL: Primary | ICD-10-CM

## 2021-04-01 LAB
ERYTHROCYTE [DISTWIDTH] IN BLOOD BY AUTOMATED COUNT: 16.6 % (ref 11.7–15.4)
HCT VFR BLD AUTO: 35.4 % (ref 34–46.6)
HGB BLD-MCNC: 11.3 G/DL (ref 11.1–15.9)
MCH RBC QN AUTO: 26.1 PG (ref 26.6–33)
MCHC RBC AUTO-ENTMCNC: 31.9 G/DL (ref 31.5–35.7)
MCV RBC AUTO: 82 FL (ref 79–97)
PLATELET # BLD AUTO: 248 X10E3/UL (ref 150–450)
RBC # BLD AUTO: 4.33 X10E6/UL (ref 3.77–5.28)
WBC # BLD AUTO: 5.9 X10E3/UL (ref 3.4–10.8)

## 2021-04-06 ENCOUNTER — APPOINTMENT (OUTPATIENT)
Dept: LAB | Facility: CLINIC | Age: 21
End: 2021-04-06
Payer: COMMERCIAL

## 2021-04-06 DIAGNOSIS — N39.0 URINARY TRACT INFECTION WITHOUT HEMATURIA, SITE UNSPECIFIED: ICD-10-CM

## 2021-04-06 DIAGNOSIS — R79.89 LOW TSH LEVEL: Primary | ICD-10-CM

## 2021-04-06 LAB — TSH SERPL DL<=0.05 MIU/L-ACNC: 0.46 UIU/ML (ref 0.46–3.98)

## 2021-04-06 PROCEDURE — 36415 COLL VENOUS BLD VENIPUNCTURE: CPT

## 2021-04-06 PROCEDURE — 84443 ASSAY THYROID STIM HORMONE: CPT

## 2021-04-06 PROCEDURE — 87086 URINE CULTURE/COLONY COUNT: CPT

## 2021-04-08 LAB — BACTERIA UR CULT: NORMAL

## 2021-04-09 ENCOUNTER — POSTPARTUM VISIT (OUTPATIENT)
Dept: OBGYN CLINIC | Facility: CLINIC | Age: 21
End: 2021-04-09

## 2021-04-09 VITALS
DIASTOLIC BLOOD PRESSURE: 80 MMHG | BODY MASS INDEX: 30.69 KG/M2 | SYSTOLIC BLOOD PRESSURE: 118 MMHG | HEIGHT: 65 IN | WEIGHT: 184.2 LBS

## 2021-04-09 DIAGNOSIS — Z30.011 ENCOUNTER FOR INITIAL PRESCRIPTION OF CONTRACEPTIVE PILLS: ICD-10-CM

## 2021-04-09 DIAGNOSIS — F41.9 ANXIETY AND DEPRESSION: ICD-10-CM

## 2021-04-09 DIAGNOSIS — F32.A ANXIETY AND DEPRESSION: ICD-10-CM

## 2021-04-09 PROCEDURE — 99024 POSTOP FOLLOW-UP VISIT: CPT | Performed by: OBSTETRICS & GYNECOLOGY

## 2021-04-09 RX ORDER — ACETAMINOPHEN AND CODEINE PHOSPHATE 120; 12 MG/5ML; MG/5ML
1 SOLUTION ORAL DAILY
Qty: 30 TABLET | Refills: 3 | Status: SHIPPED | OUTPATIENT
Start: 2021-04-09 | End: 2021-06-10 | Stop reason: SDUPTHER

## 2021-04-09 NOTE — PROGRESS NOTES
OB POSTPARTUM VISIT PROGRESS NOTE  Date of Encounter: April 9, 2021      Subjective   Martha Conklin is in for her postpartum visit  She is now 6 weeks postpartum  She delivered by normal spontaneous vaginal delivery  She's generally doing well, denies current pain or bleeding issues, and has no significant depression issues  She is breast feeding exclusively  We discussed all appropriate contraceptive options and she chooses  OCPs, Micronor, will start this coming Sunday       Objective  No Acute Distress  EXAM:  GENERAL: alert, well appearing, and in no distress  VITALS: Documented in the Nurses notes  BREASTS:   Deferred    PELVIC:   VULVA: Nml EGBUS  VAGINA: Introitus well healed, slightly tender  CERVIX: Normal, no discharge  UTERUS: Anteverted, NSSC, Mobile, NT    RIGHT ADNEXUM: Nontender, no mass  LEFT ADNEXUM: Nontender, no mass  RECTAL: Deferred      Assessment/Plan   Normal postpartum visit and exam  Reviewed contraceptive planning new start Micronor on Sunday   risks and benefits reviewed  Follow-up for annual exam  In July, her 1st,and p r genet Good DO

## 2021-04-12 ENCOUNTER — OFFICE VISIT (OUTPATIENT)
Dept: URGENT CARE | Facility: CLINIC | Age: 21
End: 2021-04-12
Payer: COMMERCIAL

## 2021-04-12 ENCOUNTER — TELEPHONE (OUTPATIENT)
Dept: FAMILY MEDICINE CLINIC | Facility: CLINIC | Age: 21
End: 2021-04-12

## 2021-04-12 VITALS
BODY MASS INDEX: 29.99 KG/M2 | OXYGEN SATURATION: 97 % | HEIGHT: 65 IN | HEART RATE: 113 BPM | RESPIRATION RATE: 17 BRPM | WEIGHT: 180 LBS | TEMPERATURE: 98.8 F

## 2021-04-12 DIAGNOSIS — J02.9 SORE THROAT: Primary | ICD-10-CM

## 2021-04-12 LAB — S PYO AG THROAT QL: NEGATIVE

## 2021-04-12 PROCEDURE — 87880 STREP A ASSAY W/OPTIC: CPT | Performed by: PREVENTIVE MEDICINE

## 2021-04-12 PROCEDURE — 87070 CULTURE OTHR SPECIMN AEROBIC: CPT | Performed by: PREVENTIVE MEDICINE

## 2021-04-12 PROCEDURE — 99213 OFFICE O/P EST LOW 20 MIN: CPT | Performed by: PREVENTIVE MEDICINE

## 2021-04-12 NOTE — TELEPHONE ENCOUNTER
Kamaljit Collazo scheduled herself for an appt at 12:30 with you today  She states she has ear pain and a sore throat  I advised her that we do virtual visits for this  She said that she had COVID in December and she knows that this is not COVID  Said she feels like it's a waste of money if you can't look at her ears or her throat  Insists she needs to come in  Are you OK with an office visit for this?

## 2021-04-12 NOTE — TELEPHONE ENCOUNTER
No, I am not okay with this visit  Your correct    We must stick to our rules of keeping all of us safe  She is past the 90 days where we can guarantee she does not have COVID    In order for her to come in she needs a negative recent, within the week, COVID test

## 2021-04-12 NOTE — PROGRESS NOTES
[facility]        NAME: Rosa Kim is a 21 y o  female  : 2000    MRN: [id number]  DATE: 2021  TIME: 1:28 PM    Assessment and Plan   Sore throat [J02 9]  1  Sore throat  POCT rapid strepA    Throat culture         Patient Instructions       Follow up with PCP in 3-5 days  Proceed to  ER if symptoms worsen  Chief Complaint     Chief Complaint   Patient presents with    Earache     Symptoms began about 1 week ago - Pt states she has bilateral ear pain and difficulty swallowing   Sore Throat    Nasal Congestion         History of Present Illness        Sore throat some postnasal drip and some sinus congestion times 2-4 days  Denies fever  Denies exposure to COVID  Denies shortness of breath or cough  Review of Systems   Review of Systems   Constitutional: Positive for fatigue  Negative for fever  HENT: Positive for sinus pressure and sore throat  Respiratory: Negative for shortness of breath            Current Medications       Current Outpatient Medications:     acetaminophen (TYLENOL) 325 mg tablet, Take 2 tablets (650 mg total) by mouth every 6 (six) hours, Disp: , Rfl: 0    albuterol (PROVENTIL HFA,VENTOLIN HFA) 90 mcg/act inhaler, Inhale 2 puffs as needed , Disp: , Rfl:     docusate sodium (COLACE) 100 mg capsule, Take 1 capsule (100 mg total) by mouth 2 (two) times a day as needed for constipation, Disp: 30 capsule, Rfl: 2    Ferrous Sulfate (SLOW FE PO), Take 325 mg by mouth daily, Disp: , Rfl:     ibuprofen (MOTRIN) 600 mg tablet, Take 1 tablet (600 mg total) by mouth every 6 (six) hours, Disp: 30 tablet, Rfl: 0    norethindrone (MICRONOR) 0 35 MG tablet, Take 1 tablet (0 35 mg total) by mouth daily, Disp: 30 tablet, Rfl: 3    pantoprazole (PROTONIX) 40 mg tablet, Take 1 tablet (40 mg total) by mouth 2 (two) times a day, Disp: 90 tablet, Rfl: 3    Prenatal Multivit-Min-Fe-FA (PRENATAL 1 + IRON PO), Take by mouth daily, Disp: , Rfl:    sertraline (ZOLOFT) 50 mg tablet, Take 50 mg by mouth daily, Disp: , Rfl:     sucralfate (CARAFATE) 1 g tablet, Take 1 tablet (1 g total) by mouth 2 (two) times a day, Disp: 180 tablet, Rfl: 3    Ferrex 150 150 MG capsule, TAKE 1 CAPSULE BY MOUTH TWICE A DAY (Patient not taking: Reported on 4/12/2021), Disp: 180 capsule, Rfl: 0    Current Allergies     Allergies as of 04/12/2021 - Reviewed 04/12/2021   Allergen Reaction Noted    Strawberry c [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 01/22/2019    Mold extract [trichophyton]  07/26/2020    Cat hair extract  02/15/2020            The following portions of the patient's history were reviewed and updated as appropriate: allergies, current medications, past family history, past medical history, past social history, past surgical history and problem list      Past Medical History:   Diagnosis Date    Abnormal ECG     no diagnosis    Anemia     Anxiety     Asthma     Depression     Endometriosis     Frequent sinus infections     GERD (gastroesophageal reflux disease)     Migraine     Syncope     Trauma     Urinary tract infection 03/12/2021    Dx by obgyn    Varicella     vaccine       Past Surgical History:   Procedure Laterality Date    TONSILLECTOMY  2006    UPPER GASTROINTESTINAL ENDOSCOPY  05/18/2017    Dysphagia, dilated to [address]   UPPER GASTROINTESTINAL ENDOSCOPY  10/07/2016    Dysphagia, dilated to [address]  Biopsies negative for eosinophilic esophagitis    UPPER GASTROINTESTINAL ENDOSCOPY  02/22/2018    Dysphagia, dilated to [address]    Biopsies negative for eosinophilic esophagitis    WISDOM TOOTH EXTRACTION         Family History   Problem Relation Age of Onset    Hypertension Mother    Ardeth Needs Arthritis Mother     Colon polyps Mother     Hyperlipidemia Father     Hypertension Maternal Grandfather     Stroke Maternal Grandfather     Myasthenia gravis Maternal Grandfather     Diabetes Paternal Kassandra Beech Stroke Paternal Grandfather     Cancer Paternal Grandfather     Bipolar disorder Sister     Suicide Attempts Sister     Mental illness Other     Colon cancer Other     Thyroid disease Maternal Grandmother     Colon polyps Maternal Grandmother     Bipolar disorder Paternal Aunt     Drug abuse Paternal Aunt     Eating disorder Paternal [de-identified]     Lung cancer Maternal Uncle     Substance Abuse Neg Hx          Medications have been verified  Objective   Pulse (!) 113   Temp 98 8 °F (37 1 °C) (Tympanic)   Resp 17   Ht 5' 5" (1 651 m)   Wt 81 6 kg (180 lb)   LMP 05/28/2020   SpO2 97%   Breastfeeding Yes   BMI 29 95 kg/m²   Patient's last menstrual period was 05/28/2020  Physical Exam     Physical Exam  HENT:      Right Ear: Tympanic membrane normal       Left Ear: Tympanic membrane normal       Mouth/Throat:      Mouth: Mucous membranes are moist  No oral lesions  Pharynx: No pharyngeal swelling, oropharyngeal exudate, posterior oropharyngeal erythema or uvula swelling         Rapid strep negative at 5 minutes

## 2021-04-14 LAB — BACTERIA THROAT CULT: NORMAL

## 2021-04-16 ENCOUNTER — IMMUNIZATIONS (OUTPATIENT)
Dept: FAMILY MEDICINE CLINIC | Facility: HOSPITAL | Age: 21
End: 2021-04-16

## 2021-04-16 DIAGNOSIS — Z23 ENCOUNTER FOR IMMUNIZATION: Primary | ICD-10-CM

## 2021-04-16 PROCEDURE — 0001A SARS-COV-2 / COVID-19 MRNA VACCINE (PFIZER-BIONTECH) 30 MCG: CPT

## 2021-04-16 PROCEDURE — 91300 SARS-COV-2 / COVID-19 MRNA VACCINE (PFIZER-BIONTECH) 30 MCG: CPT

## 2021-04-19 ENCOUNTER — TELEPHONE (OUTPATIENT)
Dept: POSTPARTUM | Facility: CLINIC | Age: 21
End: 2021-04-19

## 2021-04-19 NOTE — TELEPHONE ENCOUNTER
Keenan High called to cnl her appt with Apoorva Serum for today (4/19) - she did not want to wayne at this time - said will call back

## 2021-04-20 ENCOUNTER — OFFICE VISIT (OUTPATIENT)
Dept: POSTPARTUM | Facility: CLINIC | Age: 21
End: 2021-04-20

## 2021-04-20 DIAGNOSIS — O92.79 NURSING DIFFICULTY: ICD-10-CM

## 2021-04-20 NOTE — PROGRESS NOTES
BREAST FEEDING FOLLOW UP VISIT    Informant/Relationship: Michaela/mom    Discussion of General Lactation Issues: Darlena Duverney is still noting that El's latch is chompy  She is not having pain, but her nipple is still flattened or lipstick shaped when he releases it  Michaela nurses or pumps about every 2-3 hours  She doesn't always pump when Danilo Chan gets the bottle, but doesn't go more than 4 hours without pumping or feeding him at the breast     Infant is 9 weeks old today  Interval Breastfeeding History:    Frequency of breast feeding: about every 2-3 hours, occasionally every 1 5 hours; does get 1-2 bottles/day  Does mother feel breastfeeding is effective: Yes  Does infant appear satisfied after nursing:Yes  Stooling pattern normal:Yes  Urinating frequently:Yes  Using shield or shells:No    Alternative/Artificial Feedings:   Bottle: Yes, typically 2, using paced bottle feeding, one with EBM and one with formula  Cup: No  Syringe/Finger: No           Formula Type: Similac Advance                     Amount: 4 oz            Breast Milk:                      Amount: 4 oz            Frequency Q 2-3 Hr between feedings  Elimination Problems: No      Equipment:  Nipple Shield             Type: n/a             Size: n/a             Frequency of Use: n/a  Pump            Type: Anabel Baby            Frequency of Use: once daily   Shells            Type: n/a            Frequency of use: n/a    Equipment Problems: no      Mom:  Breast: Normal  Nipple Assessment in General: Normal: elongated/eraser, no discoloration and no damage noted  Mother's Awareness of Feeding Cues                 Recognizes:  Yes                  Verbalizes: Yes  Support System: [address] [place] [address] Momark parents, grandmother, and friends  History of Breastfeeding: None  Changes/Stressors/Violence: chompy feederr  Concerns/Goals: Darlena Duverney would like to be able to breastfeed at least part time for the first year    Problems with Mom: none    Physical Exam  Constitutional: Appearance: Normal appearance  She is well-developed and normal weight  HENT:      Head: Normocephalic and atraumatic  Eyes:      Extraocular Movements: Extraocular movements intact  Neck:      Musculoskeletal: Normal range of motion and neck supple  Thyroid: No thyromegaly  Cardiovascular:      Rate and Rhythm: Normal rate and regular rhythm  Heart sounds: Normal heart sounds  No murmur  Pulmonary:      Effort: Pulmonary effort is normal       Breath sounds: Normal breath sounds  Lymphadenopathy:      Cervical: No cervical adenopathy  Upper Body:      Right upper body: No pectoral adenopathy  Left upper body: No pectoral adenopathy  Neurological:      General: No focal deficit present  Mental Status: She is alert and oriented to person, place, and time  Psychiatric:         Mood and Affect: Mood normal          Behavior: Behavior normal          Thought Content: Thought content normal          Judgment: Judgment normal    Vitals signs and nursing note reviewed  Infant:  Behaviors: Alert  Color: Healthy  Birth weight: 3 365 kg  Current weight: 4 425 kg    Problems with infant: Restricted tongue movement      General Appearance:  Alert, active, no distress                             Head:  Normocephalic, AFOF, sutures opposed                             Eyes:  Conjunctiva clear, no drainage                              Ears:  Normally placed, no anomolies                             Nose:  Septum intact, no drainage or erythema                           Mouth:  No lesions; Palate is high and slightly arched; tongue with poor extension and lateralization; lift is about 15%;  Tongue moves in concert with the lower jaw in a choppy manner without good cupping of the examiner's finger; when El's jaw and cheeks are given support, he is able to better cup the examiner's finger and create better peristalsis with his tongue extending over the alveolar ridge, but it pulls back leading to biting and creates a snap back and a pinch posteriorly, squeezing the tip of the examiner's finger against the palate; frenulum is thin and tight extending from within 5 mm of the tip of the tongue to the tip of the lower alveolar ridge and causing blanching of the tip of the lower alveolar ridge with passive lift of the tongue  Neck:  Supple, symmetrical, trachea midline, no adenopathy; thyroid: no enlargement, symmetric, no tenderness/mass/nodules                 Respiratory:  No grunting, flaring, retractions, breath sounds clear and equal            Cardiovascular:  Regular rate and rhythm  No murmur  Adequate perfusion/capillary refill  Femoral pulse present                    Abdomen:   Soft, non-tender, no masses, bowel sounds present, no HSM             Genitourinary:  Normal male, testes descended, no discharge, swelling, or pain, anus patent                          Spine:   No abnormalities noted        Musculoskeletal:  Full range of motion          Skin/Hair/Nails:   Skin warm, dry, and intact, no rashes or abnormal dyspigmentation or lesions                Neurologic:   No abnormal movement, tone appropriate for gestational age     Latch:  Efficiency:               Lips Flanged: Yes, after frenotomy              Depth of latch: Excellent, after frenotomy              Audible Swallow: Yes, after frenotomy              Visible Milk: Yes, after frenotomy              Wide Open/ Asymmetrical: Yes, after frenotomy              Suck Swallow Cycle: Breathing: Unlabored, Coordinated: Yes  Nipple Assessment after latch: Normal: elongated/eraser, no discoloration and no damage noted  Latch Problems: El's latch is okay, but once at the breast he doesn't suck well his lower jaw and tongue move in concert in chompy up and down pattern  After the frenotomy, his latch is significantly wider and he develops a more normal suck and swallow pattern   Thuan Iniguez can feel the more appropriate and comfortable pull of suction  Position:  Infant's Ergonomics/Body               Body Alignment: Yes               Head Supported: Yes               Close to Mom's body/ Lifted/ Supported: Yes               Mom's Ergonomics/Body: Yes                           Supported: Yes                           Sitting Back: Yes                           Brings Baby to her breast: Yes  Positioning Problems: None      Education:  Reviewed Latch: Reviewed how to gently compress the breast as if offering a sandwich to facilitate a deeper latch  Reviewed Positioning for Dyad: Reviewed how to bring baby to the breast so that his lower lip and chin touch the breast with his nose just above the nipple to encourage a wider, more asymmetric latch  Reviewed Frequency/Supply & Demand: Recommended feeding on demand: when the baby gives hunger cues, when the breasts feel full, every 3 hours during the day and every 5 hours at night counting from the beginning of one feeding to the beginning of the next; whichever comes first          Plan:  Discussed history and physical exams with Savi Shah  Support given for her commitment to providing breast milk for her baby  Discussed the findings on the baby's exam consistent with tongue tie and reviewed how this may be the cause of nipple trauma, nipple pain, nipple damage, poor milk transfer, blocked ducts, mastitis, and loss of milk production  Discussed the science that supports performing the frenotomy to improve latch  I have spent 40 minutes with Patient  today in which greater than 50% of this time was spent in counseling/coordination of care regarding Prognosis, Risks and benefits of tx options, Intructions for management, Patient and family education and Impressions

## 2021-04-26 ENCOUNTER — CONSULT (OUTPATIENT)
Dept: ENDOCRINOLOGY | Facility: CLINIC | Age: 21
End: 2021-04-26
Payer: COMMERCIAL

## 2021-04-26 VITALS
DIASTOLIC BLOOD PRESSURE: 78 MMHG | HEIGHT: 65 IN | BODY MASS INDEX: 30.59 KG/M2 | WEIGHT: 183.6 LBS | SYSTOLIC BLOOD PRESSURE: 120 MMHG | HEART RATE: 104 BPM

## 2021-04-26 DIAGNOSIS — R79.89 LOW TSH LEVEL: ICD-10-CM

## 2021-04-26 PROCEDURE — 99244 OFF/OP CNSLTJ NEW/EST MOD 40: CPT | Performed by: INTERNAL MEDICINE

## 2021-04-26 NOTE — PROGRESS NOTES
Josias Faustin 21 y o  female MRN: [id number]    Encounter: [id number]      Assessment/Plan     Problem List Items Addressed This Visit        Other    Low TSH level     TSH was low in post in 2nd trimester pregnancy likely secondary to normal physiological changes in pregnancy or hyperemesis-TSH currently almost back to normal-for now will monitor and repeat thyroid function test in the next 4-6 weeks  She is at risk for developing postpartum thyroiditis and should be monitored closely         Relevant Orders    T4, free Lab Collect    TSH, 3rd generation Lab Collect    Thyroid Antibodies Panel Lab Collect    Thyroid stimulating immunoglobulin Lab Collect        CC:   Low tsh    History of Present Illness     HPI:  80-year-old female referred here for evaluation of abnormal thyroid function test    She is currently 8 weeks postpartum -it appears that she had a suppressed TSH back in early August last year  Repeat TSH in a few weeks improved  Hyperemesis during first and second trimester -had    Baby weighed 7 lbs     Lost 30 lbs initially and gained 15 lbs - since delivery lost 20 lbs     C/o constipation , c/o palpitations off and on since 13  C/o sleep issues even before having the baby   C/o cold intolerance   Has been breast feeding     Had regular menstrual cycles prior to pregnancy       Review of Systems    Historical Information   Past Medical History:   Diagnosis Date    Abnormal ECG     no diagnosis    Anemia     Anxiety     Asthma     Depression     Endometriosis     Frequent sinus infections     GERD (gastroesophageal reflux disease)     Migraine     Syncope     Trauma     Urinary tract infection 2021    Dx by obgyn    Varicella     vaccine     Past Surgical History:   Procedure Laterality Date    TONSILLECTOMY  2006    UPPER GASTROINTESTINAL ENDOSCOPY  2017    Dysphagia, dilated to [address]      UPPER GASTROINTESTINAL ENDOSCOPY  10/07/2016    Dysphagia, dilated to 47 [facility]  Biopsies negative for eosinophilic esophagitis    UPPER GASTROINTESTINAL ENDOSCOPY  02/22/2018    Dysphagia, dilated to 64 [facility]    Biopsies negative for eosinophilic esophagitis    WISDOM TOOTH EXTRACTION       Social History   Social History     Substance and Sexual Activity   Alcohol Use Not Currently    Comment: social     Social History     Substance and Sexual Activity   Drug Use Not Currently     Social History     Tobacco Use   Smoking Status Never Smoker   Smokeless Tobacco Never Used     Family History:   Family History   Problem Relation Age of Onset    Hypertension Mother     Arthritis Mother     Colon polyps Mother     Hyperlipidemia Father     Hypertension Maternal Grandfather     Stroke Maternal Grandfather     Myasthenia gravis Maternal Grandfather     Diabetes Paternal Grandfather     Stroke Paternal Grandfather     Cancer Paternal Grandfather     Bipolar disorder Sister     Suicide Attempts Sister     Mental illness Other     Colon cancer Other     Thyroid disease Maternal Grandmother     Colon polyps Maternal Grandmother     Bipolar disorder Paternal Aunt     Drug abuse Paternal Aunt     Eating disorder Paternal Aunt     Thyroid disease unspecified Paternal Aunt     Lung cancer Maternal Uncle     Thyroid disease unspecified Maternal Aunt     Substance Abuse Neg Hx        Meds/Allergies   Current Outpatient Medications   Medication Sig Dispense Refill    acetaminophen (TYLENOL) 325 mg tablet Take 2 tablets (650 mg total) by mouth every 6 (six) hours  0    albuterol (PROVENTIL HFA,VENTOLIN HFA) 90 mcg/act inhaler Inhale 2 puffs as needed       Ferrous Sulfate (SLOW FE PO) Take 325 mg by mouth daily      norethindrone (MICRONOR) 0 35 MG tablet Take 1 tablet (0 35 mg total) by mouth daily 30 tablet 3    pantoprazole (PROTONIX) 40 mg tablet Take 1 tablet (40 mg total) by mouth 2 (two) times a day 90 tablet 3    Prenatal Multivit-Min-Fe-FA (PRENATAL 1 + IRON PO) Take by mouth daily      sertraline (ZOLOFT) 50 mg tablet Take 50 mg by mouth daily      sucralfate (CARAFATE) 1 g tablet Take 1 tablet (1 g total) by mouth 2 (two) times a day 180 tablet 3     No current facility-administered medications for this visit  Allergies   Allergen Reactions    Strawberry C [Ascorbate - Food Allergy] Anaphylaxis    Mold Extract [Trichophyton]     Cat Hair Extract        Objective   Vitals: Blood pressure 120/78, pulse 104, height 5' 5" (1 651 m), weight 83 3 kg (183 lb 9 6 oz), last menstrual period 05/28/2020, currently breastfeeding  Physical Exam  Vitals signs reviewed  Constitutional:       Appearance: Normal appearance  She is not ill-appearing or diaphoretic  HENT:      Head: Normocephalic and atraumatic  Eyes:      General: No scleral icterus  Extraocular Movements: Extraocular movements intact  Neck:      Musculoskeletal: Neck supple  Cardiovascular:      Rate and Rhythm: Normal rate and regular rhythm  Heart sounds: Normal heart sounds  No murmur  Pulmonary:      Effort: Pulmonary effort is normal  No respiratory distress  Breath sounds: Normal breath sounds  No wheezing or rales  Abdominal:      General: There is no distension  Palpations: Abdomen is soft  Tenderness: There is no abdominal tenderness  There is no guarding  Musculoskeletal:      Right lower leg: No edema  Left lower leg: No edema  Lymphadenopathy:      Cervical: No cervical adenopathy  Skin:     General: Skin is warm and dry  Neurological:      General: No focal deficit present  Mental Status: She is alert and oriented to person, place, and time  Psychiatric:         Mood and Affect: Mood normal          Behavior: Behavior normal          Thought Content: Thought content normal          The history was obtained from the review of the chart, patient      Lab Results:   Lab Results   Component Value Date/Time    TSH 3RD Shraddha Prudent 0 458 (L) 04/06/2021 01:31 PM    TSH 3RD GENERATON 0 351 (L) 08/28/2020 02:31 PM    TSH 3RD GENERATON 0 027 (L) 08/02/2020 04:53 AM    Free T4 1 69 (H) 08/02/2020 04:53 AM    Free t4 1 31 08/17/2020 12:01 PM       Imaging Studies:   Results for orders placed during the hospital encounter of 02/06/19   US thyroid    Impression No nodule meets current ACR criteria for requiring biopsy or followup ultrasounds  Reference: ACR Thyroid Imaging, Reporting and Data System (TI-RADS): White Paper of the [employer]  J AM Sola Radiol 8025;[id number]  (additional recommendations based on American Thyroid Association 2015 guidelines )      Workstation performed: [id number]         I have personally reviewed pertinent reports  Portions of the record may have been created with voice recognition software  Occasional wrong word or "sound a like" substitutions may have occurred due to the inherent limitations of voice recognition software  Read the chart carefully and recognize, using context, where substitutions have occurred

## 2021-04-26 NOTE — ASSESSMENT & PLAN NOTE
TSH was low in post in 2nd trimester pregnancy likely secondary to normal physiological changes in pregnancy or hyperemesis-TSH currently almost back to normal-for now will monitor and repeat thyroid function test in the next 4-6 weeks    She is at risk for developing postpartum thyroiditis and should be monitored closely

## 2021-04-27 ENCOUNTER — OFFICE VISIT (OUTPATIENT)
Dept: FAMILY MEDICINE CLINIC | Facility: CLINIC | Age: 21
End: 2021-04-27
Payer: COMMERCIAL

## 2021-04-27 VITALS
WEIGHT: 181.5 LBS | BODY MASS INDEX: 30.24 KG/M2 | RESPIRATION RATE: 18 BRPM | HEIGHT: 65 IN | HEART RATE: 80 BPM | DIASTOLIC BLOOD PRESSURE: 80 MMHG | TEMPERATURE: 98.4 F | SYSTOLIC BLOOD PRESSURE: 122 MMHG

## 2021-04-27 DIAGNOSIS — K21.9 GASTROESOPHAGEAL REFLUX DISEASE, UNSPECIFIED WHETHER ESOPHAGITIS PRESENT: ICD-10-CM

## 2021-04-27 DIAGNOSIS — F33.2 MAJOR DEPRESSIVE DISORDER, RECURRENT SEVERE WITHOUT PSYCHOTIC FEATURES (HCC): ICD-10-CM

## 2021-04-27 DIAGNOSIS — R79.89 LOW TSH LEVEL: ICD-10-CM

## 2021-04-27 DIAGNOSIS — J45.20 ASTHMA, ALLERGIC, MILD INTERMITTENT, UNCOMPLICATED: ICD-10-CM

## 2021-04-27 DIAGNOSIS — Z00.00 ROUTINE GENERAL MEDICAL EXAMINATION AT A HEALTH CARE FACILITY: Primary | ICD-10-CM

## 2021-04-27 PROBLEM — O99.019 ANEMIA AFFECTING PREGNANCY, ANTEPARTUM: Status: RESOLVED | Noted: 2020-09-11 | Resolved: 2021-04-27

## 2021-04-27 PROBLEM — O98.513 COVID-19 AFFECTING PREGNANCY IN THIRD TRIMESTER: Status: RESOLVED | Noted: 2020-12-28 | Resolved: 2021-04-27

## 2021-04-27 PROBLEM — Z3A.39 39 WEEKS GESTATION OF PREGNANCY: Status: RESOLVED | Noted: 2020-08-01 | Resolved: 2021-04-27

## 2021-04-27 PROBLEM — U07.1 COVID-19 AFFECTING PREGNANCY IN THIRD TRIMESTER: Status: RESOLVED | Noted: 2020-12-28 | Resolved: 2021-04-27

## 2021-04-27 PROCEDURE — 99215 OFFICE O/P EST HI 40 MIN: CPT | Performed by: FAMILY MEDICINE

## 2021-04-27 PROCEDURE — 1036F TOBACCO NON-USER: CPT | Performed by: FAMILY MEDICINE

## 2021-04-27 PROCEDURE — 99395 PREV VISIT EST AGE 18-39: CPT | Performed by: FAMILY MEDICINE

## 2021-04-27 RX ORDER — BUDESONIDE AND FORMOTEROL FUMARATE DIHYDRATE 80; 4.5 UG/1; UG/1
2 AEROSOL RESPIRATORY (INHALATION) 2 TIMES DAILY
Qty: 1 INHALER | Refills: 5 | Status: SHIPPED | OUTPATIENT
Start: 2021-04-27

## 2021-04-27 NOTE — PROGRESS NOTES
ASSESSMENT/PLAN:    Mild asthma allergic set off by the spring  Patient will begin Symbicort 2 puffs twice a day  Demonstrated how to use inhaler  When she feels back to normal she could try 1 puff twice a day or 2 puffs daily  Spring allergy should and middle of June  Hopefully she will cut down and not need her Proventil at all which is usually what happens    Reflux  Continue omeprazole and sucralfate/Carafate    Low TSH level  Coming back to normal  Patient being followed by endocrine as well  Will have another TSH in 6-8 weeks since patient is at risk for thyroiditis    Depression  On Zoloft and seeing a counselor continue the same    Patient will continue the Symbicort until mid June or if she feels she needs it more she of course could do a throughout the year    We will check her back in 1 year or sooner if needed      BMI Counseling: Body mass index is 30 2 kg/m²  The BMI is above normal  Nutrition recommendations include decreasing portion sizes  Exercise recommendations include exercising 3-5 times per week  Patient is 8 weeks postpartum    She is running 4-5 days weekly and cutting down her calories             Health Maintenance   Topic Date Due    HPV Vaccine (1 - 2-dose series) Never done    Hepatitis A Vaccine (2 of 2 - 2-dose series) 05/21/2013    BMI: Followup Plan  03/10/2021    Annual Physical  03/10/2021    COVID-19 Vaccine (2 - Pfizer 2-dose series) 05/07/2021    Chlamydia Screening  07/28/2021    Depression Remission PHQ  02/09/2022    BMI: Adult  04/27/2022    DTaP,Tdap,and Td Vaccines (5 - Td) 01/06/2031    HIV Screening  Completed    Hepatitis C Screening  Completed    Pneumococcal Vaccine: Pediatrics (0 to 5 Years) and At-Risk Patients (6 to 59 Years)  Completed    HIB Vaccine  Completed    Hepatitis B Vaccine  Completed    IPV Vaccine  Completed    Influenza Vaccine  Completed    Meningococcal ACWY Vaccine  Aged Out         Problem List as of 4/27/2021 Reviewed: 4/12/2021 1:29 PM by Charlene Cristina MD    GERD (gastroesophageal reflux disease)    Low TSH level    Last Assessment & Plan 4/26/2021 Consult Written 4/26/2021  3:53 PM by Jesús Macias MD     TSH was low in post in 2nd trimester pregnancy likely secondary to normal physiological changes in pregnancy or hyperemesis-TSH currently almost back to normal-for now will monitor and repeat thyroid function test in the next 4-6 weeks  She is at risk for developing postpartum thyroiditis and should be monitored closely         Major depressive disorder, recurrent severe without psychotic features ([address] )    Post traumatic stress disorder (PTSD)    Premenstrual symptom            Subjective:   Chief Complaint   Patient presents with    Physical Exam     Patient is 8 weeks postpartum    She is nursing her 6week-old baby  She did notice that her asthma is acting up again and she needs albuterol 3 times a day  Historically in the spring she usually needs an inhaled steroid like Symbicort to keep her asthma under control  Once the allergies have passed she is fine  So she is asking if she can do this since she is nursing    She is still on Zoloft and sees a therapist so she has no problems with anxiety that she could see    She has reflux disease and is on both omeprazole and Carafate at this time      patient ID: Saint Sheng is a 21 y o  female  Past Medical History:   Diagnosis Date    Abnormal ECG     no diagnosis    Anemia     Anxiety     Asthma     Depression     Endometriosis     Frequent sinus infections     GERD (gastroesophageal reflux disease)     Migraine     Syncope     Trauma     Urinary tract infection 03/12/2021    Dx by abisai    Varicella     vaccine     Past Surgical History:   Procedure Laterality Date    TONSILLECTOMY  2006    UPPER GASTROINTESTINAL ENDOSCOPY  05/18/2017    Dysphagia, dilated to [address]   UPPER GASTROINTESTINAL ENDOSCOPY  10/07/2016    [facility]  Biopsies negative for eosinophilic esophagitis    UPPER GASTROINTESTINAL ENDOSCOPY  02/22/2018    Dysphagia, dilated to [address]  Biopsies negative for eosinophilic esophagitis    WISDOM TOOTH EXTRACTION       Family History   Problem Relation Age of Onset    Hypertension Mother    Keila Contreras Arthritis Mother     Colon polyps Mother     Hyperlipidemia Father     Hypertension Maternal Grandfather     Stroke Maternal Grandfather     Myasthenia gravis Maternal Grandfather     Diabetes Paternal Grandfather     Stroke Paternal Grandfather     Cancer Paternal Grandfather     Bipolar disorder Sister     Suicide Attempts Sister     Mental illness Other     Colon cancer Other     Thyroid disease Maternal Grandmother     Colon polyps Maternal Grandmother     Bipolar disorder Paternal Aunt     Drug abuse Paternal Aunt     Eating disorder Paternal Aunt     Thyroid disease unspecified Paternal Aunt     Lung cancer Maternal Uncle     Thyroid disease unspecified Maternal Aunt     Substance Abuse Neg Hx      Social History     Tobacco Use    Smoking status: Never Smoker    Smokeless tobacco: Never Used   Substance Use Topics    Alcohol use: Not Currently     Comment: social    Drug use: Not Currently     Social History     Tobacco Use   Smoking Status Never Smoker   Smokeless Tobacco Never Used        MED LIST WAS REVIEWED AND UPDATED       ROS  As per HPI  Rest of 12 point review of systems negative     Objective:      VITALS:  Wt Readings from Last 3 Encounters:   04/27/21 82 3 kg (181 lb 8 oz)   04/26/21 83 3 kg (183 lb 9 6 oz)   04/12/21 81 6 kg (180 lb)     BP Readings from Last 3 Encounters:   04/27/21 122/80   04/26/21 120/78   04/09/21 118/80     Pulse Readings from Last 3 Encounters:   04/27/21 80   04/26/21 104   04/12/21 (!) 113     Body mass index is 30 2 kg/m²  Laboratory Results:    All pertinent labs and studies were reviewed with patient during this office visit  with highlights of the results contained in this notes ASSESSMENT AND PLAN section       Physical Exam    Gen  No acute distress well-appearing well-nourished appears stated age    Mental status  Good judgment and insight oriented to time person and place, recent and remote memory intact mood and affect normal cooperative and patient is reasonable    HEENT  PERRLA 3 mm, EOMI without nystagmus, normocephalic atraumatic without facial weakness      Neck   supple no masses trachea midline positive click normal carotid upstrokes with no bruits    Cor  Regular rhythm without ectopy or murmur, no S3-S4, normal palpation that is no heave lift or thrill    Vascular  No edema, good pedal pulses    Lungs  CTA bilaterally in no respiratory distress no wheezes rhonchi or rales, normal to palpation no tactile fremitus    Abdomen  Soft, no palpable masses, no hepatosplenomegaly, normal bowel sounds, nontender    Lymphatics  No palpable nodes in the neck, supraclavicular area, axilla, or groin     Musculoskeletal  No clubbing cyanosis or edema muscle tone normal    Skin  no rashes or abnormal appearing lesions    Neuro  Normal ambulation, cranial nerves 2-12 grossly intact, higher functioning with reasoning intact

## 2021-04-27 NOTE — PROGRESS NOTES
SUBJECTIVE:-------------------------------------------------------------------------------------------    Jessica Herman is a 21 y o   female and is here for routine health maintenance  Health Maintenance   Topic Date Due    HPV Vaccine (1 - 2-dose series) Never done    Hepatitis A Vaccine (2 of 2 - 2-dose series) 05/21/2013    BMI: Followup Plan  03/10/2021    Annual Physical  03/10/2021    COVID-19 Vaccine (2 - Pfizer 2-dose series) 05/07/2021    Chlamydia Screening  07/28/2021    Depression Remission PHQ  02/09/2022    BMI: Adult  04/27/2022    DTaP,Tdap,and Td Vaccines (5 - Td) 01/06/2031    HIV Screening  Completed    Hepatitis C Screening  Completed    Pneumococcal Vaccine: Pediatrics (0 to 5 Years) and At-Risk Patients (6 to 59 Years)  Completed    HIB Vaccine  Completed    Hepatitis B Vaccine  Completed    IPV Vaccine  Completed    Influenza Vaccine  Completed    Meningococcal ACWY Vaccine  Aged Out     Immunization History   Administered Date(s) Administered    DTaP 2000, 2000, 2000, 2000, 01/15/2001, 01/21/2002, 07/25/2005    Hep A, adult 11/21/2012    Hep B, Adolescent or Pediatric 2000, 2000, 01/15/2001    HiB 2000, 2000, 01/15/2001, 10/22/2001    INFLUENZA 10/20/2017, 12/31/2018    IPV 2000, 2000, 2000, 2000, 01/15/2001, 01/21/2002, 07/25/2005    Influenza, injectable, quadrivalent, preservative free 0 5 mL 10/27/2020    MMR 07/23/2001, 07/25/2005    Meningococcal B, Recombinant ([place]) 08/01/2019    Meningococcal MCV4P 09/22/2015, 08/01/2019    Pneumococcal Conjugate 13-Valent 2000, 2000, 01/15/2001, 10/22/2001    SARS-CoV-2 / COVID-19 mRNA IM (Pfizer-BioNTech) 04/16/2021    Tdap 12/31/2018, 01/06/2021    Varicella 07/23/2001, 07/22/2009         Diet and Physical Activity  Diet: well balanced diet  Body mass index is 30 2 kg/m²    Exercise: frequently, runs 4-5 times weekly General Health  Hearing:  Is normal  Vision: sees ophthalmologist/optometrist only as needed  Dental:  Sees dentist every 3 months      Smoker no        ASSESSMENT/PLAN:-------------------------------------------------------------------------------------------    Patient's physical is up-to-date   Immunizations are up-to-date  Cancer screenings are up-to-date      Patient instructed on exercise  Patient instructed on healthy choices for diet       NEXT PHYSICAL 1 YEAR          The following portions of the patient's history were reviewed and updated as appropriate: allergies, current medications, past family history, past medical history, past social history, past surgical history and problem list       OBJECTIVE:---------------------------------------------------------------------------------------------------    /80 (BP Location: Left arm, Patient Position: Sitting, Cuff Size: Standard)   Pulse 80   Temp 98 4 °F (36 9 °C) (Oral)   Resp 18   Ht 5' 5" (1 651 m)   Wt 82 3 kg (181 lb 8 oz)   BMI 30 20 kg/m²   Wt Readings from Last 3 Encounters:   04/27/21 82 3 kg (181 lb 8 oz)   04/26/21 83 3 kg (183 lb 9 6 oz)   04/12/21 81 6 kg (180 lb)     BP Readings from Last 3 Encounters:   04/27/21 122/80   04/26/21 120/78   04/09/21 118/80     Pulse Readings from Last 3 Encounters:   04/27/21 80   04/26/21 104   04/12/21 (!) 113     Body mass index is 30 2 kg/m²    Health Maintenance   Topic Date Due    HPV Vaccine (1 - 2-dose series) Never done    Hepatitis A Vaccine (2 of 2 - 2-dose series) 05/21/2013    BMI: Followup Plan  03/10/2021    Annual Physical  03/10/2021    COVID-19 Vaccine (2 - Pfizer 2-dose series) 05/07/2021    Chlamydia Screening  07/28/2021    Depression Remission PHQ  02/09/2022    BMI: Adult  04/27/2022    DTaP,Tdap,and Td Vaccines (5 - Td) 01/06/2031    HIV Screening  Completed    Hepatitis C Screening  Completed    Pneumococcal Vaccine: Pediatrics (0 to 5 Years) and At-Risk Patients (6 to 64 Years)  Completed    HIB Vaccine  Completed    Hepatitis B Vaccine  Completed    IPV Vaccine  Completed    Influenza Vaccine  Completed    Meningococcal ACWY Vaccine  Aged Out       ROS:   12 point review of systems negative            PHYSICAL EXAM:    Gen  No acute distress well-appearing well-nourished appears stated age    Mental status  Good judgment and insight oriented to time person and place, recent and remote memory intact mood and affect normal cooperative and patient is reasonable    HEENT  PERRLA 3 mm, EOMI without nystagmus, TMs clear, turbinates open pink no exudate, pharynx benign, tongue midline    Neck   supple no masses trachea midline positive click normal carotid upstrokes with no bruits    Cor  Regular rhythm without ectopy or murmur, no S3-S4, normal palpation that is no heave lift or thrill    Vascular  No edema, good pedal pulses    Lungs  CTA bilaterally in no respiratory distress no wheezes rhonchi or rales, normal to palpation no tactile fremitus    Abdomen  Soft, no palpable masses, no hepatosplenomegaly, normal bowel sounds, nontender    Lymphatics  No palpable nodes in the neck, supraclavicular area, axilla, or groin     Musculoskeletal  No clubbing cyanosis or edema muscle tone normal 12 point review of systems is negative    Skin  no rashes or abnormal appearing lesions    Neuro  Normal ambulation, cranial nerves 2-12 grossly intact, higher functioning with reasoning intact

## 2021-04-27 NOTE — PATIENT INSTRUCTIONS
Mild asthma allergic set off by the spring  Patient will begin Symbicort 2 puffs twice a day  Demonstrated how to use inhaler  When she feels back to normal she could try 1 puff twice a day or 2 puffs daily  Spring allergy should and middle of June  Hopefully she will cut down and not need her Proventil at all which is usually what happens    Reflux  Continue omeprazole and sucralfate/Carafate    Low TSH level  Coming back to normal  Patient being followed by endocrine as well  Will have another TSH in 6-8 weeks since patient is at risk for thyroiditis    Depression  On Zoloft and seeing a counselor continue the same    Patient will continue the Symbicort until mid June or if she feels she needs it more she of course could do a throughout the year    We will check her back in 1 year or sooner if needed

## 2021-04-29 ENCOUNTER — TELEPHONE (OUTPATIENT)
Dept: PSYCHIATRY | Facility: CLINIC | Age: 21
End: 2021-04-29

## 2021-05-10 ENCOUNTER — IMMUNIZATIONS (OUTPATIENT)
Dept: FAMILY MEDICINE CLINIC | Facility: HOSPITAL | Age: 21
End: 2021-05-10

## 2021-05-10 DIAGNOSIS — Z23 ENCOUNTER FOR IMMUNIZATION: Primary | ICD-10-CM

## 2021-05-10 PROCEDURE — 0002A SARS-COV-2 / COVID-19 MRNA VACCINE (PFIZER-BIONTECH) 30 MCG: CPT

## 2021-05-10 PROCEDURE — 91300 SARS-COV-2 / COVID-19 MRNA VACCINE (PFIZER-BIONTECH) 30 MCG: CPT

## 2021-06-02 ENCOUNTER — TELEPHONE (OUTPATIENT)
Dept: OBGYN CLINIC | Facility: CLINIC | Age: 21
End: 2021-06-02

## 2021-06-02 NOTE — TELEPHONE ENCOUNTER
LM for patient to call back or answer [employer] message that was sent yesterday and give reason for appointment she self scheduled with Dr Lora Delgado as none of the questions were answered or the appointment may be cancelled

## 2021-06-10 ENCOUNTER — OFFICE VISIT (OUTPATIENT)
Dept: OBGYN CLINIC | Facility: CLINIC | Age: 21
End: 2021-06-10
Payer: COMMERCIAL

## 2021-06-10 ENCOUNTER — ULTRASOUND (OUTPATIENT)
Dept: OBGYN CLINIC | Facility: CLINIC | Age: 21
End: 2021-06-10
Payer: COMMERCIAL

## 2021-06-10 VITALS — WEIGHT: 179 LBS | SYSTOLIC BLOOD PRESSURE: 112 MMHG | DIASTOLIC BLOOD PRESSURE: 68 MMHG | BODY MASS INDEX: 29.79 KG/M2

## 2021-06-10 DIAGNOSIS — Z30.011 ENCOUNTER FOR INITIAL PRESCRIPTION OF CONTRACEPTIVE PILLS: ICD-10-CM

## 2021-06-10 DIAGNOSIS — B37.3 VAGINAL CANDIDIASIS: ICD-10-CM

## 2021-06-10 DIAGNOSIS — N95.2 VAGINAL ATROPHY: ICD-10-CM

## 2021-06-10 DIAGNOSIS — R10.2 PELVIC PAIN: Primary | ICD-10-CM

## 2021-06-10 LAB
BV WHIFF TEST VAG QL: NEGATIVE
CLUE CELLS SPEC QL WET PREP: NEGATIVE
PH SMN: 3.5 [PH]
SL AMB POCT WET MOUNT: YES
T VAGINALIS VAG QL WET PREP: NEGATIVE
YEAST VAG QL WET PREP: POSITIVE

## 2021-06-10 PROCEDURE — 1036F TOBACCO NON-USER: CPT | Performed by: OBSTETRICS & GYNECOLOGY

## 2021-06-10 PROCEDURE — 99214 OFFICE O/P EST MOD 30 MIN: CPT | Performed by: OBSTETRICS & GYNECOLOGY

## 2021-06-10 PROCEDURE — 87210 SMEAR WET MOUNT SALINE/INK: CPT | Performed by: OBSTETRICS & GYNECOLOGY

## 2021-06-10 PROCEDURE — 76830 TRANSVAGINAL US NON-OB: CPT | Performed by: OBSTETRICS & GYNECOLOGY

## 2021-06-10 RX ORDER — ACETAMINOPHEN AND CODEINE PHOSPHATE 120; 12 MG/5ML; MG/5ML
1 SOLUTION ORAL DAILY
Qty: 84 TABLET | Refills: 3 | Status: SHIPPED | OUTPATIENT
Start: 2021-06-10 | End: 2021-07-10

## 2021-06-10 NOTE — PROGRESS NOTES
AMB US Pelvic Non OB    Date/Time: 6/10/2021 1:44 PM  Performed by: Court Reza  Authorized by: Ingrid Treadwell MD   [place] Protocol:  Patient identity confirmed: verbally with patient      Procedure details:     Technique:  Transvaginal US, Non-OB    Position: lithotomy exam    Uterine findings:     Length (cm): 8 2    Height (cm):  4 08    Width (cm):  4 47    Endometrial stripe: identified      Endometrium thickness (mm):  5 4  Left ovary findings:     Left ovary:  Visualized    Length (cm): 2 62    Height (cm): 1 4    Width (cm): 1 47  Right ovary findings:     Right ovary:  Visualized    Length (cm): 2 95    Height (cm): 1 66    Width (cm): 1 8  Other findings:     Free pelvic fluid: not identified      Free peritoneal fluid: not identified    Post-Procedure Details:     Impression:  Retroflexed uterus and bilateral ovaries appear within normal limits  Visualization of the uterine fundus was difficult due to patient discomfort and retroflexed orientation  No free fluid  Tolerance: Tolerated well, no immediate complications    Complications: no complications    Additional Procedure Comments:      Transabdominal and transvaginal ultrasound techniques utilized today  for[MD] F8 E8C-RS transvaginal transducer Serial # [id number] was used to perform the examination today and subsequently followed with high level disinfection utilizing Trophon EPR procedure  Ultrasound performed at:     [address] [address]  Phone:  [phone]  Fax:  [phone]

## 2021-06-10 NOTE — PROGRESS NOTES
Assessment/Plan   Diagnoses and all orders for this visit:    Pelvic pain    Encounter for initial prescription of contraceptive pills  -     norethindrone (MICRONOR) 0 35 MG tablet; Take 1 tablet (0 35 mg total) by mouth daily    Vaginal candidiasis  -     POCT wet mount    Vaginal atrophy    1  Pelvic cramping-she had noted is this over the past for weeks or so  She notes contraction like pain, which seems to be increased at night  She does note low-grade temperature between 99 9 and 100  She denies any chills, sweats, nausea, vomiting, diarrhea or any association with GI/ symptoms  Exam was notable for some mild tenderness toward the left adnexa  No masses were appreciated  Ultrasound was without any findings  Some tenderness in the vagina was noted related to possible atrophic changes versus yeast   Would recommend anti-inflammatory medications such as Motrin or Tylenol as needed  To follow-up with any worsening or persistent symptoms  2  Yeast-noted on exam/wet prep  Patient was counseled about the findings  To take OTC miconazole 3 or 7   3  Vaginal atrophy-patient is breastfeeding her 1month-old baby  Some mild changes are noted of atrophy in the vagina  This could be contributing to her discomfort with bimanual exam   To call with any symptoms  She is not currently sexually active at this time so she cannot attest to dyspareunia  4  Contraception-continues on mini pill  Electronic prescription was sent today for 3 packs refill 3  She feels well on this product, did not feel as well on combined OCP previously  5  Previous BV-treated with clindamycin by Dr Gordon Mcarthur at previous visit  No BV noted on wet prep today  6  History of anemia-had postpartum hemorrhage, transfusion x2 units  Hemoglobin was 8 9 with discharge  Recent hemoglobin 11 3 on 3/31/21 lab testing  She continues prenatal vitamin with iron  7  History of depression-continues on Zoloft  8   Long history of GERD-no current issues  9  Thyroid-testing from 21 with slightly decreased TSH of 0 458  Did see Dr Linda Hernandez of endocrinology, has follow-up testing as recommended by her period  Follow-up 3-6 months for yearly exam or as needed  Subjective   Patient ID: Luba Farah is a 21 y o  female  Vitals:    06/10/21 1314   BP: 112/68     Patient was seen today for follow-up visit  Please see assessment plan for details  The following portions of the patient's history were reviewed and updated as appropriate: allergies, current medications, past family history, past medical history, past social history, past surgical history and problem list   Past Medical History:   Diagnosis Date    Abnormal ECG     no diagnosis    Anemia     Anxiety     Asthma     Depression     Endometriosis     Frequent sinus infections     GERD (gastroesophageal reflux disease)     Migraine     Syncope     Trauma     Urinary tract infection 2021    Dx by obgyn    Varicella     vaccine     Past Surgical History:   Procedure Laterality Date    TONSILLECTOMY  2006    UPPER GASTROINTESTINAL ENDOSCOPY  2017    Dysphagia, dilated to [address] [place]   UPPER GASTROINTESTINAL ENDOSCOPY  10/07/2016    Dysphagia, dilated to [address] [place]  Biopsies negative for eosinophilic esophagitis    UPPER GASTROINTESTINAL ENDOSCOPY  2018    Dysphagia, dilated to [address] [place]    Biopsies negative for eosinophilic esophagitis    WISDOM TOOTH EXTRACTION       OB History    Para Term  AB Living   2 1 1 0 1 1   SAB TAB Ectopic Multiple Live Births   1 0 0 0 1      # Outcome Date GA Lbr Lino/2nd Weight Sex Delivery Anes PTL Lv   2 Term 21 39w4d / 00:52 3365 g (7 lb 6 7 oz) M Vag-Spont EPI N SEN   1 SAB                Current Outpatient Medications:     acetaminophen (TYLENOL) 325 mg tablet, Take 2 tablets (650 mg total) by mouth every 6 (six) hours, Disp: , Rfl: 0    albuterol (PROVENTIL HFA,VENTOLIN HFA) 90 mcg/act inhaler, Inhale 2 puffs as needed , Disp: , Rfl:     budesonide-formoterol (SYMBICORT) 80-4 5 MCG/ACT inhaler, Inhale 2 puffs 2 (two) times a day Rinse mouth after use , Disp: 1 Inhaler, Rfl: 5    norethindrone (MICRONOR) 0 35 MG tablet, Take 1 tablet (0 35 mg total) by mouth daily, Disp: 84 tablet, Rfl: 3    pantoprazole (PROTONIX) 40 mg tablet, Take 1 tablet (40 mg total) by mouth 2 (two) times a day, Disp: 90 tablet, Rfl: 3    Prenatal Multivit-Min-Fe-FA (PRENATAL 1 + IRON PO), Take by mouth daily, Disp: , Rfl:     sertraline (ZOLOFT) 50 mg tablet, Take 50 mg by mouth daily, Disp: , Rfl:     sucralfate (CARAFATE) 1 g tablet, Take 1 tablet (1 g total) by mouth 2 (two) times a day, Disp: 180 tablet, Rfl: 3    Ferrous Sulfate (SLOW FE PO), Take 325 mg by mouth daily, Disp: , Rfl:   Allergies   Allergen Reactions    Strawberry C [Ascorbate - Food Allergy] Anaphylaxis    Cat Hair Extract     Mold Extract [Trichophyton] Rash     Social History     Socioeconomic History    Marital status: Single     Spouse name: None    Number of children: None    Years of education: None    Highest education level: High school graduate   Occupational History    Occupation: employeed    Social Needs    Financial resource strain: Not hard at all   [place] insecurity     Worry: Never true     Inability: Never true    Transportation needs     Medical: No     Non-medical: No   Tobacco Use    Smoking status: Never Smoker    Smokeless tobacco: Never Used   Substance and Sexual Activity    Alcohol use: Yes     Frequency: Monthly or less     Comment: social    Drug use: Not Currently    Sexual activity: Not Currently     Birth control/protection: None   Lifestyle    Physical activity     Days per week: 4 days     Minutes per session: 90 min    Stress:  To some extent   Relationships    Social connections     Talks on phone: More than three times a week     Gets together: More than three times a week     Attends Yazidism service: More than 4 times per year     Active member of club or organization: No     Attends meetings of clubs or organizations: Never     Relationship status: Never     Intimate partner violence     Fear of current or ex partner: No     Emotionally abused: No     Physically abused: No     Forced sexual activity: No   Other Topics Concern    None   Social History Narrative    None     Family History   Problem Relation Age of Onset    Hypertension Mother     Arthritis Mother     Colon polyps Mother     Hyperlipidemia Father     Hypertension Maternal Grandfather     Stroke Maternal Grandfather     Myasthenia gravis Maternal Grandfather     Diabetes Paternal Grandfather     Stroke Paternal Grandfather     Cancer Paternal Grandfather     Bipolar disorder Sister     Suicide Attempts Sister     Mental illness Other     Colon cancer Other     Thyroid disease Maternal Grandmother     Colon polyps Maternal Grandmother     Bipolar disorder Paternal Aunt     Drug abuse Paternal Aunt     Eating disorder Paternal Aunt     Thyroid disease unspecified Paternal Aunt     Lung cancer Maternal Uncle     Thyroid disease unspecified Maternal Aunt     Substance Abuse Neg Hx        Review of Systems   Constitutional: Negative for chills, diaphoresis, fatigue and fever  Respiratory: Negative for apnea, cough, chest tightness, shortness of breath and wheezing  Cardiovascular: Negative for chest pain, palpitations and leg swelling  Gastrointestinal: Negative for abdominal distention, abdominal pain, anal bleeding, constipation, diarrhea, nausea, rectal pain and vomiting  Genitourinary: Negative for difficulty urinating, dyspareunia, dysuria, frequency, hematuria, menstrual problem, pelvic pain, urgency, vaginal bleeding, vaginal discharge and vaginal pain  Musculoskeletal: Negative for arthralgias, back pain and myalgias  Skin: Negative for color change and rash     Neurological: Negative for dizziness, syncope, light-headedness, numbness and headaches  Hematological: Negative for adenopathy  Does not bruise/bleed easily  Psychiatric/Behavioral: Negative for dysphoric mood and sleep disturbance  The patient is not nervous/anxious  Objective   Physical Exam  OBGyn Exam     Objective      /68 (BP Location: Left arm, Patient Position: Sitting, Cuff Size: Adult)   Wt 81 2 kg (179 lb)   LMP  (LMP Unknown)   BMI 29 79 kg/m²     General:   alert and oriented, in no acute distress   Neck:    Breast:    Heart:    Lungs:    Abdomen: soft, non-tender, without masses or organomegaly   Vulva: normal   Vagina: Small amount of white discharge, without erythema or lesions  Mildly tender to palpation   Cervix: Small amount of white discharge, without lesions or cervicitis  No Cervical motion tenderness   Uterus: top normal size, retroverted, non-tender   Adnexa: Mildly tender to the left  No mass appreciated    Right was negative   Rectum: negative    Psych:  Normal mood and affect   Skin:  Without obvious lesions   Eyes: symmetric, with normal movements and reactivity   Musculoskeletal:  Normal muscle tone and movements appreciated

## 2021-06-10 NOTE — PATIENT INSTRUCTIONS
Yeast Infection   WHAT YOU NEED TO KNOW:   What is a yeast infection? A yeast infection, or vulvovaginal candidiasis, is a common vaginal infection  Vulvovaginal candidiasis is caused by a fungus, or yeast-like germ  Fungi are normally found in your vagina  Too many fungi can cause an infection  What increases my risk for a yeast infection? · Pregnancy    · Medicines, such as antibiotics, birth control pills, or steroid medicine    · Medical conditions, such as diabetes    · Contraceptive devices, such as diaphragms, sponges, and intrauterine devices    What are the signs and symptoms of a yeast infection? · Thick, white, cheese-like discharge from your vagina    · Itching, swelling, and redness in your vagina    · Burning when you urinate    How is a yeast infection diagnosed and treated? · Your healthcare provider will ask about your medical history and examine you  A sample of your vaginal discharge may show what germ is causing your infection  · Medicines help treat the fungal infection and decrease inflammation  The medicine may be a pill, cream, ointment, or vaginal tablet or suppository  With treatment, the infection is usually gone within a week  Keep your vagina healthy:   · Do not have sex until your symptoms go away  Have your partner wear a condom until you complete your course of medication  · Always wipe from front to back  after you use the toilet  This prevents spreading bacteria from your rectal area into your vagina  · Clean around your vulva with mild soap and warm water each day  Gently dry the area after washing  Do not use hot tubs  The heat and moisture from hot tubs can increase your risk for another yeast infection  · Do not wear tight-fitting clothes or undergarments  for long periods  Wear cotton underwear during the day  [place]ton helps keep your genital area dry and does not hold in warmth or moisture  Do not wear underwear at night      · Change your laundry soap or fabric softener  if you think it is irritating your skin  · Do not douche  or use feminine hygiene sprays or bubble bath  Do not use pads or tampons that are scented, or colored or perfumed toilet paper  · Ask your healthcare provider about birth control options if necessary  Condoms have latex and diaphragms have gel that kills sperm  Both of these may irritate your genital area  When should I contact my healthcare provider? · You have fever and chills  · You develop abdominal or pelvic pain  · Your discharge is bloody and it is not your monthly period  · Your signs and symptoms get worse, even after treatment  · You have questions or concerns about your condition or care  CARE AGREEMENT:   You have the right to help plan your care  Learn about your health condition and how it may be treated  Discuss treatment options with your healthcare providers to decide what care you want to receive  You always have the right to refuse treatment  The above information is an  only  It is not intended as medical advice for individual conditions or treatments  Talk to your doctor, nurse or pharmacist before following any medical regimen to see if it is safe and effective for you  © Copyright [address] [place] [address] Drive Information is for End User's use only and may not be sold, redistributed or otherwise used for commercial purposes   All illustrations and images included in CareNotes® are the copyrighted property of A D [employer] M , [employer]  or [address] [place], [place] [address]

## 2021-06-17 ENCOUNTER — OFFICE VISIT (OUTPATIENT)
Dept: GASTROENTEROLOGY | Facility: CLINIC | Age: 21
End: 2021-06-17
Payer: COMMERCIAL

## 2021-06-17 ENCOUNTER — TELEPHONE (OUTPATIENT)
Dept: GASTROENTEROLOGY | Facility: CLINIC | Age: 21
End: 2021-06-17

## 2021-06-17 VITALS
BODY MASS INDEX: 30.06 KG/M2 | HEIGHT: 65 IN | DIASTOLIC BLOOD PRESSURE: 70 MMHG | SYSTOLIC BLOOD PRESSURE: 110 MMHG | HEART RATE: 84 BPM | WEIGHT: 180.4 LBS

## 2021-06-17 DIAGNOSIS — K21.00 GASTROESOPHAGEAL REFLUX DISEASE WITH ESOPHAGITIS WITHOUT HEMORRHAGE: ICD-10-CM

## 2021-06-17 DIAGNOSIS — R13.19 ESOPHAGEAL DYSPHAGIA: Primary | ICD-10-CM

## 2021-06-17 PROCEDURE — 1036F TOBACCO NON-USER: CPT | Performed by: INTERNAL MEDICINE

## 2021-06-17 PROCEDURE — 3008F BODY MASS INDEX DOCD: CPT | Performed by: INTERNAL MEDICINE

## 2021-06-17 PROCEDURE — 99214 OFFICE O/P EST MOD 30 MIN: CPT | Performed by: INTERNAL MEDICINE

## 2021-06-17 RX ORDER — PANTOPRAZOLE SODIUM 40 MG/1
40 TABLET, DELAYED RELEASE ORAL 2 TIMES DAILY
Qty: 180 TABLET | Refills: 3 | Status: SHIPPED | OUTPATIENT
Start: 2021-06-17 | End: 2021-09-15

## 2021-06-17 RX ORDER — SUCRALFATE 1 G/1
1 TABLET ORAL 2 TIMES DAILY
Qty: 180 TABLET | Refills: 3 | Status: SHIPPED | OUTPATIENT
Start: 2021-06-17 | End: 2021-09-15

## 2021-06-17 RX ORDER — PANTOPRAZOLE SODIUM 40 MG/1
40 TABLET, DELAYED RELEASE ORAL 2 TIMES DAILY
Qty: 180 TABLET | Refills: 3 | Status: SHIPPED | OUTPATIENT
Start: 2021-06-17 | End: 2021-06-17

## 2021-06-17 RX ORDER — SUCRALFATE 1 G/1
1 TABLET ORAL 2 TIMES DAILY
Qty: 180 TABLET | Refills: 3 | Status: SHIPPED | OUTPATIENT
Start: 2021-06-17 | End: 2021-06-17

## 2021-06-17 NOTE — H&P (VIEW-ONLY)
[id number] - Outpatient Follow-up Note  Swati Job 21 y o  female MRN: [id number]  Encounter: [id number]    ASSESSMENT AND PLAN:      1  Esophageal dysphagia  20F w severe reflux, severe hyperemesis during her pregnancy, now 3 months post partum, here for dysphagia  Given severity of her reflux, will need to rule out esophagitis, stricture  H/o dilations so will need to bx for EOE  - Schedule EGD & dilation @ [address]  2  Gastroesophageal reflux disease with esophagitis without hemorrhage    - GERD lifestyle modifications and weight loss    - pantoprazole (PROTONIX) 40 mg tablet; Take 1 tablet (40 mg total) by mouth 2 (two) times a day  Dispense: 180 tablet; Refill: 3  - sucralfate (CARAFATE) 1 g tablet; Take 1 tablet (1 g total) by mouth 2 (two) times a day  Dispense: 180 tablet; Refill: 3    3  Lactating mother  Discussed the current guidelines RE propofol and breastfeeding      Followup Appointment: 6 months  ______________________________________________________________________    Chief Complaint   Patient presents with    Heartburn     Bad throughout pregnancy and still bothering her  Insurance giving her trouble aobut Protonix and length of use  HPI:  27-year-old female here today for dysphagia  Last year, she was seen by our nurse practitioner for severe reflux, hyper emesis during her pregnancy  Patient states that she was on a Zofran pump, getting IV fluids regularly, and was throwing up for a full 9 months  The baby was delivered March of this year and is healthy  Patient no longer has any nausea  However, she has ongoing reflux and recently started noticing a lot of dysphagia  She has a history of dysphagia requiring dilations  At times the solid food is getting stuck in the middle of her throat  Denies any issues with liquids  No GI bleeding  A lot of heartburn especially at night and continues to take b i d  PPI and Carafate  Tries to not eat at nighttime  Historical Information   Past Medical History:   Diagnosis Date    Abnormal ECG     no diagnosis    Anemia     Anxiety     Asthma     Depression     Endometriosis     Frequent sinus infections     GERD (gastroesophageal reflux disease)     Migraine     Syncope     Trauma     Urinary tract infection 03/12/2021    Dx by obgyn    Varicella     vaccine     Past Surgical History:   Procedure Laterality Date    TONSILLECTOMY  2006    UPPER GASTROINTESTINAL ENDOSCOPY  05/18/2017    Dysphagia, dilated to [facility]   UPPER GASTROINTESTINAL ENDOSCOPY  10/07/2016    Dysphagia, dilated to [facility]  Biopsies negative for eosinophilic esophagitis    UPPER GASTROINTESTINAL ENDOSCOPY  02/22/2018    [facility]    Biopsies negative for eosinophilic esophagitis    WISDOM TOOTH EXTRACTION       Social History     Substance and Sexual Activity   Alcohol Use Yes    Comment: social     Social History     Substance and Sexual Activity   Drug Use Not Currently     Social History     Tobacco Use   Smoking Status Never Smoker   Smokeless Tobacco Never Used     Family History   Problem Relation Age of Onset    Hypertension Mother     Arthritis Mother     Colon polyps Mother     Hyperlipidemia Father     Hypertension Maternal Grandfather     Stroke Maternal Grandfather     Myasthenia gravis Maternal Grandfather     Diabetes Paternal Grandfather     Stroke Paternal Grandfather     Cancer Paternal Grandfather     Bipolar disorder Sister     Suicide Attempts Sister     Mental illness Other     Colon cancer Other     Thyroid disease Maternal Grandmother     Colon polyps Maternal Grandmother     Bipolar disorder Paternal Aunt     Drug abuse Paternal Aunt     Eating disorder Paternal Aunt     Thyroid disease unspecified Paternal Aunt     Lung cancer Maternal Uncle     Thyroid disease unspecified Maternal Aunt     Substance Abuse Neg Hx          Current Outpatient Medications:     acetaminophen (TYLENOL) 325 mg tablet    albuterol (PROVENTIL HFA,VENTOLIN HFA) 90 mcg/act inhaler    budesonide-formoterol (SYMBICORT) 80-4 5 MCG/ACT inhaler    norethindrone (MICRONOR) 0 35 MG tablet    pantoprazole (PROTONIX) 40 mg tablet    Prenatal Multivit-Min-Fe-FA (PRENATAL 1 + IRON PO)    sertraline (ZOLOFT) 50 mg tablet    sucralfate (CARAFATE) 1 g tablet  Allergies   Allergen Reactions    Strawberry C [Ascorbate - Food Allergy] Anaphylaxis    Cat Hair Extract     Mold Extract [Trichophyton] Rash     Reviewed medications and allergies and updated as indicated    PHYSICAL EXAM:    Blood pressure 110/70, pulse 84, height 5' 5" (1 651 m), weight 81 8 kg (180 lb 6 4 oz), currently breastfeeding  Body mass index is 30 02 kg/m²  General Appearance: NAD, cooperative, alert  Eyes: Anicteric, PERRLA, EOMI  ENT:  Normocephalic, atraumatic, normal mucosa  Neck:  Supple, symmetrical, trachea midline  Resp:  Clear to auscultation bilaterally; no rales, rhonchi or wheezing; respirations unlabored   CV:  S1 S2, Regular rate and rhythm; no murmur, rub, or gallop  GI:  Soft, non-tender, non-distended; normal bowel sounds; no masses, no organomegaly   Rectal: Deferred  Musculoskeletal: No cyanosis, clubbing or edema  Normal ROM    Skin:  No jaundice, rashes, or lesions   Heme/Lymph: No palpable cervical lymphadenopathy  Psych: Normal affect, good eye contact  Neuro: No gross deficits, AAOx3    Lab Results:   Lab Results   Component Value Date    WBC 5 9 03/31/2021    HGB 11 3 03/31/2021    HCT 35 4 03/31/2021    MCV 82 03/31/2021     03/31/2021     Lab Results   Component Value Date    K 4 0 03/05/2021     03/05/2021    CO2 26 03/05/2021    BUN 4 (L) 03/05/2021    CREATININE 0 62 03/05/2021    CALCIUM 8 3 03/05/2021    CORRECTEDCA 9 6 03/05/2021    AST 21 03/05/2021    ALT 28 03/05/2021    ALKPHOS 119 (H) 03/05/2021    EGFR 130 03/05/2021     No results found for: IRON, TIBC, FERRITIN  Lab Results   Component Value Date    LIPASE 116 08/28/2020       Radiology Results:   No results found

## 2021-06-17 NOTE — PATIENT INSTRUCTIONS
Gastroesophageal Reflux Disease   WHAT YOU NEED TO KNOW:   Gastroesophageal reflux disease (GERD) is reflux that occurs more than twice a week for a few weeks  Reflux means acid and food in the stomach back up into the esophagus  It usually causes heartburn and other symptoms  GERD can cause other health problems over time if it is not treated  DISCHARGE INSTRUCTIONS:   Call your local emergency number (911 in the [address] [facility]) if:   · You have severe chest pain and sudden trouble breathing  Return to the emergency department if:   · You have trouble breathing after you vomit  · You have trouble swallowing, or pain with swallowing  · Your bowel movements are black, bloody, or tarry-looking  · Your vomit looks like coffee grounds or has blood in it  Call your doctor or gastroenterologist if:   · You feel full and cannot burp or vomit  · You vomit large amounts, or you vomit often  · You are losing weight without trying  · Your symptoms get worse or do not improve with treatment  · You have questions or concerns about your condition or care  Medicines:   · Medicines  are used to decrease stomach acid  Medicine may also be used to help your lower esophageal sphincter and stomach contract (tighten) more  · Take your medicine as directed  Contact your healthcare provider if you think your medicine is not helping or if you have side effects  Tell him of her if you are allergic to any medicine  Keep a list of the medicines, vitamins, and herbs you take  Include the amounts, and when and why you take them  Bring the list or the pill bottles to follow-up visits  Carry your medicine list with you in case of an emergency  Manage GERD:   · Do not have foods or drinks that may increase heartburn  These include chocolate, peppermint, fried or fatty foods, drinks that contain caffeine, or carbonated drinks (soda)  Other foods include spicy foods, onions, tomatoes, and tomato-based foods   Do not have foods or drinks that can irritate your esophagus, such as citrus fruits, juices, and alcohol  · Do not eat large meals  When you eat a lot of food at one time, your stomach needs more acid to digest it  Eat 6 small meals each day instead of 3 large ones, and eat slowly  Do not eat meals 2 to 3 hours before bedtime  · Elevate the head of your bed  Place 6-inch blocks under the head of your bed frame  You may also use more than one pillow under your head and shoulders while you sleep  · Maintain a healthy weight  If you are overweight, weight loss may help relieve symptoms of GERD  · Do not smoke  Smoking weakens the lower esophageal sphincter and increases the risk of GERD  Ask your healthcare provider for information if you currently smoke and need help to quit  E-cigarettes or smokeless tobacco still contain nicotine  Talk to your healthcare provider before you use these products  · Do not wear clothing that is tight around your waist   Tight clothing can put pressure on your stomach and cause or worsen GERD symptoms  Follow up with your doctor or gastroenterologist as directed:  Write down your questions so you remember to ask them during your visits  © Copyright [address] [place] [address] Drive Information is for End User's use only and may not be sold, redistributed or otherwise used for commercial purposes  All illustrations and images included in CareNotes® are the copyrighted property of A D A M , Inc  or [facility]   The above information is an  only  It is not intended as medical advice for individual conditions or treatments  Talk to your doctor, nurse or pharmacist before following any medical regimen to see if it is safe and effective for you

## 2021-06-17 NOTE — PROGRESS NOTES
[id number] - Outpatient Follow-up Note  Delia Hinton 21 y o  female MRN: [id number]  Encounter: [id number]    ASSESSMENT AND PLAN:      1  Esophageal dysphagia  20F w severe reflux, severe hyperemesis during her pregnancy, now 3 months post partum, here for dysphagia  Given severity of her reflux, will need to rule out esophagitis, stricture  H/o dilations so will need to bx for EOE  - Schedule EGD & dilation @ [address]  2  Gastroesophageal reflux disease with esophagitis without hemorrhage    - GERD lifestyle modifications and weight loss    - pantoprazole (PROTONIX) 40 mg tablet; Take 1 tablet (40 mg total) by mouth 2 (two) times a day  Dispense: 180 tablet; Refill: 3  - sucralfate (CARAFATE) 1 g tablet; Take 1 tablet (1 g total) by mouth 2 (two) times a day  Dispense: 180 tablet; Refill: 3    3  Lactating mother  Discussed the current guidelines RE propofol and breastfeeding      Followup Appointment: 6 months  ______________________________________________________________________    Chief Complaint   Patient presents with    Heartburn     Bad throughout pregnancy and still bothering her  Insurance giving her trouble aobut Protonix and length of use  HPI:  70-year-old female here today for dysphagia  Last year, she was seen by our nurse practitioner for severe reflux, hyper emesis during her pregnancy  Patient states that she was on a Zofran pump, getting IV fluids regularly, and was throwing up for a full 9 months  The baby was delivered March of this year and is healthy  Patient no longer has any nausea  However, she has ongoing reflux and recently started noticing a lot of dysphagia  She has a history of dysphagia requiring dilations  At times the solid food is getting stuck in the middle of her throat  Denies any issues with liquids  No GI bleeding  A lot of heartburn especially at night and continues to take b i d  PPI and Carafate  Tries to not eat at nighttime  Historical Information   Past Medical History:   Diagnosis Date    Abnormal ECG     no diagnosis    Anemia     Anxiety     Asthma     Depression     Endometriosis     Frequent sinus infections     GERD (gastroesophageal reflux disease)     Migraine     Syncope     Trauma     Urinary tract infection 03/12/2021    Dx by obgyn    Varicella     vaccine     Past Surgical History:   Procedure Laterality Date    TONSILLECTOMY  2006    UPPER GASTROINTESTINAL ENDOSCOPY  05/18/2017    Dysphagia, dilated to [facility]   UPPER GASTROINTESTINAL ENDOSCOPY  10/07/2016    Dysphagia, dilated to 47 [facility]  Biopsies negative for eosinophilic esophagitis    UPPER GASTROINTESTINAL ENDOSCOPY  02/22/2018    [facility]    Biopsies negative for eosinophilic esophagitis    WISDOM TOOTH EXTRACTION       Social History     Substance and Sexual Activity   Alcohol Use Yes    Comment: social     Social History     Substance and Sexual Activity   Drug Use Not Currently     Social History     Tobacco Use   Smoking Status Never Smoker   Smokeless Tobacco Never Used     Family History   Problem Relation Age of Onset    Hypertension Mother     Arthritis Mother     Colon polyps Mother     Hyperlipidemia Father     Hypertension Maternal Grandfather     Stroke Maternal Grandfather     Myasthenia gravis Maternal Grandfather     Diabetes Paternal Grandfather     Stroke Paternal Grandfather     Cancer Paternal Grandfather     Bipolar disorder Sister     Suicide Attempts Sister     Mental illness Other     Colon cancer Other     Thyroid disease Maternal Grandmother     Colon polyps Maternal Grandmother     Bipolar disorder Paternal Aunt     Drug abuse Paternal Aunt     Eating disorder Paternal Aunt     Thyroid disease unspecified Paternal Aunt     Lung cancer Maternal Uncle     Thyroid disease unspecified Maternal Aunt     Substance Abuse Neg Hx          Current Outpatient Medications:     acetaminophen (TYLENOL) 325 mg tablet    albuterol (PROVENTIL HFA,VENTOLIN HFA) 90 mcg/act inhaler    budesonide-formoterol (SYMBICORT) 80-4 5 MCG/ACT inhaler    norethindrone (MICRONOR) 0 35 MG tablet    pantoprazole (PROTONIX) 40 mg tablet    Prenatal Multivit-Min-Fe-FA (PRENATAL 1 + IRON PO)    sertraline (ZOLOFT) 50 mg tablet    sucralfate (CARAFATE) 1 g tablet  Allergies   Allergen Reactions    Strawberry C [Ascorbate - Food Allergy] Anaphylaxis    Cat Hair Extract     Mold Extract [Trichophyton] Rash     Reviewed medications and allergies and updated as indicated    PHYSICAL EXAM:    Blood pressure 110/70, pulse 84, height 5' 5" (1 651 m), weight 81 8 kg (180 lb 6 4 oz), currently breastfeeding  Body mass index is 30 02 kg/m²  General Appearance: NAD, cooperative, alert  Eyes: Anicteric, PERRLA, EOMI  ENT:  Normocephalic, atraumatic, normal mucosa  Neck:  Supple, symmetrical, trachea midline  Resp:  Clear to auscultation bilaterally; no rales, rhonchi or wheezing; respirations unlabored   CV:  S1 S2, Regular rate and rhythm; no murmur, rub, or gallop  GI:  Soft, non-tender, non-distended; normal bowel sounds; no masses, no organomegaly   Rectal: Deferred  Musculoskeletal: No cyanosis, clubbing or edema  Normal ROM    Skin:  No jaundice, rashes, or lesions   Heme/Lymph: No palpable cervical lymphadenopathy  Psych: Normal affect, good eye contact  Neuro: No gross deficits, AAOx3    Lab Results:   Lab Results   Component Value Date    WBC 5 9 03/31/2021    HGB 11 3 03/31/2021    HCT 35 4 03/31/2021    MCV 82 03/31/2021     03/31/2021     Lab Results   Component Value Date    K 4 0 03/05/2021     03/05/2021    CO2 26 03/05/2021    BUN 4 (L) 03/05/2021    CREATININE 0 62 03/05/2021    CALCIUM 8 3 03/05/2021    CORRECTEDCA 9 6 03/05/2021    AST 21 03/05/2021    ALT 28 03/05/2021    ALKPHOS 119 (H) 03/05/2021    EGFR 130 03/05/2021     No results found for: IRON, TIBC, FERRITIN  Lab Results   Component Value Date    LIPASE 116 08/28/2020       Radiology Results:   No results found

## 2021-06-17 NOTE — TELEPHONE ENCOUNTER
Why does your doctor want you to have this procedure? Reflux    Do you have kidney disease?  no  If yes, are you on dialysis :     Have you had diverticulitis within the past 2 months? no    Are you diabetic?  no  If yes, insulin dependent: If yes, provide diabetic instructions sheet     Do take iron supplements?  no  If yes, instruct patient to hold iron supplement for 7 days prior    Are you on a blood thinner? no   Was the blood thinner sheet complete and faxed to cardiologist no  Plavix (clopidogrel), Coumadin (warfarin), Lovenox (enoxaparin), Xarelto (rivaroxaban), Pradaxa(dabigatran), Eliquis(apixaban) Savaysa/Lixiana (edoxapan)    Do you have an automatic implantable cardiac defibrillator (AICD)/pacemaker ([facility])? no  Was AICD/pacemaker sheet completed and faxed to cardiologist? no    Are you on home oxygen? no  If yes, continuous or nocturnal:     Have you been treated for MRSA, VRE or any communicable diseases? no    Heart attack, stroke, or stent within 3 months? no  Schedule at Hospital if within 3-6 months   Use nitroglycerin for chest pain in the last 6 months? no    History of organ  transplant?  no   If yes, notify Endo      History of neck/throat/tongue surgery or cancer? no  IF yes, notify Endo      Any problems with anesthesia in the past? Yes- sometimes slow to wake up     Was stool C diff ordered?  no Stool specimen needs to be completed prior to procedure    Do have any facial or body piercings? Ears     Do you have a latex allergy? no     Do have an allergy to metals? (Bravo study only) no     If pediatric patient, was consent faxed to pediatrician no     Patient rights reviewed yes     EGD prep completed by ; instructions reviewed and provided

## 2021-06-20 ENCOUNTER — ANESTHESIA EVENT (OUTPATIENT)
Dept: GASTROENTEROLOGY | Facility: AMBULATORY SURGERY CENTER | Age: 21
End: 2021-06-20

## 2021-06-21 ENCOUNTER — HOSPITAL ENCOUNTER (OUTPATIENT)
Dept: GASTROENTEROLOGY | Facility: AMBULATORY SURGERY CENTER | Age: 21
Discharge: HOME | End: 2021-06-21
Payer: COMMERCIAL

## 2021-06-21 ENCOUNTER — ANESTHESIA (OUTPATIENT)
Dept: GASTROENTEROLOGY | Facility: AMBULATORY SURGERY CENTER | Age: 21
End: 2021-06-21

## 2021-06-21 VITALS
DIASTOLIC BLOOD PRESSURE: 78 MMHG | OXYGEN SATURATION: 96 % | WEIGHT: 175 LBS | SYSTOLIC BLOOD PRESSURE: 114 MMHG | RESPIRATION RATE: 19 BRPM | BODY MASS INDEX: 29.12 KG/M2 | HEART RATE: 84 BPM | TEMPERATURE: 98 F

## 2021-06-21 DIAGNOSIS — K21.00 GASTROESOPHAGEAL REFLUX DISEASE WITH ESOPHAGITIS WITHOUT HEMORRHAGE: ICD-10-CM

## 2021-06-21 DIAGNOSIS — R13.19 ESOPHAGEAL DYSPHAGIA: ICD-10-CM

## 2021-06-21 LAB
EXT PREGNANCY TEST URINE: NEGATIVE
EXT. CONTROL: NORMAL

## 2021-06-21 PROCEDURE — 43450 DILATE ESOPHAGUS 1/MULT PASS: CPT | Performed by: INTERNAL MEDICINE

## 2021-06-21 PROCEDURE — 88305 TISSUE EXAM BY PATHOLOGIST: CPT | Performed by: PATHOLOGY

## 2021-06-21 PROCEDURE — 43239 EGD BIOPSY SINGLE/MULTIPLE: CPT | Performed by: INTERNAL MEDICINE

## 2021-06-21 RX ORDER — LIDOCAINE HYDROCHLORIDE 10 MG/ML
INJECTION, SOLUTION EPIDURAL; INFILTRATION; INTRACAUDAL; PERINEURAL AS NEEDED
Status: DISCONTINUED | OUTPATIENT
Start: 2021-06-21 | End: 2021-06-21

## 2021-06-21 RX ORDER — SODIUM CHLORIDE, SODIUM LACTATE, POTASSIUM CHLORIDE, CALCIUM CHLORIDE 600; 310; 30; 20 MG/100ML; MG/100ML; MG/100ML; MG/100ML
50 INJECTION, SOLUTION INTRAVENOUS CONTINUOUS
Status: DISCONTINUED | OUTPATIENT
Start: 2021-06-21 | End: 2021-06-25 | Stop reason: HOSPADM

## 2021-06-21 RX ORDER — PROPOFOL 10 MG/ML
INJECTION, EMULSION INTRAVENOUS AS NEEDED
Status: DISCONTINUED | OUTPATIENT
Start: 2021-06-21 | End: 2021-06-21

## 2021-06-21 RX ORDER — SODIUM CHLORIDE, SODIUM LACTATE, POTASSIUM CHLORIDE, CALCIUM CHLORIDE 600; 310; 30; 20 MG/100ML; MG/100ML; MG/100ML; MG/100ML
INJECTION, SOLUTION INTRAVENOUS CONTINUOUS PRN
Status: DISCONTINUED | OUTPATIENT
Start: 2021-06-21 | End: 2021-06-21

## 2021-06-21 RX ADMIN — SODIUM CHLORIDE, SODIUM LACTATE, POTASSIUM CHLORIDE, CALCIUM CHLORIDE: 600; 310; 30; 20 INJECTION, SOLUTION INTRAVENOUS at 08:47

## 2021-06-21 RX ADMIN — PROPOFOL 200 MG: 10 INJECTION, EMULSION INTRAVENOUS at 08:47

## 2021-06-21 RX ADMIN — LIDOCAINE HYDROCHLORIDE 50 MG: 10 INJECTION, SOLUTION EPIDURAL; INFILTRATION; INTRACAUDAL; PERINEURAL at 08:47

## 2021-06-21 RX ADMIN — SODIUM CHLORIDE, SODIUM LACTATE, POTASSIUM CHLORIDE, CALCIUM CHLORIDE 50 ML/HR: 600; 310; 30; 20 INJECTION, SOLUTION INTRAVENOUS at 08:29

## 2021-06-21 RX ADMIN — PROPOFOL 50 MG: 10 INJECTION, EMULSION INTRAVENOUS at 08:50

## 2021-06-21 NOTE — INTERVAL H&P NOTE
H&P reviewed  After examining the patient I find no changes in the patients condition since the H&P had been written      Vitals:    06/21/21 0823   BP: 125/72   Pulse: 87   Resp: 18   Temp:    SpO2: 98%

## 2021-06-21 NOTE — ANESTHESIA PREPROCEDURE EVALUATION
Procedure:  EGD    Relevant Problems   GI/HEPATIC   (+) Esophageal dysphagia   (+) GERD (gastroesophageal reflux disease)   (+) Gastroesophageal reflux disease with esophagitis without hemorrhage      NEURO/PSYCH   (+) Major depressive disorder, recurrent severe without psychotic features (HCC)   (+) Post traumatic stress disorder (PTSD)      PULMONARY   (+) Asthma, allergic, mild intermittent, uncomplicated   USED INHALER LAST NIGHT   BREAST FEEDING X 3 MONTHS  INSTRUCTIONS GIVEN FOR DISCARDING MILK   CAN RESTART BREAST FEEDING TONIGHT  Physical Exam    Airway    Mallampati score: I  TM Distance: >3 FB  Neck ROM: full     Dental   No notable dental hx     Cardiovascular      Pulmonary  Breath sounds clear to auscultation,     Other Findings        Anesthesia Plan  ASA Score- 2     Anesthesia Type- IV sedation with anesthesia with ASA Monitors  Additional Monitors:   Airway Plan:           Plan Factors-Exercise tolerance (METS): >4 METS  Chart reviewed  Patient is not a current smoker  Patient not instructed to abstain from smoking on day of procedure  Patient did not smoke on day of surgery  Induction- intravenous  Postoperative Plan-     Informed Consent- Anesthetic plan and risks discussed with patient  I personally reviewed this patient with the CRNA  Discussed and agreed on the Anesthesia Plan with the JESUS Berrios

## 2021-06-21 NOTE — DISCHARGE INSTRUCTIONS
Upper Endoscopy   WHAT YOU NEED TO KNOW:   An upper endoscopy is also called an upper gastrointestinal (GI) endoscopy, or an esophagogastroduodenoscopy (EGD)  It is a procedure to examine the inside of your esophagus, stomach, and duodenum (first part of the small intestine) with a scope  You may feel bloated, gassy, or have some abdominal discomfort after your procedure  Your throat may be sore for 24 to 36 hours  You may burp or pass gas from air that is still inside your body  DISCHARGE INSTRUCTIONS:   Seek care immediately if:    You have sudden, severe abdominal pain   You have problems swallowing   You have a large amount of black, sticky bowel movements or blood in your bowel movements   You have sudden trouble breathing   You feel weak, lightheaded, or faint or your heart beats faster than normal for you  Contact your healthcare provider if:    You have a fever and chills   You have nausea or are vomiting   Your abdomen is bloated or feels full and hard   You have abdominal pain   You have black, sticky bowel movements or blood in your bowel movements   You have not had a bowel movement for 3 days after your procedure   You have rash or hives   You have questions or concerns about your procedure  Activity:    Do not lift, strain, or run for 24 hours after your procedure   Rest after your procedure  You have been given medicine to relax you  Do not drive or make important decisions until the day after your procedure  Return to your normal activity as directed   Relieve gas and discomfort from bloating by lying on your right side with a heating pad on your abdomen  You may need to take short walks to help the gas move out  Eat small meals until bloating is relieved  Follow up with your healthcare provider as directed: Write down your questions so you remember to ask them during your visits       If you take a blood thinner, please review the specific instructions from your endoscopist about when you should resume it  These can be found in the Recommendation and Your Medication list sections of this After Visit Summary  Esophageal Dilation   WHAT YOU NEED TO KNOW:   What do I need to know about esophageal dilation? Esophageal dilation is a procedure to widen a narrow part of your esophagus  Your healthcare provider will use a dilator (inflatable balloon or another tool that expands) to make the area wider  He or she may also do an endoscopy before or during your esophageal dilation  During an endoscopy, your healthcare provider will use a scope to see inside your esophagus  How do I prepare for esophageal dilation? · Your healthcare provider will tell you how to prepare for this procedure  Tell your provider about all medicines you currently take  He or she will tell you if you need to stop taking any medicine before the procedure, and when to stop  He or she will tell you which medicines to take or not take on the day of the procedure  · Arrange to have someone drive you home after the procedure  If you are having general anesthesia, the person needs to stay with you for 24 hours  · You may need blood or urine tests before your procedure  You may also need to have a barium swallow, x-ray, CT scan, or MRI of your esophagus  · Tell your provider about any allergies you have  Tell him or her if you had an allergic reaction to anesthesia  What will happen during esophageal dilation? · General anesthesia may be given to keep you asleep and free from pain during your procedure  You may instead be given local anesthesia  This is sprayed into your mouth to numb the area and dull the pain  You may still feel pressure or pushing during the procedure  · Your healthcare provider will put a scope or dilator into your mouth and guide it down to your esophagus  A sample of tissue may be taken to be tested      · Your [facility] provider will use a dilator to stretch the narrow part of your esophagus  He or she may repeat this step 1 or 2 times with larger dilators  He or she may place a stent or inject steroid medicine into the area to help prevent it from narrowing again  What should I expect after esophageal dilation? You will be taken to a room to rest until you are fully awake  You may have a sore throat for a few hours after the procedure  Your healthcare provider will tell you if you need to have dilation again  He or she will tell you how to get the results of any tests done during the procedure  What are the risks of esophageal dilation? During the procedure, saliva or stomach fluid may get into your lungs and cause pneumonia  Your esophagus may be damaged and cause bleeding or an infection  You may need another surgery to repair the damage  Even with treatment, your esophagus may become narrow again  CARE AGREEMENT:   You have the right to help plan your care  Learn about your health condition and how it may be treated  Discuss treatment options with your healthcare providers to decide what care you want to receive  You always have the right to refuse treatment  The above information is an  only  It is not intended as medical advice for individual conditions or treatments  Talk to your doctor, nurse or pharmacist before following any medical regimen to see if it is safe and effective for you  © Copyright [address] Information is for End User's use only and may not be sold, redistributed or otherwise used for commercial purposes   All illustrations and images included in CareNotes® are the copyrighted property of [employer]  or [address] [place] [address]

## 2021-06-21 NOTE — ANESTHESIA POSTPROCEDURE EVALUATION
Post-Op Assessment Note    CV Status:  Stable  Pain Score: 0    Pain management: adequate     Mental Status:  Awake and sleepy   Hydration Status:  Euvolemic   PONV Controlled:  Controlled   Airway Patency:  Patent      Post Op Vitals Reviewed: Yes      Staff: CRNA         No complications documented      BP   113/64   Temp      Pulse  68   Resp   16   SpO2 96% ra

## 2021-06-21 NOTE — SEDATION DOCUMENTATION
Dr Alisha Koehler dilated the patients esophagus with [facility] # [id number]  See physicians record

## 2021-06-29 NOTE — RESULT ENCOUNTER NOTE
Discussed with patient no EGD recall  Biopsies essentially negative  Still having some difficulty swallowing advised to make or keep office visit

## 2023-06-30 NOTE — TELEPHONE ENCOUNTER
----- Message from Manuelito Hdz MD sent at 12/31/2018  7:32 AM EST -----  GC/chlamydia negative, please inform the patient  Hpi Title: Evaluation of Skin Lesions